# Patient Record
Sex: FEMALE | HISPANIC OR LATINO | Employment: FULL TIME | URBAN - METROPOLITAN AREA
[De-identification: names, ages, dates, MRNs, and addresses within clinical notes are randomized per-mention and may not be internally consistent; named-entity substitution may affect disease eponyms.]

---

## 2017-01-17 ENCOUNTER — OFFICE VISIT (OUTPATIENT)
Dept: URGENT CARE | Facility: CLINIC | Age: 22
End: 2017-01-17
Payer: COMMERCIAL

## 2017-01-17 VITALS
SYSTOLIC BLOOD PRESSURE: 104 MMHG | OXYGEN SATURATION: 98 % | BODY MASS INDEX: 23.96 KG/M2 | HEART RATE: 72 BPM | WEIGHT: 144 LBS | DIASTOLIC BLOOD PRESSURE: 68 MMHG | TEMPERATURE: 97.9 F

## 2017-01-17 DIAGNOSIS — F41.9 ANXIETY: ICD-10-CM

## 2017-01-17 PROCEDURE — 99214 OFFICE O/P EST MOD 30 MIN: CPT | Performed by: NURSE PRACTITIONER

## 2017-01-17 RX ORDER — ZOLPIDEM TARTRATE 5 MG/1
5 TABLET ORAL NIGHTLY PRN
COMMUNITY
End: 2019-03-12

## 2017-01-17 ASSESSMENT — ENCOUNTER SYMPTOMS
NERVOUS/ANXIOUS: 1
VOMITING: 0
MYALGIAS: 0
DECREASED CONCENTRATION: 1
NAUSEA: 0
FEVER: 0
INSOMNIA: 1
DEPRESSED MOOD: 1
STRESS: 1
CONFUSION: 0
HEADACHES: 0
HALLUCINATIONS: 0
CHILLS: 0
SHORTNESS OF BREATH: 0
COUGH: 0
ABDOMINAL PAIN: 0

## 2017-01-17 ASSESSMENT — LIFESTYLE VARIABLES: SUBSTANCE_ABUSE: 0

## 2017-01-17 NOTE — Clinical Note
January 17, 2017         Patient: Hannah Wiggins   YOB: 1995   Date of Visit: 1/17/2017           To Whom it May Concern:    Hannah Wiggins was seen in my clinic on 1/17/2017. She should be off work today due to illness.     If you have any questions or concerns, please don't hesitate to call.        Sincerely,           ROBERT PaceP.PASTOR.  Electronically Signed

## 2017-01-17 NOTE — PATIENT INSTRUCTIONS
Generalized Anxiety Disorder  Generalized anxiety disorder (DREA) is a mental disorder. It interferes with life functions, including relationships, work, and school.  DREA is different from normal anxiety, which everyone experiences at some point in their lives in response to specific life events and activities. Normal anxiety actually helps us prepare for and get through these life events and activities. Normal anxiety goes away after the event or activity is over.   DREA causes anxiety that is not necessarily related to specific events or activities. It also causes excess anxiety in proportion to specific events or activities. The anxiety associated with DREA is also difficult to control. DREA can vary from mild to severe. People with severe DREA can have intense waves of anxiety with physical symptoms (panic attacks).   SYMPTOMS  The anxiety and worry associated with DREA are difficult to control. This anxiety and worry are related to many life events and activities and also occur more days than not for 6 months or longer. People with DREA also have three or more of the following symptoms (one or more in children):  · Restlessness.    · Fatigue.  · Difficulty concentrating.    · Irritability.  · Muscle tension.  · Difficulty sleeping or unsatisfying sleep.  DIAGNOSIS  DREA is diagnosed through an assessment by your health care provider. Your health care provider will ask you questions about your mood, physical symptoms, and events in your life. Your health care provider may ask you about your medical history and use of alcohol or drugs, including prescription medicines. Your health care provider may also do a physical exam and blood tests. Certain medical conditions and the use of certain substances can cause symptoms similar to those associated with DREA. Your health care provider may refer you to a mental health specialist for further evaluation.  TREATMENT  The following therapies are usually used to treat DREA:    · Medication. Antidepressant medication usually is prescribed for long-term daily control. Antianxiety medicines may be added in severe cases, especially when panic attacks occur.    · Talk therapy (psychotherapy). Certain types of talk therapy can be helpful in treating DREA by providing support, education, and guidance. A form of talk therapy called cognitive behavioral therapy can teach you healthy ways to think about and react to daily life events and activities.  · Stress management techniques. These include yoga, meditation, and exercise and can be very helpful when they are practiced regularly.  A mental health specialist can help determine which treatment is best for you. Some people see improvement with one therapy. However, other people require a combination of therapies.     This information is not intended to replace advice given to you by your health care provider. Make sure you discuss any questions you have with your health care provider.     Document Released: 04/14/2014 Document Revised: 01/08/2016 Document Reviewed: 04/14/2014  [employer] Interactive Patient Education ©2016 [employer]

## 2017-01-17 NOTE — MR AVS SNAPSHOT
Hannah Wiggins   2017 10:45 AM   Office Visit   MRN: [id number]    Department:  [facility]   Dept Phone:  [phone]    Description:  Female : 1995   Provider:  ANI Pace           Reason for Visit     Stress Anxiety, Not sleeping quite some time/Had to leave work      Allergies as of 2017     No Known Allergies      You were diagnosed with     Anxiety   [587862]         Vital Signs     Blood Pressure Pulse Temperature Weight Oxygen Saturation Last Menstrual Period    104/68 mmHg 72 36.6 °C (97.9 °F) 65.318 kg (144 lb) 98% 2017    Breastfeeding? Smoking Status                No Never Smoker           Basic Information     Date Of Birth Sex Race Ethnicity Preferred Language    1995 Female  or   Origin (Yakut,Nigerien,Barbadian,Cayman Islander, etc) English      Health Maintenance        Date Due Completion Dates    IMM HEP B VACCINE (1 of 3 - Primary Series) 1995 ---    IMM HEP A VACCINE (1 of 2 - Standard Series) 1996 ---    IMM HPV VACCINE (1 of 3 - Female 3 Dose Series) 2006 ---    IMM VARICELLA (CHICKENPOX) VACCINE (1 of 2 - 2 Dose Adolescent Series) 2008 ---    IMM MENINGOCOCCAL VACCINE (MCV4) (1 of 1) 2011 ---    IMM DTaP/Tdap/Td Vaccine (1 - Tdap) 2014 ---    PAP SMEAR 2016 ---    IMM INFLUENZA (1) 2016 ---            Current Immunizations     No immunizations on file.      Below and/or attached are the medications your provider expects you to take. Review all of your home medications and newly ordered medications with your provider and/or pharmacist. Follow medication instructions as directed by your provider and/or pharmacist. Please keep your medication list with you and share with your provider. Update the information when medications are discontinued, doses are changed, or new medications (including over-the-counter products) are added; and carry medication information at all times in the  event of emergency situations     Allergies:  No Known Allergies          Medications  Valid as of: January 17, 2017 - 11:43 AM    Generic Name Brand Name Tablet Size Instructions for use    Hydrocod Polst-Chlorphen Polst (Suspension Extended Release) TUSSIONEX 10-8 MG/5ML Take 5 mL by mouth every 12 hours as needed for Cough.        Ibuprofen (Tab) MOTRIN 800 MG Take 1 Tab by mouth every 8 hours as needed.        Zolpidem Tartrate (Tab) AMBIEN 5 MG Take 5 mg by mouth at bedtime as needed for Sleep.        .                 Medicines prescribed today were sent to:     [facility]/PHARMACY #[id number] - [place], [place] - [address] AT [address] [place] [address]    Phone: [phone] Fax: [phone]    Open 24 Hours?: No      Medication refill instructions:       If your prescription bottle indicates you have medication refills left, it is not necessary to call your provider’s office. Please contact your pharmacy and they will refill your medication.    If your prescription bottle indicates you do not have any refills left, you may request refills at any time through one of the following ways: The online Ipselex system (except Urgent Care), by calling your provider’s office, or by asking your pharmacy to contact your provider’s office with a refill request. Medication refills are processed only during regular business hours and may not be available until the next business day. Your provider may request additional information or to have a follow-up visit with you prior to refilling your medication.   *Please Note: Medication refills are assigned a new Rx number when refilled electronically. Your pharmacy may indicate that no refills were authorized even though a new prescription for the same medication is available at the pharmacy. Please request the medicine by name with the pharmacy before contacting your provider for a refill.        Referral     A referral request has been sent to our  patient care coordination department. Please allow 3-5 business days for us to process this request and contact you either by phone or mail. If you do not hear from us by the 5th business day, please call us at [phone].        Instructions    Generalized Anxiety Disorder  Generalized anxiety disorder (DREA) is a mental disorder. It interferes with life functions, including relationships, work, and school.  DREA is different from normal anxiety, which everyone experiences at some point in their lives in response to specific life events and activities. Normal anxiety actually helps us prepare for and get through these life events and activities. Normal anxiety goes away after the event or activity is over.   DREA causes anxiety that is not necessarily related to specific events or activities. It also causes excess anxiety in proportion to specific events or activities. The anxiety associated with DREA is also difficult to control. DREA can vary from mild to severe. People with severe DREA can have intense waves of anxiety with physical symptoms (panic attacks).   SYMPTOMS  The anxiety and worry associated with DREA are difficult to control. This anxiety and worry are related to many life events and activities and also occur more days than not for 6 months or longer. People with DREA also have three or more of the following symptoms (one or more in children):  · Restlessness.    · Fatigue.  · Difficulty concentrating.    · Irritability.  · Muscle tension.  · Difficulty sleeping or unsatisfying sleep.  DIAGNOSIS  DREA is diagnosed through an assessment by your health care provider. Your health care provider will ask you questions about your mood, physical symptoms, and events in your life. Your health care provider may ask you about your medical history and use of alcohol or drugs, including prescription medicines. Your health care provider may also do a physical exam and blood tests. Certain medical conditions and the  use of certain substances can cause symptoms similar to those associated with DREA. Your health care provider may refer you to a mental health specialist for further evaluation.  TREATMENT  The following therapies are usually used to treat DREA:   · Medication. Antidepressant medication usually is prescribed for long-term daily control. Antianxiety medicines may be added in severe cases, especially when panic attacks occur.    · Talk therapy (psychotherapy). Certain types of talk therapy can be helpful in treating DREA by providing support, education, and guidance. A form of talk therapy called cognitive behavioral therapy can teach you healthy ways to think about and react to daily life events and activities.  · Stress management techniques. These include yoga, meditation, and exercise and can be very helpful when they are practiced regularly.  A mental health specialist can help determine which treatment is best for you. Some people see improvement with one therapy. However, other people require a combination of therapies.     This information is not intended to replace advice given to you by your health care provider. Make sure you discuss any questions you have with your health care provider.     Document Released: 04/14/2014 Document Revised: 01/08/2016 Document Reviewed: 04/14/2014  [employer] Interactive Patient Education ©2016 Elsevier Inc.            Intraxio Access Code: [email or web address]  Expires: 1/29/2017  5:54 PM    Intraxio  A secure, online tool to manage your health information     [employer] Intraxio® is a secure, online tool that connects you to your personalized health information from the privacy of your home -- day or night - making it very easy for you to manage your healthcare. Once the activation process is completed, you can even access your medical information using the Intraxio asndra, which is available for free in the Apple Sandra store or Google Play store.     Intraxio provides the following  levels of access (as shown below):   My Chart Features   [facility] Primary Care Doctor [facility]  Specialists [facility]  Urgent  Care Non-[facility]  Primary Care  Doctor   Email your healthcare team securely and privately 24/7 X X X    Manage appointments: schedule your next appointment; view details of past/upcoming appointments X      Request prescription refills. X      View recent personal medical records, including lab and immunizations X X X X   View health record, including health history, allergies, medications X X X X   Read reports about your outpatient visits, procedures, consult and ER notes X X X X   See your discharge summary, which is a recap of your hospital and/or ER visit that includes your diagnosis, lab results, and care plan. X X       How to register for [employer]:  1. Go to  [email or web address].  2. Click on the Sign Up Now box, which takes you to the New Member Sign Up page. You will need to provide the following information:  a. Enter your [employer] Access Code exactly as it appears at the top of this page. (You will not need to use this code after you’ve completed the sign-up process. If you do not sign up before the expiration date, you must request a new code.)   b. Enter your date of birth.   c. Enter your home email address.   d. Click Submit, and follow the next screen’s instructions.  3. Create a [employer] ID. This will be your [employer] login ID and cannot be changed, so think of one that is secure and easy to remember.  4. Create a [employer] password. You can change your password at any time.  5. Enter your Password Reset Question and Answer. This can be used at a later time if you forget your password.   6. Enter your e-mail address. This allows you to receive e-mail notifications when new information is available in [employer].  7. Click Sign Up. You can now view your health information.    For assistance activating your [employer] account, call [phone]

## 2017-02-24 ENCOUNTER — OFFICE VISIT (OUTPATIENT)
Dept: URGENT CARE | Facility: CLINIC | Age: 22
End: 2017-02-24
Payer: COMMERCIAL

## 2017-02-24 VITALS
TEMPERATURE: 97.9 F | HEART RATE: 80 BPM | SYSTOLIC BLOOD PRESSURE: 122 MMHG | BODY MASS INDEX: 23.63 KG/M2 | DIASTOLIC BLOOD PRESSURE: 86 MMHG | OXYGEN SATURATION: 98 % | RESPIRATION RATE: 16 BRPM | WEIGHT: 142 LBS

## 2017-02-24 DIAGNOSIS — F41.9 ANXIETY: ICD-10-CM

## 2017-02-24 PROCEDURE — 99214 OFFICE O/P EST MOD 30 MIN: CPT | Performed by: NURSE PRACTITIONER

## 2017-02-24 RX ORDER — ALPRAZOLAM 0.25 MG/1
0.25 TABLET ORAL 2 TIMES DAILY PRN
Qty: 15 TAB | Refills: 0 | Status: SHIPPED | OUTPATIENT
Start: 2017-02-24 | End: 2019-03-12

## 2017-02-24 ASSESSMENT — ENCOUNTER SYMPTOMS
NERVOUS/ANXIOUS: 1
FEVER: 0
DEPRESSION: 1

## 2017-02-24 NOTE — PROGRESS NOTES
Subjective:      Hannah Wiggins is a 22 y.o. female who presents with Anxiety    Past Medical History   Diagnosis Date   • Anxiety      Social History     Social History   • Marital Status: Single     Spouse Name: N/A   • Number of Children: N/A   • Years of Education: N/A     Occupational History   • Not on file.     Social History Main Topics   • Smoking status: Never Smoker    • Smokeless tobacco: Never Used   • Alcohol Use: No   • Drug Use: No   • Sexual Activity: Not on file     Other Topics Concern   • Not on file     Social History Narrative     Family hx: not pertinent to today's visit    This patient presents today for complaint of anxiety. Her last visit in urgent care was on 1/17/17 also for the same complaint. She was referred to behavioral health at that time, upon reviewing the notes, it does appear that her referral was approved. There is a note from the referrals coordinator that she tried to reach the patient and was unable to make contact. She did leave a message however for the patient with the information regarding the referral. Patient does not recall getting the message and has not yet called for the appointment. I reviewed the referral information and gave the patient a written copy of the referral approval and the number to call to schedule her appointmetn. Patient denies suicidal or homicidal ideation    Secondly, patient complains that she has been coughing for the last 2 weeks. She states cough has been dry. She denies any chest congestion or shortness of breath. She denies fever, aches, or chills.          Anxiety  Presents for follow-up visit. Progression since onset: unresolved. Symptoms include nervous/anxious behavior.           Review of Systems   Constitutional: Negative for fever and malaise/fatigue.   Psychiatric/Behavioral: Positive for depression. The patient is nervous/anxious.      All other systems reviewed and are negative      Objective:     Wt 64.411 kg (142 lb)      Physical Exam   Constitutional: She is oriented to person, place, and time. She appears well-developed and well-nourished. No distress.   HENT:   Head: Normocephalic.   Right Ear: External ear normal.   Left Ear: External ear normal.   Nose: Nose normal.   Mouth/Throat: Oropharynx is clear and moist. No oropharyngeal exudate.   Eyes: Conjunctivae and EOM are normal. Pupils are equal, round, and reactive to light. Right eye exhibits no discharge. Left eye exhibits no discharge.   Neck: Normal range of motion. Neck supple.   Cardiovascular: Normal rate and regular rhythm.    Pulmonary/Chest: Effort normal and breath sounds normal.   Musculoskeletal: Normal range of motion.   Neurological: She is alert and oriented to person, place, and time.   Skin: Skin is warm and dry. She is not diaphoretic.   Psychiatric: She has a normal mood and affect. Her speech is normal and behavior is normal. Judgment and thought content normal. Her mood appears not anxious. Her affect is not angry, not blunt, not labile and not inappropriate. She is not agitated, not aggressive and not hyperactive. Thought content is not paranoid and not delusional. Cognition and memory are normal. She does not exhibit a depressed mood. She expresses no homicidal and no suicidal ideation. She expresses no suicidal plans and no homicidal plans.   Vitals reviewed.              Assessment/Plan:   Anxiety  Viral URI vs Allergies  -flonase OTC  -OTC decongestant/antihistamine of choice  -Written information for referral given  -xanax PRN for anxiety; clearly stated no driving or ETOH with this medication   -ER precautions for worsening of anxiety or depression  There are no diagnoses linked to this encounter.

## 2017-02-24 NOTE — MR AVS SNAPSHOT
Hannah Wiggins   2017 1:30 PM   Office Visit   MRN: [id number]    Department:  [facility]   Dept Phone:  [phone]    Description:  Female : 1995   Provider:  Cathey J Hamman, A.P.N.           Reason for Visit     Anxiety           Allergies as of 2017     No Known Allergies      You were diagnosed with     Anxiety   [[id number]]         Vital Signs     Blood Pressure Pulse Temperature Respirations Weight Oxygen Saturation    122/86 mmHg 80 36.6 °C (97.9 °F) 16 64.411 kg (142 lb) 98%    Smoking Status                   Never Smoker            Basic Information     Date Of Birth Sex Race Ethnicity Preferred Language    1995 Female  or   Origin (Korean,Sri Lankan,Cook Islander,Paulino, etc) English      Health Maintenance        Date Due Completion Dates    IMM HEP B VACCINE (1 of 3 - Primary Series) 1995 ---    IMM HEP A VACCINE (1 of 2 - Standard Series) 1996 ---    IMM HPV VACCINE (1 of 3 - Female 3 Dose Series) 2006 ---    IMM VARICELLA (CHICKENPOX) VACCINE (1 of 2 - 2 Dose Adolescent Series) 2008 ---    IMM DTaP/Tdap/Td Vaccine (1 - Tdap) 2014 ---    PAP SMEAR 2016 ---    IMM INFLUENZA (1) 2016 ---            Current Immunizations     No immunizations on file.      Below and/or attached are the medications your provider expects you to take. Review all of your home medications and newly ordered medications with your provider and/or pharmacist. Follow medication instructions as directed by your provider and/or pharmacist. Please keep your medication list with you and share with your provider. Update the information when medications are discontinued, doses are changed, or new medications (including over-the-counter products) are added; and carry medication information at all times in the event of emergency situations     Allergies:  No Known Allergies          Medications  Valid as of: 2017 -  2:06 PM    Generic  Name Brand Name Tablet Size Instructions for use    ALPRAZolam (Tab) XANAX 0.25 MG Take 1 Tab by mouth 2 times a day as needed for Sleep or Anxiety.        Hydrocod Polst-Chlorphen Polst (Suspension Extended Release) TUSSIONEX 10-8 MG/5ML Take 5 mL by mouth every 12 hours as needed for Cough.        Ibuprofen (Tab) MOTRIN 800 MG Take 1 Tab by mouth every 8 hours as needed.        Zolpidem Tartrate (Tab) AMBIEN 5 MG Take 5 mg by mouth at bedtime as needed for Sleep.        .                 Medicines prescribed today were sent to:     [facility]/PHARMACY #[id number] - [place], [place] - [address] AT [address] [place] [address]    Phone: [phone] Fax: [phone]    Open 24 Hours?: No      Medication refill instructions:       If your prescription bottle indicates you have medication refills left, it is not necessary to call your provider’s office. Please contact your pharmacy and they will refill your medication.    If your prescription bottle indicates you do not have any refills left, you may request refills at any time through one of the following ways: The online NanoVasc system (except Urgent Care), by calling your provider’s office, or by asking your pharmacy to contact your provider’s office with a refill request. Medication refills are processed only during regular business hours and may not be available until the next business day. Your provider may request additional information or to have a follow-up visit with you prior to refilling your medication.   *Please Note: Medication refills are assigned a new Rx number when refilled electronically. Your pharmacy may indicate that no refills were authorized even though a new prescription for the same medication is available at the pharmacy. Please request the medicine by name with the pharmacy before contacting your provider for a refill.           NanoVasc Access Code: [email or web address]  Expires: 3/26/2017  2:06 PM    Roselyn FIGUEROA  secure, online tool to manage your health information     [employer]’s [employer]IS [employer]® is a secure, online tool that connects you to your personalized health information from the privacy of your home -- day or night - making it very easy for you to manage your healthcare. Once the activation process is completed, you can even access your medical information using the TeleCIS Wireless sandra, which is available for free in the Apple Sandra store or Google Play store.     [employer]CIS Wireless provides the following levels of access (as shown below):   My Chart Features   [facility] Doctor [facility]  Specialists [facility]  [facility]gent  [facility]  Primary Care  Doctor   Email your healthcare team securely and privately 24/7 X X X    Manage appointments: schedule your next appointment; view details of past/upcoming appointments X      Request prescription refills. X      View recent personal medical records, including lab and immunizations X X X X   View health record, including health history, allergies, medications X X X X   Read reports about your outpatient visits, procedures, consult and ER notes X X X X   See your discharge summary, which is a recap of your hospital and/or ER visit that includes your diagnosis, lab results, and care plan. X X       How to register for [employer]IS [employer]:  1. Go to  [email or web address].  2. Click on the Sign Up Now box, which takes you to the New Member Sign Up page. You will need to provide the following information:  a. Enter your [employer] Access Code exactly as it appears at the top of this page. (You will not need to use this code after you’ve completed the sign-up process. If you do not sign up before the expiration date, you must request a new code.)   b. Enter your date of birth.   c. Enter your home email address.   d. Click Submit, and follow the next screen’s instructions.  3. Create a Tele[employer]IS [employer] ID. This will be your [employer] login ID and cannot be changed, so think of one that is secure and easy to  remember.  4. Create a Terascore password. You can change your password at any time.  5. Enter your Password Reset Question and Answer. This can be used at a later time if you forget your password.   6. Enter your e-mail address. This allows you to receive e-mail notifications when new information is available in Ter[employer]ore.  7. Click Sign Up. You can now view your health information.    For assistance activating your Terascore account, call [phone]

## 2017-05-03 ENCOUNTER — OFFICE VISIT (OUTPATIENT)
Dept: URGENT CARE | Facility: PHYSICIAN GROUP | Age: 22
End: 2017-05-03
Payer: COMMERCIAL

## 2017-05-03 ENCOUNTER — HOSPITAL ENCOUNTER (OUTPATIENT)
Facility: MEDICAL CENTER | Age: 22
End: 2017-05-03
Attending: PHYSICIAN ASSISTANT
Payer: COMMERCIAL

## 2017-05-03 VITALS
SYSTOLIC BLOOD PRESSURE: 122 MMHG | HEIGHT: 65 IN | HEART RATE: 97 BPM | OXYGEN SATURATION: 99 % | BODY MASS INDEX: 23.66 KG/M2 | WEIGHT: 142 LBS | RESPIRATION RATE: 16 BRPM | TEMPERATURE: 98.7 F | DIASTOLIC BLOOD PRESSURE: 72 MMHG

## 2017-05-03 DIAGNOSIS — R09.81 NASAL SINUS CONGESTION: ICD-10-CM

## 2017-05-03 DIAGNOSIS — Z3A.01 LESS THAN 8 WEEKS GESTATION OF PREGNANCY: ICD-10-CM

## 2017-05-03 LAB
APPEARANCE UR: CLEAR
B-HCG SERPL-ACNC: 575.5 MIU/ML (ref 0–5)
BILIRUB UR STRIP-MCNC: NEGATIVE MG/DL
COLOR UR AUTO: YELLOW
GLUCOSE UR STRIP.AUTO-MCNC: NEGATIVE MG/DL
INT CON NEG: NEGATIVE
INT CON POS: POSITIVE
KETONES UR STRIP.AUTO-MCNC: ABNORMAL MG/DL
LEUKOCYTE ESTERASE UR QL STRIP.AUTO: NEGATIVE
NITRITE UR QL STRIP.AUTO: NEGATIVE
PH UR STRIP.AUTO: 6 [PH] (ref 5–8)
POC URINE PREGNANCY TEST: POSITIVE
PROT UR QL STRIP: ABNORMAL MG/DL
RBC UR QL AUTO: NEGATIVE
SP GR UR STRIP.AUTO: 1.02
UROBILINOGEN UR STRIP-MCNC: NEGATIVE MG/DL

## 2017-05-03 PROCEDURE — 81025 URINE PREGNANCY TEST: CPT | Performed by: PHYSICIAN ASSISTANT

## 2017-05-03 PROCEDURE — 99214 OFFICE O/P EST MOD 30 MIN: CPT | Performed by: PHYSICIAN ASSISTANT

## 2017-05-03 PROCEDURE — 81002 URINALYSIS NONAUTO W/O SCOPE: CPT | Performed by: PHYSICIAN ASSISTANT

## 2017-05-03 PROCEDURE — 84702 CHORIONIC GONADOTROPIN TEST: CPT

## 2017-05-03 ASSESSMENT — ENCOUNTER SYMPTOMS
VOMITING: 0
PALPITATIONS: 0
FEVER: 0
ABDOMINAL PAIN: 0
COUGH: 1
HEADACHES: 0
CHILLS: 0
DIZZINESS: 1
MYALGIAS: 0
NAUSEA: 1

## 2017-05-03 NOTE — PATIENT INSTRUCTIONS
Medicines During Pregnancy  During pregnancy, there are medicines that are either safe or unsafe to take. Medicines include prescriptions from your caregiver, over-the-counter medicines, topical creams applied to the skin, and all herbal substances. Medicines are put into either Class A, B, C, or D. Class A and B medicines have been shown to be safe in pregnancy. Class C medicines are also considered to be safe in pregnancy, but these medicines should only be used when necessary. Class D medicines should not be used at all in pregnancy. They can be harmful to a baby.   It is best to take as little medicine as possible while pregnant. However, some medicines are necessary to take for the mother and baby's health. Sometimes, it is more dangerous to stop taking certain medicines than to stay on them. This is often the case for people with long-term (chronic) conditions such as asthma, diabetes, or high blood pressure (hypertension). If you are pregnant and have a chronic illness, call your caregiver right away. Bring a list of your medicines and their doses to your appointments. If you are planning to become pregnant, schedule a doctor's appointment and discuss your medicines with your caregiver.  Lastly, write down the phone number to your pharmacist. They can answer questions regarding a medicine's class and safety. They cannot give advice as to whether you should or should not be on a medicine.   SAFE AND UNSAFE MEDICINES  There is a long list of medicines that are considered safe in pregnancy. Below is a shorter list. For specific medicines, ask your caregiver.   Allergy Medicines  Loratadine, cetirizine, and chlorpheniramine are safe to take. Certain nasal steroid sprays are safe. Talk to your caregiver about specific brands that are safe.  (Claritin or Zytec)  Analgesics  Acetaminophen and acetaminophen with codeine are safe to take. All other nonsteroidal anti-inflammatory drugs (NSAIDS) are not safe. This  includes ibuprofen.   Antacids   Many over-the-counter antacids are safe to take. Talk to your caregiver about specific brands that are safe. Famotidine, ranitidine, and lansoprazole are safe. Omepresole is considered safe to take in the second trimester.  Antibiotic Medicines   There are several antibiotics to avoid. These include, but are not limited to, tetracyline, quinolones, and sulfa medications. Talk to your caregiver before taking any antibiotic.   Antihistamines   Talk to your caregiver about specific brands that are safe.   Asthma Medicines   Most asthma steroid inhalers are safe to take. Talk to your caregiver for specific details.  Calcium   Calcium supplements are safe to take. Do not take oyster shell calcium.   Cough and Cold Medicines   It is safe to take products with guaifenesin or dextromethorphan. Talk to your caregiver about specific brands that are safe. It is not safe to take products that contain aspirin or ibuprofen.  Decongestant Medicines  Pseudoephedrine-containing products are safe to take in the second and third trimester.   Depression Medicines   Talk about these medicines with your caregiver.   Antidiarrheal Medicines   It is safe to take loperamide. Talk to your caregiver about specific brands that are safe. It is not safe to take any antidiarrheal medicine that contains bismuth.  Eyedrops   Allergy eyedrops should be limited.   Iron   It is safe to use certain iron-containing medicines for anemia in pregnancy. They require a prescription.   Antinausea Medicines   It is safe to take doxylamine and vitamin B6 as directed. There are other prescription medicines available, if needed.   Sleep aids   It is safe to take diphenhydramine and acetaminophen with diphenhydramine.   Steroids   Hydrocortisone creams are safe to use as directed. Oral steroids require a prescription. It is not safe to take any hemorrhoid cream with pramoxine or phenylephrine.  Stool softener   It is safe to take  stool softener medicines. Avoid daily or prolonged use of stool softeners.  Thyroid Medicine   It is important to stay on this thyroid medicine. It needs to be followed by your caregiver.   Vaginal Medicines   Your caregiver will prescribe a medicine to you if you have a vaginal infection. Certain antifungal medicines are safe to use if you have a sexually transmitted infection (STI). Talk to your caregiver.   Document Released: 12/18/2006 Document Revised: 03/11/2013 Document Reviewed: 12/18/2012  [employer]® Patient Information ©2014 [employer].

## 2017-05-03 NOTE — MR AVS SNAPSHOT
"        Hannah RuvalcabaKeenan   5/3/2017 3:00 PM   Office Visit   MRN: [id number]    Department:  [facility]   Dept Phone:  [phone]    Description:  Female : 1995   Provider:  Dany Barrios PA-C           Reason for Visit     Cough C/o tight throat (difficulty breathing), productive cough, chest/nasal congestion, fatigue, nausea, dizzyness x1 week.  PT states she believes her allergies may be the root or masking her symptoms.        Allergies as of 5/3/2017     No Known Allergies      You were diagnosed with     Nasal sinus congestion   [475356]       Less than 8 weeks gestation of pregnancy   [[id number]]         Vital Signs     Blood Pressure Pulse Temperature Respirations Height Weight    122/72 mmHg 97 37.1 °C (98.7 °F) 16 1.651 m (5' 5\") 64.411 kg (142 lb)    Body Mass Index Oxygen Saturation Smoking Status             23.63 kg/m2 99% Never Smoker          Basic Information     Date Of Birth Sex Race Ethnicity Preferred Language    1995 Female  or   Origin (Thai,Chadian,Martiniquais,Burundian, etc) English      Health Maintenance        Date Due Completion Dates    IMM HEP B VACCINE (1 of 3 - Primary Series) 1995 ---    IMM HEP A VACCINE (1 of 2 - Standard Series) 1996 ---    IMM HPV VACCINE (1 of 3 - Female 3 Dose Series) 2006 ---    IMM VARICELLA (CHICKENPOX) VACCINE (1 of 2 - 2 Dose Adolescent Series) 2008 ---    IMM DTaP/Tdap/Td Vaccine (1 - Tdap) 2014 ---    PAP SMEAR 2016 ---            Current Immunizations     No immunizations on file.      Below and/or attached are the medications your provider expects you to take. Review all of your home medications and newly ordered medications with your provider and/or pharmacist. Follow medication instructions as directed by your provider and/or pharmacist. Please keep your medication list with you and share with your provider. Update the information when medications are discontinued, doses are " changed, or new medications (including over-the-counter products) are added; and carry medication information at all times in the event of emergency situations     Allergies:  No Known Allergies          Medications  Valid as of: May 03, 2017 -  4:14 PM    Generic Name Brand Name Tablet Size Instructions for use    ALPRAZolam (Tab) XANAX 0.25 MG Take 1 Tab by mouth 2 times a day as needed for Sleep or Anxiety.        DiphenhydrAMINE HCl   Take  by mouth.        Hydrocod Polst-Chlorphen Polst (Suspension Extended Release) TUSSIONEX 10-8 MG/5ML Take 5 mL by mouth every 12 hours as needed for Cough.        Ibuprofen (Tab) MOTRIN 800 MG Take 1 Tab by mouth every 8 hours as needed.        Zolpidem Tartrate (Tab) AMBIEN 5 MG Take 5 mg by mouth at bedtime as needed for Sleep.        .                 Medicines prescribed today were sent to:     [id number] - [place], [place] - [address] AT [address] [address]    Phone: [phone] Fax: [phone]    Open 24 Hours?: No      Medication refill instructions:       If your prescription bottle indicates you have medication refills left, it is not necessary to call your provider’s office. Please contact your pharmacy and they will refill your medication.    If your prescription bottle indicates you do not have any refills left, you may request refills at any time through one of the following ways: The online gantto system (except Urgent Care), by calling your provider’s office, or by asking your pharmacy to contact your provider’s office with a refill request. Medication refills are processed only during regular business hours and may not be available until the next business day. Your provider may request additional information or to have a follow-up visit with you prior to refilling your medication.   *Please Note: Medication refills are assigned a new Rx number when refilled electronically. Your pharmacy may indicate  that no refills were authorized even though a new prescription for the same medication is available at the pharmacy. Please request the medicine by name with the pharmacy before contacting your provider for a refill.        Your To Do List     Future Labs/Procedures Complete By Expires    HCG QUANTITATIVE  As directed 5/4/2018      Instructions    Medicines During Pregnancy  During pregnancy, there are medicines that are either safe or unsafe to take. Medicines include prescriptions from your caregiver, over-the-counter medicines, topical creams applied to the skin, and all herbal substances. Medicines are put into either Class A, B, C, or D. Class A and B medicines have been shown to be safe in pregnancy. Class C medicines are also considered to be safe in pregnancy, but these medicines should only be used when necessary. Class D medicines should not be used at all in pregnancy. They can be harmful to a baby.   It is best to take as little medicine as possible while pregnant. However, some medicines are necessary to take for the mother and baby's health. Sometimes, it is more dangerous to stop taking certain medicines than to stay on them. This is often the case for people with long-term (chronic) conditions such as asthma, diabetes, or high blood pressure (hypertension). If you are pregnant and have a chronic illness, call your caregiver right away. Bring a list of your medicines and their doses to your appointments. If you are planning to become pregnant, schedule a doctor's appointment and discuss your medicines with your caregiver.  Lastly, write down the phone number to your pharmacist. They can answer questions regarding a medicine's class and safety. They cannot give advice as to whether you should or should not be on a medicine.   SAFE AND UNSAFE MEDICINES  There is a long list of medicines that are considered safe in pregnancy. Below is a shorter list. For specific medicines, ask your caregiver.      Allergy Medicines  Loratadine, cetirizine, and chlorpheniramine are safe to take. Certain nasal steroid sprays are safe. Talk to your caregiver about specific brands that are safe.  (Claritin or Zytec)  Analgesics  Acetaminophen and acetaminophen with codeine are safe to take. All other nonsteroidal anti-inflammatory drugs (NSAIDS) are not safe. This includes ibuprofen.   Antacids   Many over-the-counter antacids are safe to take. Talk to your caregiver about specific brands that are safe. Famotidine, ranitidine, and lansoprazole are safe. Omepresole is considered safe to take in the second trimester.  Antibiotic Medicines   There are several antibiotics to avoid. These include, but are not limited to, tetracyline, quinolones, and sulfa medications. Talk to your caregiver before taking any antibiotic.   Antihistamines   Talk to your caregiver about specific brands that are safe.   Asthma Medicines   Most asthma steroid inhalers are safe to take. Talk to your caregiver for specific details.  Calcium   Calcium supplements are safe to take. Do not take oyster shell calcium.   Cough and Cold Medicines   It is safe to take products with guaifenesin or dextromethorphan. Talk to your caregiver about specific brands that are safe. It is not safe to take products that contain aspirin or ibuprofen.  Decongestant Medicines  Pseudoephedrine-containing products are safe to take in the second and third trimester.   Depression Medicines   Talk about these medicines with your caregiver.   Antidiarrheal Medicines   It is safe to take loperamide. Talk to your caregiver about specific brands that are safe. It is not safe to take any antidiarrheal medicine that contains bismuth.  Eyedrops   Allergy eyedrops should be limited.   Iron   It is safe to use certain iron-containing medicines for anemia in pregnancy. They require a prescription.   Antinausea Medicines   It is safe to take doxylamine and vitamin B6 as directed. There are  other prescription medicines available, if needed.   Sleep aids   It is safe to take diphenhydramine and acetaminophen with diphenhydramine.   Steroids   Hydrocortisone creams are safe to use as directed. Oral steroids require a prescription. It is not safe to take any hemorrhoid cream with pramoxine or phenylephrine.  Stool softener   It is safe to take stool softener medicines. Avoid daily or prolonged use of stool softeners.  Thyroid Medicine   It is important to stay on this thyroid medicine. It needs to be followed by your caregiver.   Vaginal Medicines   Your caregiver will prescribe a medicine to you if you have a vaginal infection. Certain antifungal medicines are safe to use if you have a sexually transmitted infection (STI). Talk to your caregiver.   Document Released: 12/18/2006 Document Revised: 03/11/2013 Document Reviewed: 12/18/2012  [employer]® Patient Information ©2014 [employer].            [facility] Access Code: [email or web address]  Expires: 5/8/2017 11:13 AM    [facility]  A secure, online tool to manage your health information     [employer]’s [facility]® is a secure, online tool that connects you to your personalized health information from the privacy of your home -- day or night - making it very easy for you to manage your healthcare. Once the activation process is completed, you can even access your medical information using the [facility] sandra, which is available for free in the [employer] Sandra store or Google Play store.     [facility] provides the following levels of access (as shown below):   My Chart Features   [facility] Doctor [facility]  Specialists [facility]  Urgent  Care Non-[facility]  Primary Care  Doctor   Email your healthcare team securely and privately 24/7 X X X    Manage appointments: schedule your next appointment; view details of past/upcoming appointments X      Request prescription refills. X      View recent personal medical records, including lab and immunizations X X X X   View health  record, including health history, allergies, medications X X X X   Read reports about your outpatient visits, procedures, consult and ER notes X X X X   See your discharge summary, which is a recap of your hospital and/or ER visit that includes your diagnosis, lab results, and care plan. X X       How to register for [employer]:  1. Go to  [email or web address].  2. Click on the Sign Up Now box, which takes you to the New Member Sign Up page. You will need to provide the following information:  a. Enter your [employer] Access Code exactly as it appears at the top of this page. (You will not need to use this code after you’ve completed the sign-up process. If you do not sign up before the expiration date, you must request a new code.)   b. Enter your date of birth.   c. Enter your home email address.   d. Click Submit, and follow the next screen’s instructions.  3. Create a [employer] ID. This will be your [employer] login ID and cannot be changed, so think of one that is secure and easy to remember.  4. Create a [employer] password. You can change your password at any time.  5. Enter your Password Reset Question and Answer. This can be used at a later time if you forget your password.   6. Enter your e-mail address. This allows you to receive e-mail notifications when new information is available in [employer].  7. Click Sign Up. You can now view your health information.    For assistance activating your [employer] account, call [phone]

## 2017-05-03 NOTE — PROGRESS NOTES
"Subjective:      Hannah Wiggins is a 22 y.o. female who presents with Cough    Current medications reviewed.  Past Medical History   Diagnosis Date   • Anxiety      Social History   Substance Use Topics   • Smoking status: Never Smoker    • Smokeless tobacco: Never Used   • Alcohol Use: No     Family History Reviewed: noncontributory      One week with nausea, fatigue and dizziness.  Additionally she has had productive cough, sinus and chest congestion.  She is concerned she may have allergies.      Cough  Pertinent negatives include no chest pain, chills, fever, headaches or myalgias.       Review of Systems   Constitutional: Positive for malaise/fatigue. Negative for fever and chills.   HENT: Positive for congestion.    Respiratory: Positive for cough.    Cardiovascular: Negative for chest pain and palpitations.   Gastrointestinal: Positive for nausea. Negative for vomiting and abdominal pain.   Musculoskeletal: Negative for myalgias and joint pain.   Neurological: Positive for dizziness. Negative for headaches.   All other systems reviewed and are negative.         Objective:     /72 mmHg  Pulse 97  Temp(Src) 37.1 °C (98.7 °F)  Resp 16  Ht 1.651 m (5' 5\")  Wt 64.411 kg (142 lb)  BMI 23.63 kg/m2  SpO2 99%     Physical Exam   Constitutional: She is oriented to person, place, and time. She appears well-developed and well-nourished.   HENT:   Right Ear: Tympanic membrane and ear canal normal.   Left Ear: Tympanic membrane and ear canal normal.   Nose: Nose normal.   Mouth/Throat: Uvula is midline, oropharynx is clear and moist and mucous membranes are normal.   Cardiovascular: Normal rate and regular rhythm.    Pulmonary/Chest: Effort normal and breath sounds normal.   Neurological: She is alert and oriented to person, place, and time.   Skin: Skin is warm and dry.   Nursing note and vitals reviewed.              Assessment/Plan:     1. Nasal sinus congestion  HCG QUANTITATIVE   2. Less than 8 " weeks gestation of pregnancy  HCG QUANTITATIVE    POCT Urinalysis    POCT Pregnancy     UA: small protein, no culture, Serum quantitative HCG  Saira: [phone] VM OK with blood testing  Note given today for work, return tomorrow  AVS with pregnancy medication.  Patient reports understanding and agrees with plan.    5/5/17: Phone call to patient with report of serum quantitative hCG at 575, informed her she is definitely pregnant and she is approximately 2-3 weeks.

## 2017-05-03 NOTE — Clinical Note
May 3, 2017         Patient: Hannah Wiggins   YOB: 1995   Date of Visit: 5/3/2017           To Whom it May Concern:    Hannah Wiggins was seen in my clinic on 5/3/2017. She may return to work tomorrow.    If you have any questions or concerns, please don't hesitate to call.        Sincerely,     Dany Barrios PA-C  Electronically Signed

## 2017-05-11 ENCOUNTER — OFFICE VISIT (OUTPATIENT)
Dept: URGENT CARE | Facility: CLINIC | Age: 22
End: 2017-05-11
Payer: COMMERCIAL

## 2017-05-11 ENCOUNTER — HOSPITAL ENCOUNTER (OUTPATIENT)
Dept: RADIOLOGY | Facility: MEDICAL CENTER | Age: 22
End: 2017-05-11
Attending: PHYSICIAN ASSISTANT
Payer: COMMERCIAL

## 2017-05-11 VITALS
DIASTOLIC BLOOD PRESSURE: 80 MMHG | BODY MASS INDEX: 23.46 KG/M2 | WEIGHT: 141 LBS | SYSTOLIC BLOOD PRESSURE: 110 MMHG | HEART RATE: 86 BPM | TEMPERATURE: 98.2 F | OXYGEN SATURATION: 97 % | RESPIRATION RATE: 16 BRPM

## 2017-05-11 DIAGNOSIS — R10.9 ABDOMINAL CRAMPING: ICD-10-CM

## 2017-05-11 DIAGNOSIS — Z3A.01 LESS THAN 8 WEEKS GESTATION OF PREGNANCY: ICD-10-CM

## 2017-05-11 LAB
APPEARANCE UR: CLEAR
BILIRUB UR STRIP-MCNC: NEGATIVE MG/DL
COLOR UR AUTO: YELLOW
GLUCOSE UR STRIP.AUTO-MCNC: NEGATIVE MG/DL
KETONES UR STRIP.AUTO-MCNC: NEGATIVE MG/DL
LEUKOCYTE ESTERASE UR QL STRIP.AUTO: NEGATIVE
NITRITE UR QL STRIP.AUTO: NEGATIVE
PH UR STRIP.AUTO: 7.5 [PH] (ref 5–8)
PROT UR QL STRIP: NEGATIVE MG/DL
RBC UR QL AUTO: NEGATIVE
SP GR UR STRIP.AUTO: 1
UROBILINOGEN UR STRIP-MCNC: NEGATIVE MG/DL

## 2017-05-11 PROCEDURE — 81002 URINALYSIS NONAUTO W/O SCOPE: CPT | Performed by: PHYSICIAN ASSISTANT

## 2017-05-11 PROCEDURE — 99213 OFFICE O/P EST LOW 20 MIN: CPT | Performed by: PHYSICIAN ASSISTANT

## 2017-05-11 PROCEDURE — 76817 TRANSVAGINAL US OBSTETRIC: CPT

## 2017-05-11 ASSESSMENT — ENCOUNTER SYMPTOMS
VOMITING: 0
NAUSEA: 1
SHORTNESS OF BREATH: 0
CONSTIPATION: 0
DIARRHEA: 0
BLOOD IN STOOL: 0
ABDOMINAL PAIN: 1
CHILLS: 0
FEVER: 0
FLANK PAIN: 0
PALPITATIONS: 0

## 2017-05-11 NOTE — PROGRESS NOTES
Subjective:      Hannah Wiggins is a 22 y.o. female who presents with Abdominal Pain            Abdominal Pain  Associated symptoms include frequency and nausea. Pertinent negatives include no constipation, diarrhea, dysuria, fever, hematuria, melena or vomiting.   notes was seen on the 3rd, dx'ed w/ pregnancy, notes intermittent abd cramping since then, denies fever/chills, denies emesis, c/o mild nausea, denies dyusuria/urg, increased freq mildly, denies vag discharge, LMP was April 2nd was early for her then, normal flow. Denies PMH of preg. Has contacted OB and has appt pending on June 5th. Has started prenatal's. Notes sharp pain and mild cramping to right lower abd suprapubic area. Denies worsening or improving rather staying the same. PMH of ovarian cyst on right side, notes similar suprapubic pain.      Review of Systems   Constitutional: Negative for fever and chills.   Respiratory: Negative for shortness of breath.    Cardiovascular: Negative for chest pain, palpitations and leg swelling.   Gastrointestinal: Positive for nausea and abdominal pain. Negative for vomiting, diarrhea, constipation, blood in stool and melena.   Genitourinary: Positive for frequency. Negative for dysuria, urgency, hematuria and flank pain.   Skin: Negative for rash.     PMH:  has a past medical history of Anxiety.  MEDS:   Current outpatient prescriptions:   •  DiphenhydrAMINE HCl (BENADRYL PO), Take  by mouth., Disp: , Rfl:   •  alprazolam (XANAX) 0.25 MG Tab, Take 1 Tab by mouth 2 times a day as needed for Sleep or Anxiety., Disp: 15 Tab, Rfl: 0  •  zolpidem (AMBIEN) 5 MG Tab, Take 5 mg by mouth at bedtime as needed for Sleep., Disp: , Rfl:   •  ibuprofen (MOTRIN) 800 MG Tab, Take 1 Tab by mouth every 8 hours as needed., Disp: 20 Tab, Rfl: 3  •  Hydrocod Polst-CPM Polst ER (TUSSIONEX) 10-8 MG/5ML Suspension Extended Release, Take 5 mL by mouth every 12 hours as needed for Cough., Disp: 120 mL, Rfl: 0  ALLERGIES: No  Known Allergies  SURGHX: No past surgical history on file.  SOCHX:  reports that she has never smoked. She has never used smokeless tobacco. She reports that she does not drink alcohol or use illicit drugs.  FH: Family history was reviewed, no pertinent findings to report    I have worn a mask for the entire encounter with this patient.        Objective:     /80 mmHg  Pulse 86  Temp(Src) 36.8 °C (98.2 °F)  Resp 16  Wt 63.957 kg (141 lb)  SpO2 97%     Physical Exam   Constitutional: She is oriented to person, place, and time. She appears well-developed and well-nourished. No distress.   HENT:   Head: Normocephalic and atraumatic.   Right Ear: External ear normal.   Left Ear: External ear normal.   Nose: Nose normal.   Eyes: Conjunctivae and lids are normal. Right eye exhibits no discharge. Left eye exhibits no discharge. No scleral icterus.   Neck: Neck supple.   Cardiovascular: Normal rate and regular rhythm.   No extrasystoles are present.   Pulmonary/Chest: Effort normal and breath sounds normal. No respiratory distress.   Abdominal: Soft. Normal appearance and bowel sounds are normal. There is tenderness ( right supraupic) in the suprapubic area. There is no rigidity, no rebound, no guarding, no CVA tenderness, no tenderness at McBurney's point and negative Mendez's sign.   Musculoskeletal: Normal range of motion.   Neurological: She is alert and oriented to person, place, and time. She is not disoriented.   Skin: Skin is warm and dry. She is not diaphoretic. No erythema. No pallor.   Psychiatric: Her speech is normal and behavior is normal.   Nursing note and vitals reviewed.        POCT UA -   POC Color yellow Negative Final      POC Appearance clear Negative Final     POC Leukocyte Esterase negative Negative Final     POC Nitrites negative Negative Final     POC Urobiligen negative Negative (0.2) mg/dL Final     POC Protein negative Negative mg/dL Final     POC Urine PH 7.5 5.0 - 8.0 Final     POC  Blood negative Negative Final     POC Specific Gravity 1.005 <1.005 - >1.030 Final     POC Ketones negative Negative mg/dL Final     POC Biliruben negative Negative mg/dL Final     POC Glucose negative Negative mg/dL Final     HCG leslye - on 5/3    Bhcg 575.5 (H) 0.0 - 5.0 mIU/mL Final     Comment:      Gestational Age  Expected hCG   0.2-1 week       5-50   1-2  weeks          2-3 weeks        100-5,000   3-4 weeks        500-10,000   4-5 weeks        1,000-50,000   5-6 weeks        10,000-100,000   6-8 weeks        15,000-200,000   2-3 months       10,000-100,000   Normal value for Males and non-pregnant Females: <5.0 mIU/mL.   The safety and effectiveness of this assay for quantitative   measurement of Human Chorionic Gonadotropin (HCG) has been   established for assessment of pregnancy status only.                 US pelvis OB today-    5/11/2017 3:52 PM    HISTORY/REASON FOR EXAM:  RLQ pain      TECHNIQUE/EXAM DESCRIPTION: Real-time OB transvaginal pelvis ultrasound was performed with grey-scale, color and duplex Doppler imaging.    COMPARISON:  None.    FINDINGS:  Uterus measures 7.9 cm in length, 3.9 cm AP and 5.6 cm transverse.  Gestational sac is present within the fundal endometrial canal containing a yolk sac and fetal pole.  Fetal cardiac activity is not demonstrated.  Crown-rump length corresponds to 5 weeks 6 days gestational age.  RIGHT ovary measures 2.8 x 1.3 x 2.0 cm and shows adjacent cystic structure measuring 2.2 x 1.6 x 2.2 cm.  LEFT ovary measures 2.8 x 2.3 x 2.5 cm.  Doppler flow present within both ovaries.  Trace free fluid in the cul-de-sac.         Impression        1.  Single intrauterine gestation of approximately 5 weeks 6 days with estimated date of delivery of 1/5/2018.  No fetal cardiac activity demonstrated, likely due to early age.  Clinical follow-up recommended.  2.  Probable RIGHT paraovarian cyst.  3.  No evidence for ovarian torsion.  4.  Trace free fluid, nonspecific.          Reading Provider Reading Date     Pipe Long M.D. May 11, 2017         Signing Provider Signing Date Signing Time     Pipe Long M.D. May 11, 2017  4:31 PM             Assessment/Plan:   1. Less than 8 weeks gestation of pregnancy  Supportive care is reviewed with patient/caregiver - recommend to push PO fluids and electrolytes, recommend continued f/u w/ OB, normal appearance of gestation/preg now, continue w/ prenatal vitamins, f/u w/ OB  Return to clinic with lack of resolution or progression of symptoms.  ER precautions with any worsening symptoms are reviewed with patient/caregiver and they do express understanding      2. Abdominal cramping    - POCT Urinalysis  - US-OB PELVIS TRANSVAGINAL; Future

## 2017-05-11 NOTE — MR AVS SNAPSHOT
Hannah Wiggins   2017 1:15 PM   Office Visit   MRN: [id number]    Department:  [facility]   Dept Phone:  [phone]    Description:  Female : 1995   Provider:  Kd Benton PA-C           Reason for Visit     Abdominal Pain x 3-4 days off / on, sharp Rt. lower abdominal pain and cramping.  Also lower back pain and urinary frequency.       Allergies as of 2017     No Known Allergies      You were diagnosed with     Abdominal cramping   [[id number]]         Vital Signs     Blood Pressure Pulse Temperature Respirations Weight Oxygen Saturation    110/80 mmHg 86 36.8 °C (98.2 °F) 16 63.957 kg (141 lb) 97%    Smoking Status                   Never Smoker            Basic Information     Date Of Birth Sex Race Ethnicity Preferred Language    1995 Female  or   Origin (Korean,Uzbek,New Zealander,Polish, etc) English      Your appointments     May 11, 2017  4:00 PM   US PREGNANCY (30) with [facility] US 1   [facility]    [address]  [facility] [address]   [phone]           Your ultrasound exam requires you to make special preparations:  1) Please arrive 30 minutes early. 2) If you are late, you will be rescheduled for another day. 3) Check in with the  in your physician's office. 4) If you need to reschedule your appointment, please call at least 48 hours prior to your appointment.  Remember, if you reschedule or you are late, the next available appointment will be in approximately two weeks. 5) Make arrangements for babysitting.  Absolutely, do not bring unattended children. 6) Only one family member may be present for viewing. 7) The appointment will take approximately 30 minutes. 8) Diagnostic Ultrasounds are not to determine the sex of the baby.              Health Maintenance        Date Due Completion Dates    IMM HEP B VACCINE (1 of 3 - Primary Series) 1995 ---    IMM HEP A VACCINE (1 of 2 -  Standard Series) 2/9/1996 ---    IMM HPV VACCINE (1 of 3 - Female 3 Dose Series) 2/9/2006 ---    IMM VARICELLA (CHICKENPOX) VACCINE (1 of 2 - 2 Dose Adolescent Series) 2/9/2008 ---    IMM DTaP/Tdap/Td Vaccine (1 - Tdap) 2/9/2014 ---    PAP SMEAR 2/9/2016 ---            Results     POCT Urinalysis      Component Value Standard Range & Units    POC Color yellow Negative    POC Appearance clear Negative    POC Leukocyte Esterase negative Negative    POC Nitrites negative Negative    POC Urobiligen negative Negative (0.2) mg/dL    POC Protein negative Negative mg/dL    POC Urine PH 7.5 5.0 - 8.0    POC Blood negative Negative    POC Specific Gravity 1.005 <1.005 - >1.030    POC Ketones negative Negative mg/dL    POC Biliruben negative Negative mg/dL    POC Glucose negative Negative mg/dL                        Current Immunizations     No immunizations on file.      Below and/or attached are the medications your provider expects you to take. Review all of your home medications and newly ordered medications with your provider and/or pharmacist. Follow medication instructions as directed by your provider and/or pharmacist. Please keep your medication list with you and share with your provider. Update the information when medications are discontinued, doses are changed, or new medications (including over-the-counter products) are added; and carry medication information at all times in the event of emergency situations     Allergies:  No Known Allergies          Medications  Valid as of: May 11, 2017 -  2:22 PM    Generic Name Brand Name Tablet Size Instructions for use    ALPRAZolam (Tab) XANAX 0.25 MG Take 1 Tab by mouth 2 times a day as needed for Sleep or Anxiety.        DiphenhydrAMINE HCl   Take  by mouth.        Hydrocod Polst-Chlorphen Polst (Suspension Extended Release) TUSSIONEX 10-8 MG/5ML Take 5 mL by mouth every 12 hours as needed for Cough.        Ibuprofen (Tab) MOTRIN 800 MG Take 1 Tab by mouth every 8 hours  as needed.        Zolpidem Tartrate (Tab) AMBIEN 5 MG Take 5 mg by mouth at bedtime as needed for Sleep.        .                 Medicines prescribed today were sent to:     [facility] #[id number] - [place], [place] - [address] [place] [address] [place] [address]    Phone: [phone] Fax: [phone]    Open 24 Hours?: No      Medication refill instructions:       If your prescription bottle indicates you have medication refills left, it is not necessary to call your provider’s office. Please contact your pharmacy and they will refill your medication.    If your prescription bottle indicates you do not have any refills left, you may request refills at any time through one of the following ways: The online Gecko TV system (except Urgent Care), by calling your provider’s office, or by asking your pharmacy to contact your provider’s office with a refill request. Medication refills are processed only during regular business hours and may not be available until the next business day. Your provider may request additional information or to have a follow-up visit with you prior to refilling your medication.   *Please Note: Medication refills are assigned a new Rx number when refilled electronically. Your pharmacy may indicate that no refills were authorized even though a new prescription for the same medication is available at the pharmacy. Please request the medicine by name with the pharmacy before contacting your provider for a refill.        Your To Do List     Future Labs/Procedures Complete By Expires    US-OB PELVIS TRANSVAGINAL  As directed 5/11/2018         Gecko TV Access Code: [email or web address]  Expires: 6/6/2017 11:47 AM    Gecko TV  A secure, online tool to manage your health information     [employer]’s Gecko TV® is a secure, online tool that connects you to your personalized health information from the privacy of your home -- day or night - making it very easy for you to  manage your healthcare. Once the activation process is completed, you can even access your medical information using the HitFix sandra, which is available for free in the Apple Sandra store or Google Play store.     HitFix provides the following levels of access (as shown below):   My Chart Features   [facility] Doctor [facility]  Specialists [facility]  Urgent  Care Non-[facility]  Primary Care  Doctor   Email your healthcare team securely and privately 24/7 X X X    Manage appointments: schedule your next appointment; view details of past/upcoming appointments X      Request prescription refills. X      View recent personal medical records, including lab and immunizations X X X X   View health record, including health history, allergies, medications X X X X   Read reports about your outpatient visits, procedures, consult and ER notes X X X X   See your discharge summary, which is a recap of your hospital and/or ER visit that includes your diagnosis, lab results, and care plan. X X       How to register for HitFix:  1. Go to  [email or web address].  2. Click on the Sign Up Now box, which takes you to the New Member Sign Up page. You will need to provide the following information:  a. Enter your HitFix Access Code exactly as it appears at the top of this page. (You will not need to use this code after you’ve completed the sign-up process. If you do not sign up before the expiration date, you must request a new code.)   b. Enter your date of birth.   c. Enter your home email address.   d. Click Submit, and follow the next screen’s instructions.  3. Create a HitFix ID. This will be your HitFix login ID and cannot be changed, so think of one that is secure and easy to remember.  4. Create a HitFix password. You can change your password at any time.  5. Enter your Password Reset Question and Answer. This can be used at a later time if you forget your password.   6. Enter your e-mail address. This allows you to  receive e-mail notifications when new information is available in Button.  7. Click Sign Up. You can now view your health information.    For assistance activating your Button account, call [phone]

## 2017-05-11 NOTE — Clinical Note
May 11, 2017       Patient: Hannah Wiggins   YOB: 1995   Date of Visit: 5/11/2017         To Whom It May Concern:    It is my medical opinion that Hannah Wiggins should be excused from work for today due to illness.      If you have any questions or concerns, please don't hesitate to call [phone]          Sincerely,          Kd Benton PA-C  Electronically Signed

## 2017-05-16 ENCOUNTER — TELEPHONE (OUTPATIENT)
Dept: URGENT CARE | Facility: CLINIC | Age: 22
End: 2017-05-16

## 2017-11-06 ENCOUNTER — OFFICE VISIT (OUTPATIENT)
Dept: URGENT CARE | Facility: CLINIC | Age: 22
End: 2017-11-06
Payer: COMMERCIAL

## 2017-11-06 VITALS
HEART RATE: 102 BPM | SYSTOLIC BLOOD PRESSURE: 110 MMHG | WEIGHT: 145 LBS | BODY MASS INDEX: 23.3 KG/M2 | TEMPERATURE: 98 F | HEIGHT: 66 IN | OXYGEN SATURATION: 99 % | DIASTOLIC BLOOD PRESSURE: 64 MMHG

## 2017-11-06 DIAGNOSIS — J06.9 UPPER RESPIRATORY TRACT INFECTION, UNSPECIFIED TYPE: ICD-10-CM

## 2017-11-06 DIAGNOSIS — R05.9 COUGH: ICD-10-CM

## 2017-11-06 LAB
FLUAV+FLUBV AG SPEC QL IA: NEGATIVE
INT CON NEG: NEGATIVE
INT CON NEG: NEGATIVE
INT CON POS: POSITIVE
INT CON POS: POSITIVE
S PYO AG THROAT QL: NEGATIVE

## 2017-11-06 PROCEDURE — 87804 INFLUENZA ASSAY W/OPTIC: CPT | Performed by: PHYSICIAN ASSISTANT

## 2017-11-06 PROCEDURE — 87880 STREP A ASSAY W/OPTIC: CPT | Performed by: PHYSICIAN ASSISTANT

## 2017-11-06 PROCEDURE — 99214 OFFICE O/P EST MOD 30 MIN: CPT | Performed by: PHYSICIAN ASSISTANT

## 2017-11-06 ASSESSMENT — ENCOUNTER SYMPTOMS
FOCAL WEAKNESS: 0
NECK PAIN: 0
SENSORY CHANGE: 0
EYE REDNESS: 0
ABDOMINAL PAIN: 0
DIARRHEA: 0
FEVER: 1
SHORTNESS OF BREATH: 0
VOMITING: 0
WHEEZING: 0
BACK PAIN: 0
CHILLS: 0
SPUTUM PRODUCTION: 0
COUGH: 1
HEADACHES: 0
SORE THROAT: 1
EYE DISCHARGE: 0
NAUSEA: 0

## 2017-11-06 NOTE — PROGRESS NOTES
"Subjective:      Hannah Wiggins is a 22 y.o. female who presents with Cough (\"sinus pressure,phlem,poss bronchitis,pt is 31 weeks pregnant,ear pressure \"xthis morning )            Cough   Associated symptoms include a fever ( subj) and a sore throat. Pertinent negatives include no chills, ear pain ( pressure), eye redness, headaches, rash, shortness of breath or wheezing. There is no history of environmental allergies ( maybe).   today w/ bad sorethroat, sinus press, mild cough, ear pressure, burning throat, denies fever, c/o chills, c/o ear pressures, denies ear pain, denies nausea/vomiting/abdpain/diarrhea/rash, pt is now 31wks along in pregnancy, has appt at 3pm today w/ OB, denies PMH of asthma, PMH of bronchitis - last was w/in last year, denies PMH of pneumonia, denies seasonal allerg s/sx. Tried no meds today thus far. Sees OB today.     Review of Systems   Constitutional: Positive for fever ( subj). Negative for chills.   HENT: Positive for congestion and sore throat. Negative for ear pain ( pressure).    Eyes: Negative for discharge and redness.   Respiratory: Positive for cough. Negative for sputum production, shortness of breath and wheezing.    Gastrointestinal: Negative for abdominal pain, diarrhea, nausea and vomiting.   Musculoskeletal: Negative for back pain and neck pain.   Skin: Negative for rash.   Neurological: Negative for sensory change, focal weakness and headaches.   Endo/Heme/Allergies: Negative for environmental allergies ( maybe).     PMH:  has a past medical history of Anxiety.  MEDS:   Current Outpatient Prescriptions:   •  DiphenhydrAMINE HCl (BENADRYL PO), Take  by mouth., Disp: , Rfl:   •  alprazolam (XANAX) 0.25 MG Tab, Take 1 Tab by mouth 2 times a day as needed for Sleep or Anxiety., Disp: 15 Tab, Rfl: 0  •  zolpidem (AMBIEN) 5 MG Tab, Take 5 mg by mouth at bedtime as needed for Sleep., Disp: , Rfl:   •  ibuprofen (MOTRIN) 800 MG Tab, Take 1 Tab by mouth every 8 hours as " "needed., Disp: 20 Tab, Rfl: 3  •  Hydrocod Polst-CPM Polst ER (TUSSIONEX) 10-8 MG/5ML Suspension Extended Release, Take 5 mL by mouth every 12 hours as needed for Cough., Disp: 120 mL, Rfl: 0  ALLERGIES: No Known Allergies  SURGHX: History reviewed. No pertinent surgical history.  SOCHX:  reports that she has never smoked. She has never used smokeless tobacco. She reports that she does not drink alcohol or use drugs.  FH: Family history was reviewed, no pertinent findings to report    I have worn a mask for the entire encounter with this patient.        Objective:     /64   Pulse (!) 102   Temp 36.7 °C (98 °F)   Ht 1.676 m (5' 6\")   Wt 65.8 kg (145 lb)   SpO2 99%   BMI 23.40 kg/m²     Physical Exam   Constitutional: She is oriented to person, place, and time. She appears well-developed and well-nourished. No distress.   HENT:   Head: Normocephalic and atraumatic.   Right Ear: Tympanic membrane, external ear and ear canal normal.   Left Ear: Tympanic membrane, external ear and ear canal normal.   Nose: Nose normal. Right sinus exhibits no maxillary sinus tenderness and no frontal sinus tenderness. Left sinus exhibits no maxillary sinus tenderness and no frontal sinus tenderness.   Mouth/Throat: Uvula is midline and mucous membranes are normal. Posterior oropharyngeal erythema ( mild PND) present. No oropharyngeal exudate, posterior oropharyngeal edema or tonsillar abscesses.   Eyes: Conjunctivae and lids are normal. Right eye exhibits no discharge. Left eye exhibits no discharge. No scleral icterus.   Neck: Neck supple.   Pulmonary/Chest: Effort normal and breath sounds normal. No respiratory distress. She has no decreased breath sounds. She has no wheezes. She has no rhonchi. She has no rales.   Musculoskeletal: Normal range of motion.   Lymphadenopathy:     She has cervical adenopathy ( trace bilat ).   Neurological: She is alert and oriented to person, place, and time. She is not disoriented.   Skin: " Skin is warm and dry. She is not diaphoretic. No erythema. No pallor.   Psychiatric: Her speech is normal and behavior is normal.   Nursing note and vitals reviewed.    POCT flu - NEG  POCT strep - NEG     Assessment/Plan:     1. Cough  Supportive care is reviewed with patient/caregiver - recommend to push PO fluids and electrolytes, netti pot/saline irrig, humidifier in home, , ponaris, discuss other tx for URI / sinus congestion w/ OB today, sent w/ work note today  Return to clinic with lack of resolution or progression of symptoms.  ER precautions with any worsening symptoms are reviewed with patient/caregiver and they do express understanding  Recommend sinus rinse and netti pot for congestion  ,  - POCT Influenza A/B  - POCT Rapid Strep A    2. Upper respiratory tract infection, unspecified type      - POCT Influenza A/B  - POCT Rapid Strep A

## 2017-11-06 NOTE — LETTER
November 6, 2017       Patient: Hannah Wiggins   YOB: 1995   Date of Visit: 11/6/2017         To Whom It May Concern:    It is my medical opinion that Hannah Wiggins should be excused from work for today and tomorrow due to illness.        If you have any questions or concerns, please don't hesitate to call [phone]          Sincerely,          Kd Benton P.A.-C.  Electronically Signed

## 2017-12-10 ENCOUNTER — HOSPITAL ENCOUNTER (INPATIENT)
Facility: MEDICAL CENTER | Age: 22
LOS: 2 days | End: 2017-12-12
Attending: SPECIALIST | Admitting: SPECIALIST
Payer: COMMERCIAL

## 2017-12-10 LAB
A1 MICROGLOB PLACENTAL VAG QL: POSITIVE
BASOPHILS # BLD AUTO: 0.5 % (ref 0–1.8)
BASOPHILS # BLD: 0.05 K/UL (ref 0–0.12)
EOSINOPHIL # BLD AUTO: 0.11 K/UL (ref 0–0.51)
EOSINOPHIL NFR BLD: 1 % (ref 0–6.9)
ERYTHROCYTE [DISTWIDTH] IN BLOOD BY AUTOMATED COUNT: 40.2 FL (ref 35.9–50)
HCT VFR BLD AUTO: 32.4 % (ref 37–47)
HGB BLD-MCNC: 10.8 G/DL (ref 12–16)
HOLDING TUBE BB 8507: NORMAL
IMM GRANULOCYTES # BLD AUTO: 0.14 K/UL (ref 0–0.11)
IMM GRANULOCYTES NFR BLD AUTO: 1.3 % (ref 0–0.9)
LYMPHOCYTES # BLD AUTO: 2.48 K/UL (ref 1–4.8)
LYMPHOCYTES NFR BLD: 22.4 % (ref 22–41)
MCH RBC QN AUTO: 29.1 PG (ref 27–33)
MCHC RBC AUTO-ENTMCNC: 33.3 G/DL (ref 33.6–35)
MCV RBC AUTO: 87.3 FL (ref 81.4–97.8)
MONOCYTES # BLD AUTO: 0.89 K/UL (ref 0–0.85)
MONOCYTES NFR BLD AUTO: 8 % (ref 0–13.4)
NEUTROPHILS # BLD AUTO: 7.4 K/UL (ref 2–7.15)
NEUTROPHILS NFR BLD: 66.8 % (ref 44–72)
NRBC # BLD AUTO: 0 K/UL
NRBC BLD AUTO-RTO: 0 /100 WBC
PLATELET # BLD AUTO: 270 K/UL (ref 164–446)
PMV BLD AUTO: 11.1 FL (ref 9–12.9)
RBC # BLD AUTO: 3.71 M/UL (ref 4.2–5.4)
WBC # BLD AUTO: 11.1 K/UL (ref 4.8–10.8)

## 2017-12-10 PROCEDURE — 304965 HCHG RECOVERY SERVICES

## 2017-12-10 PROCEDURE — 700105 HCHG RX REV CODE 258: Performed by: SPECIALIST

## 2017-12-10 PROCEDURE — 84112 EVAL AMNIOTIC FLUID PROTEIN: CPT

## 2017-12-10 PROCEDURE — 36415 COLL VENOUS BLD VENIPUNCTURE: CPT

## 2017-12-10 PROCEDURE — 700102 HCHG RX REV CODE 250 W/ 637 OVERRIDE(OP): Performed by: SPECIALIST

## 2017-12-10 PROCEDURE — 0HQ9XZZ REPAIR PERINEUM SKIN, EXTERNAL APPROACH: ICD-10-PCS | Performed by: SPECIALIST

## 2017-12-10 PROCEDURE — 303615 HCHG EPIDURAL/SPINAL ANESTHESIA FOR LABOR

## 2017-12-10 PROCEDURE — 85025 COMPLETE CBC W/AUTO DIFF WBC: CPT

## 2017-12-10 PROCEDURE — A9270 NON-COVERED ITEM OR SERVICE: HCPCS | Performed by: SPECIALIST

## 2017-12-10 PROCEDURE — 700111 HCHG RX REV CODE 636 W/ 250 OVERRIDE (IP)

## 2017-12-10 PROCEDURE — 700105 HCHG RX REV CODE 258

## 2017-12-10 PROCEDURE — 4A1HX4Z MONITORING OF PRODUCTS OF CONCEPTION, CARDIAC ELECTRICAL ACTIVITY, EXTERNAL APPROACH: ICD-10-PCS | Performed by: SPECIALIST

## 2017-12-10 PROCEDURE — 700101 HCHG RX REV CODE 250

## 2017-12-10 PROCEDURE — 59409 OBSTETRICAL CARE: CPT

## 2017-12-10 PROCEDURE — 770002 HCHG ROOM/CARE - OB PRIVATE (112)

## 2017-12-10 PROCEDURE — 700111 HCHG RX REV CODE 636 W/ 250 OVERRIDE (IP): Performed by: SPECIALIST

## 2017-12-10 PROCEDURE — 3E033VJ INTRODUCTION OF OTHER HORMONE INTO PERIPHERAL VEIN, PERCUTANEOUS APPROACH: ICD-10-PCS | Performed by: SPECIALIST

## 2017-12-10 RX ORDER — OXYCODONE HYDROCHLORIDE AND ACETAMINOPHEN 5; 325 MG/1; MG/1
1 TABLET ORAL EVERY 4 HOURS PRN
Status: DISCONTINUED | OUTPATIENT
Start: 2017-12-10 | End: 2017-12-12 | Stop reason: HOSPADM

## 2017-12-10 RX ORDER — CARBOPROST TROMETHAMINE 250 UG/ML
250 INJECTION, SOLUTION INTRAMUSCULAR
Status: DISCONTINUED | OUTPATIENT
Start: 2017-12-10 | End: 2017-12-10 | Stop reason: HOSPADM

## 2017-12-10 RX ORDER — SODIUM CHLORIDE, SODIUM LACTATE, POTASSIUM CHLORIDE, CALCIUM CHLORIDE 600; 310; 30; 20 MG/100ML; MG/100ML; MG/100ML; MG/100ML
INJECTION, SOLUTION INTRAVENOUS
Status: COMPLETED
Start: 2017-12-10 | End: 2017-12-10

## 2017-12-10 RX ORDER — OXYTOCIN 10 [USP'U]/ML
10 INJECTION, SOLUTION INTRAMUSCULAR; INTRAVENOUS
Status: DISCONTINUED | OUTPATIENT
Start: 2017-12-10 | End: 2017-12-10 | Stop reason: HOSPADM

## 2017-12-10 RX ORDER — MISOPROSTOL 200 UG/1
600 TABLET ORAL
Status: DISCONTINUED | OUTPATIENT
Start: 2017-12-10 | End: 2017-12-12 | Stop reason: HOSPADM

## 2017-12-10 RX ORDER — ROPIVACAINE HYDROCHLORIDE 2 MG/ML
INJECTION, SOLUTION EPIDURAL; INFILTRATION; PERINEURAL
Status: COMPLETED
Start: 2017-12-10 | End: 2017-12-10

## 2017-12-10 RX ORDER — ONDANSETRON 2 MG/ML
INJECTION INTRAMUSCULAR; INTRAVENOUS
Status: ACTIVE
Start: 2017-12-10 | End: 2017-12-11

## 2017-12-10 RX ORDER — SODIUM CHLORIDE, SODIUM LACTATE, POTASSIUM CHLORIDE, CALCIUM CHLORIDE 600; 310; 30; 20 MG/100ML; MG/100ML; MG/100ML; MG/100ML
INJECTION, SOLUTION INTRAVENOUS CONTINUOUS
Status: DISPENSED | OUTPATIENT
Start: 2017-12-10 | End: 2017-12-10

## 2017-12-10 RX ORDER — METHYLERGONOVINE MALEATE 0.2 MG/ML
0.2 INJECTION INTRAVENOUS
Status: DISCONTINUED | OUTPATIENT
Start: 2017-12-10 | End: 2017-12-10 | Stop reason: HOSPADM

## 2017-12-10 RX ORDER — BISACODYL 10 MG
10 SUPPOSITORY, RECTAL RECTAL PRN
Status: DISCONTINUED | OUTPATIENT
Start: 2017-12-10 | End: 2017-12-12 | Stop reason: HOSPADM

## 2017-12-10 RX ORDER — IBUPROFEN 600 MG/1
600 TABLET ORAL EVERY 6 HOURS PRN
Status: DISCONTINUED | OUTPATIENT
Start: 2017-12-10 | End: 2017-12-12 | Stop reason: HOSPADM

## 2017-12-10 RX ORDER — MISOPROSTOL 200 UG/1
800 TABLET ORAL
Status: DISCONTINUED | OUTPATIENT
Start: 2017-12-10 | End: 2017-12-10 | Stop reason: HOSPADM

## 2017-12-10 RX ORDER — VITAMIN A ACETATE, BETA CAROTENE, ASCORBIC ACID, CHOLECALCIFEROL, .ALPHA.-TOCOPHEROL ACETATE, DL-, THIAMINE MONONITRATE, RIBOFLAVIN, NIACINAMIDE, PYRIDOXINE HYDROCHLORIDE, FOLIC ACID, CYANOCOBALAMIN, CALCIUM CARBONATE, FERROUS FUMARATE, ZINC OXIDE, CUPRIC OXIDE 3080; 12; 120; 400; 1; 1.84; 3; 20; 22; 920; 25; 200; 27; 10; 2 [IU]/1; UG/1; MG/1; [IU]/1; MG/1; MG/1; MG/1; MG/1; MG/1; [IU]/1; MG/1; MG/1; MG/1; MG/1; MG/1
1 TABLET, FILM COATED ORAL EVERY MORNING
Status: DISCONTINUED | OUTPATIENT
Start: 2017-12-11 | End: 2017-12-12 | Stop reason: HOSPADM

## 2017-12-10 RX ORDER — DOCUSATE SODIUM 100 MG/1
100 CAPSULE, LIQUID FILLED ORAL 2 TIMES DAILY PRN
Status: DISCONTINUED | OUTPATIENT
Start: 2017-12-10 | End: 2017-12-12 | Stop reason: HOSPADM

## 2017-12-10 RX ORDER — ONDANSETRON 2 MG/ML
4 INJECTION INTRAMUSCULAR; INTRAVENOUS EVERY 6 HOURS PRN
Status: DISCONTINUED | OUTPATIENT
Start: 2017-12-10 | End: 2017-12-12 | Stop reason: HOSPADM

## 2017-12-10 RX ORDER — ACETAMINOPHEN 325 MG/1
325 TABLET ORAL EVERY 4 HOURS PRN
Status: DISCONTINUED | OUTPATIENT
Start: 2017-12-10 | End: 2017-12-12 | Stop reason: HOSPADM

## 2017-12-10 RX ORDER — ONDANSETRON 4 MG/1
4 TABLET, ORALLY DISINTEGRATING ORAL EVERY 6 HOURS PRN
Status: DISCONTINUED | OUTPATIENT
Start: 2017-12-10 | End: 2017-12-12 | Stop reason: HOSPADM

## 2017-12-10 RX ORDER — SODIUM CHLORIDE, SODIUM LACTATE, POTASSIUM CHLORIDE, CALCIUM CHLORIDE 600; 310; 30; 20 MG/100ML; MG/100ML; MG/100ML; MG/100ML
INJECTION, SOLUTION INTRAVENOUS PRN
Status: DISCONTINUED | OUTPATIENT
Start: 2017-12-10 | End: 2017-12-12 | Stop reason: HOSPADM

## 2017-12-10 RX ORDER — OXYCODONE AND ACETAMINOPHEN 10; 325 MG/1; MG/1
1 TABLET ORAL EVERY 4 HOURS PRN
Status: DISCONTINUED | OUTPATIENT
Start: 2017-12-10 | End: 2017-12-12 | Stop reason: HOSPADM

## 2017-12-10 RX ADMIN — Medication 125 ML/HR: at 18:51

## 2017-12-10 RX ADMIN — Medication 1 MILLI-UNITS/MIN: at 01:48

## 2017-12-10 RX ADMIN — ROPIVACAINE HYDROCHLORIDE 100 ML: 2 INJECTION, SOLUTION EPIDURAL; INFILTRATION at 12:01

## 2017-12-10 RX ADMIN — SODIUM CHLORIDE, POTASSIUM CHLORIDE, SODIUM LACTATE AND CALCIUM CHLORIDE 1000 ML: 600; 310; 30; 20 INJECTION, SOLUTION INTRAVENOUS at 01:45

## 2017-12-10 RX ADMIN — ONDANSETRON 4 MG: 2 INJECTION INTRAMUSCULAR; INTRAVENOUS at 16:18

## 2017-12-10 RX ADMIN — IBUPROFEN 600 MG: 600 TABLET, FILM COATED ORAL at 22:57

## 2017-12-10 RX ADMIN — SODIUM CHLORIDE, POTASSIUM CHLORIDE, SODIUM LACTATE AND CALCIUM CHLORIDE 1000 ML: 600; 310; 30; 20 INJECTION, SOLUTION INTRAVENOUS at 11:37

## 2017-12-10 RX ADMIN — SODIUM CHLORIDE, POTASSIUM CHLORIDE, SODIUM LACTATE AND CALCIUM CHLORIDE: 600; 310; 30; 20 INJECTION, SOLUTION INTRAVENOUS at 09:16

## 2017-12-10 ASSESSMENT — PAIN SCALES - GENERAL
PAINLEVEL_OUTOF10: 0
PAINLEVEL_OUTOF10: 0
PAINLEVEL_OUTOF10: 5

## 2017-12-10 ASSESSMENT — LIFESTYLE VARIABLES
DO YOU DRINK ALCOHOL: NO
EVER_SMOKED: NEVER
ALCOHOL_USE: NO

## 2017-12-10 NOTE — PROGRESS NOTES
0200 Report received from FELIPA Ventura RN and [facility] care. POC discussed with pt and s/o and encouraged to state needs and questions at any time.    0710 Report given to JENNY Haile RN

## 2017-12-10 NOTE — PROGRESS NOTES
0700- Bedside report received from Sherri RN- poc discussed  0800- pt resting feeling uc's states they are getting stronger, but she is still comfortable  1150- pt sitting for epidural  1155- epidural placed  1230- montalvo placed  1400- sve 3/90/-2  1700- pt feeling pressure sve 7-8/90/-1, baby having earlies and occasional late, Dr. Madison notified  1735- pt feels like pushing sve complete/+3, Dr. chacon called for delivery  1756-  viable male infant apgars 8/9  1900- Bedside report given to Radha BIRD- poc discussed

## 2017-12-10 NOTE — CARE PLAN
Problem: Infection  Goal: Will remain free from infection    Intervention: Assess signs and symptoms of infection  Watching for s/s of infection since pt has been SROM since 12/9 @6457

## 2017-12-10 NOTE — PROGRESS NOTES
0028- EDC 2018 36.0 weeks presents to L&D with c/o SROM at 2345. Amnisure collected and sent to lab. SVE=fingertip/thick. Pt adis every 2-5 minutes but not feeling contractions.  0111-amnisure positive. Dr. Chacon called and orders rcvd to admit pt for labor and start pitocin for augmentation.   0130-pt transferred to [facility] via wheelchair. IV started, labs drawn and admit profile completed.  0200-report given to Sherri BIRD.

## 2017-12-10 NOTE — H&P
DATE OF ADMISSION:  12/10/2017    REASON FOR ADMISSION:  Spontaneous rupture of membranes.    HISTORY OF PRESENT ILLNESS:  This is a 22-year-old  1, para 0 at 36   weeks' gestation, who presents to labor and delivery with complaints of gush   of clear fluid at 2345.  Once spontaneous rupture of membranes was confirmed,   the patient was subsequently admitted.  She is not in labor.  Overall   reassuring fetal status is appreciated.  She is group B strep negative.    PAST MEDICAL HISTORY:  Migraine headaches.    PAST SURGICAL HISTORY:  None.    OBSTETRICAL HISTORY:  The patient is primigravida.    SOCIAL HISTORY:  She denies use of any alcohol, tobacco, or recreational drug   use.    MEDICATIONS:  Prenatal vitamins.    ALLERGIES:  No known drug allergies.    PHYSICAL EXAMINATION:  VITAL SIGNS:  She is afebrile, hemodynamically stable.  HEART:  Regular rate and rhythm.  CHEST:  Clear to auscultation bilaterally.  ABDOMEN:  Soft, gravid, nontender.  PELVIC:  Sterile vaginal exam:  Fingertip, 50% and -3 station.  EXTREMITIES:  Nontender.    LABORATORY DATA:  Prenatal care labs are all in order.    ASSESSMENT AND PLAN:  A 22-year-old  1, para 0, at 36 weeks gestation   with overall reassuring fetal status, now elects to proceed forward with   admission.  Given she is not in labor, we will plan on proceeding forward with   Pitocin augmentation, which may be increased per protocol and continue some   epidural to optimize pain management.       ____________________________________     MD S[facility]ADRIGAL / LING    DD:  12/10/2017 10:35:58  DT:  12/10/2017 10:50:33    D#:  [id number]  Job#:  [id number]

## 2017-12-10 NOTE — CARE PLAN
Problem: Pain  Goal: Patient will have relaxed facial expressions and be able to rest between uterine contractions    Intervention: Assess for nonverbal signs of ineffective coping with pain and offer pain medications and/or epidural anesthesia  Pt will let me know if she needs any pain medication or epidural

## 2017-12-11 LAB
ERYTHROCYTE [DISTWIDTH] IN BLOOD BY AUTOMATED COUNT: 40.7 FL (ref 35.9–50)
HCT VFR BLD AUTO: 31 % (ref 37–47)
HGB BLD-MCNC: 10.4 G/DL (ref 12–16)
MCH RBC QN AUTO: 29.5 PG (ref 27–33)
MCHC RBC AUTO-ENTMCNC: 33.5 G/DL (ref 33.6–35)
MCV RBC AUTO: 88.1 FL (ref 81.4–97.8)
PLATELET # BLD AUTO: 237 K/UL (ref 164–446)
PMV BLD AUTO: 11.1 FL (ref 9–12.9)
RBC # BLD AUTO: 3.52 M/UL (ref 4.2–5.4)
WBC # BLD AUTO: 16.3 K/UL (ref 4.8–10.8)

## 2017-12-11 PROCEDURE — 700112 HCHG RX REV CODE 229

## 2017-12-11 PROCEDURE — 770002 HCHG ROOM/CARE - OB PRIVATE (112)

## 2017-12-11 PROCEDURE — 700111 HCHG RX REV CODE 636 W/ 250 OVERRIDE (IP)

## 2017-12-11 PROCEDURE — 90715 TDAP VACCINE 7 YRS/> IM: CPT

## 2017-12-11 PROCEDURE — 3E0134Z INTRODUCTION OF SERUM, TOXOID AND VACCINE INTO SUBCUTANEOUS TISSUE, PERCUTANEOUS APPROACH: ICD-10-PCS | Performed by: SPECIALIST

## 2017-12-11 PROCEDURE — 36415 COLL VENOUS BLD VENIPUNCTURE: CPT

## 2017-12-11 PROCEDURE — 700102 HCHG RX REV CODE 250 W/ 637 OVERRIDE(OP): Performed by: SPECIALIST

## 2017-12-11 PROCEDURE — 90471 IMMUNIZATION ADMIN: CPT

## 2017-12-11 PROCEDURE — 90707 MMR VACCINE SC: CPT

## 2017-12-11 PROCEDURE — 3E0234Z INTRODUCTION OF SERUM, TOXOID AND VACCINE INTO MUSCLE, PERCUTANEOUS APPROACH: ICD-10-PCS | Performed by: SPECIALIST

## 2017-12-11 PROCEDURE — 85027 COMPLETE CBC AUTOMATED: CPT

## 2017-12-11 PROCEDURE — A9270 NON-COVERED ITEM OR SERVICE: HCPCS | Performed by: SPECIALIST

## 2017-12-11 RX ADMIN — MEASLES, MUMPS, AND RUBELLA VIRUS VACCINE LIVE 0.5 ML: 1000; 12500; 1000 INJECTION, POWDER, LYOPHILIZED, FOR SUSPENSION SUBCUTANEOUS at 10:10

## 2017-12-11 RX ADMIN — IBUPROFEN 600 MG: 600 TABLET, FILM COATED ORAL at 21:31

## 2017-12-11 RX ADMIN — IBUPROFEN 600 MG: 600 TABLET, FILM COATED ORAL at 07:46

## 2017-12-11 RX ADMIN — Medication 1 TABLET: at 07:46

## 2017-12-11 RX ADMIN — TETANUS TOXOID, REDUCED DIPHTHERIA TOXOID AND ACELLULAR PERTUSSIS VACCINE, ADSORBED 0.5 ML: 5; 2.5; 8; 8; 2.5 SUSPENSION INTRAMUSCULAR at 10:12

## 2017-12-11 ASSESSMENT — COPD QUESTIONNAIRES
DURING THE PAST 4 WEEKS HOW MUCH DID YOU FEEL SHORT OF BREATH: NONE/LITTLE OF THE TIME
DO YOU EVER COUGH UP ANY MUCUS OR PHLEGM?: NO/ONLY WITH OCCASIONAL COLDS OR INFECTIONS
HAVE YOU SMOKED AT LEAST 100 CIGARETTES IN YOUR ENTIRE LIFE: NO/DON'T KNOW
COPD SCREENING SCORE: 0

## 2017-12-11 ASSESSMENT — PATIENT HEALTH QUESTIONNAIRE - PHQ9
1. LITTLE INTEREST OR PLEASURE IN DOING THINGS: NOT AT ALL
2. FEELING DOWN, DEPRESSED, IRRITABLE, OR HOPELESS: NOT AT ALL
SUM OF ALL RESPONSES TO PHQ9 QUESTIONS 1 AND 2: 0
SUM OF ALL RESPONSES TO PHQ QUESTIONS 1-9: 0

## 2017-12-11 ASSESSMENT — PAIN SCALES - GENERAL
PAINLEVEL_OUTOF10: 0
PAINLEVEL_OUTOF10: 2
PAINLEVEL_OUTOF10: 6
PAINLEVEL_OUTOF10: 2
PAINLEVEL_OUTOF10: 0
PAINLEVEL_OUTOF10: 2

## 2017-12-11 ASSESSMENT — LIFESTYLE VARIABLES: DO YOU DRINK ALCOHOL: NO

## 2017-12-11 NOTE — PROGRESS NOTES
Reviewed at risk LPI feeding plan with mother. RN initiated pumping and supplementation overnight as infant had sub optimal latch scores, also with cold temp x1. Provided written educational information to mother. Plan to observe pumping and flange fit after next breast attempt.

## 2017-12-11 NOTE — CARE PLAN
Problem: Altered physiologic condition related to immediate post-delivery state and potential for bleeding/hemorrhage  Goal: Patient physiologically stable as evidenced by normal lochia, palpable uterine involution and vital signs within normal limits  Outcome: PROGRESSING AS EXPECTED  Fundus firm. Lochia light. Vital signs WDL.     Problem: Potential for postpartum infection related to presence of episiotomy/vaginal tear and/or uterine contamination  Goal: Patient will be absent from signs and symptoms of infection  Outcome: PROGRESSING AS EXPECTED  Patient is afebrile.  No signs and symptoms of infection.

## 2017-12-11 NOTE — PROGRESS NOTES
Radha RN brought pt from labor and delivery. ID bands and cuddles checked and verified with labor and delivery nurseRadha. Patient oriented to room and surroundings. Skylight discussed. Bulb syring demonstration. Educated on emergency call light. ID bands and security issues discussed. Educated about the pink photo ID name badges. Educated about not sleeping with infant, no carrying infant in halls, and no leaving infant unattended. Assessment completed on patient. WDL. Discussed pain management. IV without redness and swelling. FOB at bedside. Patient was assessed to restroom, pt able to ambulate safely. Pt able to void. Educated pt about feeding infant every 2-3 hours or when infant is showing cues. Pt encouraged to call for next feed to assess latch. Encourage pt to call for any needs.

## 2017-12-11 NOTE — CARE PLAN
Problem: Alteration in comfort related to episiotomy, vaginal repair and/or after birth pains  Goal: Patient verbalizes acceptable pain level  Patient verbalizes acceptable pain level    Problem: Potential knowledge deficit related to lack of understanding of self and  care  Goal: Patient will verbalize understanding of self and infant care  Patient verbalizes understanding care of infant and self.

## 2017-12-11 NOTE — PROGRESS NOTES
Received report and assumed care of patient from JENNY Sahni. Patient is resting comfortably at this time. POC reviewed, questions answered, needs met at this time.  2015 Patient transferred to PPU in stable condition, report given to JILLIAN

## 2017-12-11 NOTE — DELIVERY NOTE
DATE OF SERVICE:  12/10/2017    Briefly, this is a 22-year-old  1, para 0 at 36 weeks' gestation,  who   presented with spontaneous rupture of membranes, not in labor, received   Pitocin, increased per protocol, had a continuous lumbar epidural to optimize   pain management, progressed well during the course of the day, arrived at   complete/complete and +3, pushed well and over a few pushes subsequently   underwent a spontaneous vaginal delivery over first-degree midline laceration   without any nuchal cord with easy delivery of the shoulders and body.  Cord   was clamped and cut.  Infant was handed to waiting nursing staff.  Apgars were   8 and 9 at 1 and 5 minutes respectively.  Placenta was spontaneous and intact   with 3-vessel cord.  Two single interrupted sutures using 2-0 Vicryl were   used to reapproximate the laceration.  Patient tolerated labor and delivery   and repair well.    ESTIMATED BLOOD LOSS:  For the delivery, 250 mL.    COMPLICATIONS:  There were no complications.       ____________________________________     MD SOUTH MADRIGAL / LING    DD:  12/10/2017 18:08:26  DT:  12/10/2017 18:43:29    D#:  [id number]  Job#:  [id number]

## 2017-12-11 NOTE — PROGRESS NOTES
Progress Note    Subjective:   Doing well. No issues or concerns. Pain well controlled. No sig bleeding    Objective Data:  Recent Labs      12/10/17   0140  12/11/17   0337   WBC  11.1*  16.3*   RBC  3.71*  3.52*   HEMOGLOBIN  10.8*  10.4*   HEMATOCRIT  32.4*  31.0*   MCV  87.3  88.1   MCH  29.1  29.5   MCHC  33.3*  33.5*   RDW  40.2  40.7   PLATELETCT  270  237   MPV  11.1  11.1           Vitals:    12/10/17 2000 12/10/17 2020 12/11/17 0000 12/11/17 0400   BP: 114/74 112/72 (!) 91/61 (!) 98/68   Pulse: 88 96 85 83   Resp:  18 18 18   Temp:  36.7 °C (98.1 °F) 37.3 °C (99.1 °F) 36.1 °C (97 °F)   SpO2:  96% 95% 95%   Weight:       Height:         ABdomen: soft non tender fundus at umbilicus  Perineum: no sig bleeding  Ext:n on tender calves    No intake or output data in the 24 hours ending 12/11/17 0652    Current Facility-Administered Medications   Medication Dose Route Frequency Provider Last Rate Last Dose   • ondansetron (ZOFRAN ODT) dispertab 4 mg  4 mg Oral Q6HRS PRN Lj Madison M.D.        Or   • ondansetron (ZOFRAN) syringe/vial injection 4 mg  4 mg Intravenous Q6HRS PRN Lj Madison M.D.   4 mg at 12/10/17 1618   • oxytocin (PITOCIN) infusion (for postpartum)  2,000 mL/hr Intravenous Once Lj Madison M.D.        Followed by   • oxytocin (PITOCIN) infusion (for postpartum)   mL/hr Intravenous Continuous Lj Madison M.D. 125 mL/hr at 12/10/17 1851 125 mL/hr at 12/10/17 1851   • ibuprofen (MOTRIN) tablet 600 mg  600 mg Oral Q6HRS PRN Lj Madison M.D.   600 mg at 12/10/17 2257   • acetaminophen (TYLENOL) tablet 325 mg  325 mg Oral Q4HRS PRN Lj Madison M.D.       • oxycodone-acetaminophen (PERCOCET) 5-325 MG per tablet 1 Tab  1 Tab Oral Q4HRS PRN Lj Madison M.D.       • oxycodone-acetaminophen (PERCOCET-10)  MG per tablet 1 Tab  1 Tab Oral Q4HRS PRN Lj Madison M.D.       • LR infusion   Intravenous PRN Lj Madison M.D.       • misoprostol (CYTOTEC)  tablet 600 mcg  600 mcg Rectal Once PRN Lj Madison M.D.       • docusate sodium (COLACE) capsule 100 mg  100 mg Oral BID PRN Lj Madison M.D.       • bisacodyl (DULCOLAX) suppository 10 mg  10 mg Rectal PRN Lj Madison M.D.       • prenatal plus vitamin (STUARTNATAL 1+1) 27-1 MG tablet 1 Tab  1 Tab Oral QAM Lj Madison M.D.           A/P S/P . Doing well. No issues or concerns. Plan to proceed with the usual pp management and probable discharge home tomorrow.

## 2017-12-11 NOTE — OR SURGEON
Immediate Post OP Note        Estimated Blood Loss: 250ccs    Findings:  over 1rst degree mll without any nuchal cord with easy delivery of the shoulders and body with cord clamped and cut and handed to awaiting nursing staff. Apgars were 8/9 at one and five min and placenta spontaneous and intact with 3vc with two single interrupted 3.0 Vicryl sutures placed.    Complications: None        12/10/2017 6:03 PM Lj Madison

## 2017-12-12 VITALS
HEIGHT: 66 IN | TEMPERATURE: 98.1 F | BODY MASS INDEX: 24.27 KG/M2 | RESPIRATION RATE: 18 BRPM | OXYGEN SATURATION: 97 % | WEIGHT: 151 LBS | SYSTOLIC BLOOD PRESSURE: 107 MMHG | DIASTOLIC BLOOD PRESSURE: 73 MMHG | HEART RATE: 73 BPM

## 2017-12-12 PROCEDURE — A9270 NON-COVERED ITEM OR SERVICE: HCPCS | Performed by: SPECIALIST

## 2017-12-12 PROCEDURE — 700102 HCHG RX REV CODE 250 W/ 637 OVERRIDE(OP): Performed by: SPECIALIST

## 2017-12-12 RX ORDER — IBUPROFEN 600 MG/1
600 TABLET ORAL EVERY 6 HOURS PRN
Qty: 30 TAB | Refills: 0 | Status: SHIPPED | OUTPATIENT
Start: 2017-12-12 | End: 2019-03-12

## 2017-12-12 RX ADMIN — IBUPROFEN 600 MG: 600 TABLET, FILM COATED ORAL at 16:59

## 2017-12-12 RX ADMIN — Medication 1 TABLET: at 07:56

## 2017-12-12 RX ADMIN — IBUPROFEN 600 MG: 600 TABLET, FILM COATED ORAL at 07:56

## 2017-12-12 ASSESSMENT — PAIN SCALES - GENERAL
PAINLEVEL_OUTOF10: 2
PAINLEVEL_OUTOF10: 5

## 2017-12-12 NOTE — CARE PLAN
Problem: Altered physiologic condition related to immediate post-delivery state and potential for bleeding/hemorrhage  Goal: Patient physiologically stable as evidenced by normal lochia, palpable uterine involution and vital signs within normal limits  Outcome: PROGRESSING AS EXPECTED  Vital signs stable. Fundus firm, lochia light    Problem: Alteration in comfort related to episiotomy, vaginal repair and/or after birth pains  Goal: Patient verbalizes acceptable pain level  Outcome: PROGRESSING AS EXPECTED  Discussed 0-10 pain scale and available prn pain medications with pt. Pt states pain at acceptable level at this time

## 2017-12-12 NOTE — DISCHARGE SUMMARY
DATE OF ADMISSION:  12/10/2017.    DATE OF DISCHARGE:  2017.    DISCHARGE DIAGNOSES:  1.  Status post spontaneous rupture of membranes with a  delivery at 36   weeks' gestation.  2.  Uncomplicated postpartum course.    HISTORY OF PRESENT ILLNESS:  This is a 22-year-old  1, para 0 at 36   weeks' gestation who presented to labor and delivery with complaints of gush   of clear fluid on the evening just prior to admission.  The patient did have   confirmed spontaneous rupture of membranes, was not in labor, was group B   strep negative.  Overall, reassuring fetal status was appreciated.  The   patient was admitted.    PAST MEDICAL HISTORY AND PHYSICAL EXAMINATION:  Can be found in dictated   history and physical.    ASSESSMENT AND PLAN:  A 22-year-old  1, para 0 at 36 weeks' gestation   with overall reassuring fetal status, now elects to proceed forward with   admission and Pitocin augmentation.    HOSPITAL COURSE:  As stated above, the patient did have Pitocin, which was   increased per protocol, had an artificial rupture of membranes, had continuous   epidural, progressed to complete-complete and +3, subsequently underwent a   spontaneous vaginal delivery.  Apgars were 8 and 9 at 1 and 5 minutes   respectively.  Her postpartum course was unremarkable and on postpartum day   #2, she had met all discharge criteria.  She was felt to be appropriate for   discharge.    Discharge plan to follow up in 6 weeks.    DISCHARGE INSTRUCTIONS:  She was to call with any increased temperature   greater than 100.4, increasing vaginal bleeding, abdominal pain unrelieved   with any p.o. pain medication or call with any other questions or concerns.    DISCHARGE MEDICATION:  Ibuprofen.       ____________________________________     MD SOUTH MADRIGAL / LING    DD:  2017 06:49:12  DT:  2017 07:13:13    D#:  [id number]  Job#:  [id number]

## 2017-12-12 NOTE — PROGRESS NOTES
Received report from ROBERT Mcknight RN. Assessment complete. Pt states pain at acceptable level. Discussed pain management plan with pt. Pt will call for PRN pain meds. Pt educated on the dangers of sleeping with infant. Call light within reach. Pt encouraged to call with needs or concerns.

## 2017-12-12 NOTE — PROGRESS NOTES
Progress Note    Subjective:   Doing well. No issues or concerns. Patients pain well controlled with Ibuprofen. No sig bleeding    Objective Data:  Recent Labs      12/10/17   0140  12/11/17   0337   WBC  11.1*  16.3*   RBC  3.71*  3.52*   HEMOGLOBIN  10.8*  10.4*   HEMATOCRIT  32.4*  31.0*   MCV  87.3  88.1   MCH  29.1  29.5   MCHC  33.3*  33.5*   RDW  40.2  40.7   PLATELETCT  270  237   MPV  11.1  11.1           Vitals:    12/11/17 0000 12/11/17 0400 12/11/17 0800 12/11/17 2000   BP: (!) 91/61 (!) 98/68 104/56 (!) 99/68   Pulse: 85 83 83 79   Resp: 18 18 20 16   Temp: 37.3 °C (99.1 °F) 36.1 °C (97 °F) 36.7 °C (98 °F) 36.9 °C (98.4 °F)   SpO2: 95% 95% 98% 97%   Weight:       Height:         Abdomen: soft non tender fundus at umbilicus  Perineum: no sig lochia rubra  Ext:non tender calves    No intake or output data in the 24 hours ending 12/12/17 0649    Current Facility-Administered Medications   Medication Dose Route Frequency Provider Last Rate Last Dose   • ondansetron (ZOFRAN ODT) dispertab 4 mg  4 mg Oral Q6HRS PRN Lj Madison M.D.        Or   • ondansetron (ZOFRAN) syringe/vial injection 4 mg  4 mg Intravenous Q6HRS PRN Lj Madison M.D.   4 mg at 12/10/17 1618   • oxytocin (PITOCIN) infusion (for postpartum)   mL/hr Intravenous Continuous Lj Madison M.D. 125 mL/hr at 12/10/17 1851 125 mL/hr at 12/10/17 1851   • ibuprofen (MOTRIN) tablet 600 mg  600 mg Oral Q6HRS PRN Lj Madison M.D.   600 mg at 12/11/17 2131   • acetaminophen (TYLENOL) tablet 325 mg  325 mg Oral Q4HRS PRN Lj Madison M.D.       • oxycodone-acetaminophen (PERCOCET) 5-325 MG per tablet 1 Tab  1 Tab Oral Q4HRS PRN Lj Madison M.D.       • oxycodone-acetaminophen (PERCOCET-10)  MG per tablet 1 Tab  1 Tab Oral Q4HRS PRN Lj Madison M.D.       • LR infusion   Intravenous PRN Lj Madison M.D.       • misoprostol (CYTOTEC) tablet 600 mcg  600 mcg Rectal Once PRN Lj Madison M.D.       •  docusate sodium (COLACE) capsule 100 mg  100 mg Oral BID PRN Lj Madison M.D.       • bisacodyl (DULCOLAX) suppository 10 mg  10 mg Rectal PRN Lj Madison M.D.       • prenatal plus vitamin (STUARTNATAL 1+1) 27-1 MG tablet 1 Tab  1 Tab Oral QAM Lj Madison M.D.   1 Tab at 17 0746       A/P S/P . Doing well. No issues or concerns. Plan to discharge home with f/u in 6 weeks. Discharge instructions given. All questions were answered and precuations were given.

## 2017-12-12 NOTE — PROGRESS NOTES
Pt in stable condition - VSS. Assessment done, bleeding wnl, fundus firm.  Plan of care discussed. Call light placed within reach. Pt educated regarding safe sleeping for infant and the importance of putting baby in the crib, on back, when mother would like to nap. All questions answered. Pain management plan discussed, pt would like her pain meds PRN. Pt pumping and has a breast feeding plan to attempt to feed, pump, and supplement with donor milk per [facility]PI supplemental guidelines.

## 2017-12-12 NOTE — DISCHARGE INSTRUCTIONS
POSTPARTUM DISCHARGE INSTRUCTIONS FOR MOM    YOB: 1995   Age: 22 y.o.               Admit Date: 12/10/2017     Discharge Date: 2017  Attending Doctor:  Lj Madison M.D.                  Allergies:  Patient has no known allergies.    Discharged to home by car. Discharged via wheelchair, hospital escort: Yes.  Special equipment needed: Not Applicable  Belongings with: Personal  Be sure to schedule a follow-up appointment with your primary care doctor or any specialists as instructed.     Discharge Plan:   Diet Plan: Discussed  Activity Level: Discussed  Confirmed Follow up Appointment: Patient to Call and Schedule Appointment  Confirmed Symptoms Management: Discussed  Medication Reconciliation Updated: No (Comments)  Influenza Vaccine Indication: Patient Refuses    REASONS TO CALL YOUR OBSTETRICIAN:  1.   Persistent fever or shaking chills (Temperature higher than 100.4)  2.   Heavy bleeding (soaking more than 1 pad per hour); Passing clots  3.   Foul odor from vagina  4.   Mastitis (Breast infection; breast pain, chills, fever, redness)  5.   Urinary pain, burning or frequency  6.   Episiotomy infection  7.   Abdominal incision infection  8.   Severe depression longer than 24 hours    HAND WASHING  · Prior to handling the baby.  · Before breastfeeding or bottle feeding baby.  · After using the bathroom or changing the baby's diaper.    WOUND CARE  Ask your physician for additional care instructions.  In general:    ·  Incision:      · Keep clean and dry.    · Do NOT lift anything heavier than your baby for up to 6 weeks.    · There should not be any opening or pus.      VAGINAL CARE  · Nothing inside vagina for 6 weeks: no sexual intercourse, tampons or douching.  · Bleeding may continue for 2-4 weeks.  Amount may vary.    · Call your physician for heavy bleeding which means soaking more than 1 pad per hour    BIRTH CONTROL  · It is possible to become pregnant at any time after delivery  "and while breastfeeding.  · Plan to discuss a method of birth control with your physician at your follow up visit. visit.    DIET AND ELIMINATION  · Eating more fiber (bran cereal, fruits, and vegetables) and drinking plenty of fluids will help to avoid constipation.  · Urinary frequency after childbirth is normal.    POSTPARTUM BLUES  During the first few days after birth, you may experience a sense of the \"blues\" which may include impatience, irritability or even crying.  These feeling come and go quickly.  However, as many as 1 in 10 women experience emotional symptoms known as postpartum depression.    Postpartum depression:  May start as early as the second or third day after delivery or take several weeks or months to develop.  Symptoms of \"blues\" are present, but are more intense:  Crying spells; loss of appetite; feelings of hopelessness or loss of control; fear of touching the baby; over concern or no concern at all about the baby; little or no concern about your own appearance/caring for yourself; and/or inability to sleep or excessive sleeping.  Contact your physician if you are experiencing any of these symptoms.    Crisis Hotline:  · [place] Crisis Hotline:  3-657-NYGFPRT  Or 1-746.707.8560  · [place] Crisis Hotline:  1-741.730.5824  Or 693-474-2935    PREVENTING SHAKEN BABY:  If you are angry or stressed, PUT THE BABY IN THE CRIB, step away, take some deep breaths, and wait until you are calm to care for the baby.  DO NOT SHAKE THE BABY.  You are not alone, call a supporter for help.    · [facility] Center 24/7 crisis line 334-772-2232 or 1-606.197.1604  · You can also text them, text \"ANSWER\" to 095399    QUIT SMOKING/TOBACCO USE:  I understand the use of any tobacco products increases my chance of suffering from future heart disease and could cause other illnesses which may shorten my life. Quitting the use of tobacco products is the single most important thing I can do to improve my health. For " further information on smoking / tobacco cessation call a Toll Free Quit Line at 1-700.525.9631 (*National [employer] [place]) or 1-590.641.1535 (American Lung Association) or you can access the web based program at www.lungusa.org.    · [place] Tobacco Users Help Line:  (656) 338-1569       Toll Free: 1-444.350.8459  · Quit Tobacco Program [facility] (651)632-9970    DEPRESSION / SUICIDE RISK:  As you are discharged from this [facility] [place], it is important to learn how to keep safe from harming yourself.    Recognize the warning signs:  · Abrupt changes in personality, positive or negative- including increase in energy   · Giving away possessions  · Change in eating patterns- significant weight changes-  positive or negative  · Change in sleeping patterns- unable to sleep or sleeping all the time   · Unwillingness or inability to communicate  · Depression  · Unusual sadness, discouragement and loneliness  · Talk of wanting to die  · Neglect of personal appearance   · Rebelliousness- reckless behavior  · Withdrawal from people/activities they love  · Confusion- inability to concentrate     If you or a loved one observes any of these behaviors or has concerns about self-harm, here's what you can do:  · Talk about it- your feelings and reasons for harming yourself  · Remove any means that you might use to hurt yourself (examples: pills, rope, extension cords, firearm)  · Get professional help from the community (Mental Health, Substance Abuse, psychological counseling)  · Do not be alone:Call your Safe Contact- someone whom you trust who will be there for you.  · Call your local CRISIS HOTLINE 923-6798 or 927-163-7146  · Call your local Children's Mobile Crisis Response Team [place] (110) 804-9928 or www.[place]  · Call the toll free National Suicide Prevention Hotlines   · National Suicide Prevention Lifeline 718-867-SZQT (6194)  · National Hope Line Network 800-SUICIDE  (068-5978)    DISCHARGE SURVEY:  Thank you for choosing [facility].  We hope we provided you with very good care.  You may be receiving a survey in the mail.  Please fill it out.  Your opinion is valuable to us.    ADDITIONAL EDUCATIONAL MATERIALS GIVEN TO PATIENT:        My signature on this form indicates that:  1.  I have reviewed and understand the above information  2.  My questions regarding this information have been answered to my satisfaction.  3.  I have formulated a plan with my discharge nurse to obtain my prescribed medication for home.

## 2017-12-13 NOTE — PROGRESS NOTES
Pt education and discharge instructions reviewed with pt and pt states understanding.  Pt states that all questions have been answered and denies any problems. Pt discharged home in stable condition with all belongings.

## 2017-12-20 ENCOUNTER — HOSPITAL ENCOUNTER (OUTPATIENT)
Facility: MEDICAL CENTER | Age: 22
End: 2017-12-20
Attending: SPECIALIST | Admitting: SPECIALIST
Payer: COMMERCIAL

## 2018-01-13 ENCOUNTER — OFFICE VISIT (OUTPATIENT)
Dept: URGENT CARE | Facility: PHYSICIAN GROUP | Age: 23
End: 2018-01-13
Payer: COMMERCIAL

## 2018-01-13 VITALS
OXYGEN SATURATION: 96 % | SYSTOLIC BLOOD PRESSURE: 118 MMHG | HEART RATE: 89 BPM | RESPIRATION RATE: 14 BRPM | BODY MASS INDEX: 21.24 KG/M2 | DIASTOLIC BLOOD PRESSURE: 70 MMHG | HEIGHT: 66 IN | TEMPERATURE: 98.2 F | WEIGHT: 132.2 LBS

## 2018-01-13 DIAGNOSIS — N64.4 BREAST TENDERNESS: ICD-10-CM

## 2018-01-13 DIAGNOSIS — R50.9 FEVER, UNSPECIFIED FEVER CAUSE: ICD-10-CM

## 2018-01-13 LAB
FLUAV+FLUBV AG SPEC QL IA: NEGATIVE
INT CON NEG: NEGATIVE
INT CON POS: POSITIVE

## 2018-01-13 PROCEDURE — 99214 OFFICE O/P EST MOD 30 MIN: CPT | Performed by: PHYSICIAN ASSISTANT

## 2018-01-13 PROCEDURE — 87804 INFLUENZA ASSAY W/OPTIC: CPT | Performed by: PHYSICIAN ASSISTANT

## 2018-01-13 RX ORDER — CEPHALEXIN 500 MG/1
500 CAPSULE ORAL 4 TIMES DAILY
Qty: 28 CAP | Refills: 0 | Status: SHIPPED | OUTPATIENT
Start: 2018-01-13 | End: 2018-01-20

## 2018-01-13 ASSESSMENT — ENCOUNTER SYMPTOMS
NAUSEA: 0
VOMITING: 0
FEVER: 1
DIARRHEA: 0
CHILLS: 0
SHORTNESS OF BREATH: 0
ABDOMINAL PAIN: 0
SORE THROAT: 0

## 2018-01-13 NOTE — PROGRESS NOTES
"Subjective:   Hannah Wiggins is a 22 y.o. female who presents for Fever (poss mastitis painfull on r breast )        Fever    This is a new problem. Pertinent negatives include no abdominal pain, congestion, diarrhea, ear pain, nausea, rash, sore throat or vomiting.   notes last 2d of fever/chills, mild ST, denies much cough, denies nausea/vomiting, notes has been bottle fed w/ pumping, poor latching on, notes redness/fullness to right breast, fever / chills, denies meds today, denies nausea/voimtint/abdpain/diarrhea /rash. Minimal redness and change in appearance of breast. NOted very mild ST onset just now... Denies earlier today or yesterday. Notes tenderness and fullness to right breast, denies change in mild production.     Review of Systems   Constitutional: Positive for fever. Negative for chills and malaise/fatigue.   HENT: Negative for congestion, ear pain and sore throat.    Respiratory: Negative for shortness of breath.    Gastrointestinal: Negative for abdominal pain, diarrhea, nausea and vomiting.   Skin: Negative for rash.        POS for increased right breast tenderness     No Known Allergies     I have worn a mask for the entire encounter with this patient.      Objective:   /70   Pulse 89   Temp 36.8 °C (98.2 °F)   Resp 14   Ht 1.676 m (5' 6\")   Wt 60 kg (132 lb 3.2 oz)   SpO2 96%   BMI 21.34 kg/m²   Physical Exam   Constitutional: She is oriented to person, place, and time. She appears well-developed and well-nourished. No distress.   HENT:   Head: Normocephalic and atraumatic.   Right Ear: External ear normal.   Left Ear: External ear normal.   Nose: Nose normal.   Eyes: Conjunctivae and lids are normal. Right eye exhibits no discharge. Left eye exhibits no discharge. No scleral icterus.   Neck: Neck supple.   Pulmonary/Chest: Effort normal. No respiratory distress. Right breast exhibits skin change and tenderness. Right breast exhibits no inverted nipple, no mass and no " nipple discharge. Left breast exhibits no inverted nipple, no mass, no nipple discharge, no skin change and no tenderness. Breasts are symmetrical. There is no breast swelling.       Genitourinary: No breast discharge or bleeding.   Musculoskeletal: Normal range of motion.   Neurological: She is alert and oriented to person, place, and time. She is not disoriented.   Skin: Skin is warm and dry. She is not diaphoretic. No erythema. No pallor.   Psychiatric: Her speech is normal and behavior is normal.   Nursing note and vitals reviewed.        Assessment/Plan:   Assessment    1. Breast tenderness  Supportive care is reviewed with patient/caregiver - recommend to push PO fluids and electrolytes, continue w/ warm compresses, pumping and feeding, trend increased warmth/ redness, Contingent antibiotic prescription given to patient to fill upon meeting criteria of guidelines discussed.   If filling,  take full course of Rx, take with probiotics, observe for resolution  Return to clinic with lack of resolution or progression of symptoms.    F/u w/ OB    - cephALEXin (KEFLEX) 500 MG Cap; Take 1 Cap by mouth 4 times a day for 7 days.  Dispense: 28 Cap; Refill: 0    2. Fever, unspecified fever cause    - POCT Influenza A/B

## 2018-02-05 ENCOUNTER — HOSPITAL ENCOUNTER (OUTPATIENT)
Facility: MEDICAL CENTER | Age: 23
End: 2018-02-05
Attending: SPECIALIST
Payer: COMMERCIAL

## 2018-02-05 PROCEDURE — 87491 CHLMYD TRACH DNA AMP PROBE: CPT

## 2018-02-05 PROCEDURE — 87624 HPV HI-RISK TYP POOLED RSLT: CPT

## 2018-02-05 PROCEDURE — 87591 N.GONORRHOEAE DNA AMP PROB: CPT

## 2018-02-05 PROCEDURE — 88175 CYTOPATH C/V AUTO FLUID REDO: CPT

## 2018-02-06 LAB
C TRACH DNA GENITAL QL NAA+PROBE: NEGATIVE
CYTOLOGY REG CYTOL: ABNORMAL
HPV HR 12 DNA CVX QL NAA+PROBE: POSITIVE
HPV16 DNA SPEC QL NAA+PROBE: NEGATIVE
HPV18 DNA SPEC QL NAA+PROBE: NEGATIVE
N GONORRHOEA DNA GENITAL QL NAA+PROBE: NEGATIVE
SPECIMEN SOURCE: ABNORMAL
SPECIMEN SOURCE: ABNORMAL

## 2018-05-03 ENCOUNTER — OFFICE VISIT (OUTPATIENT)
Dept: URGENT CARE | Facility: PHYSICIAN GROUP | Age: 23
End: 2018-05-03
Payer: COMMERCIAL

## 2018-05-03 VITALS
RESPIRATION RATE: 14 BRPM | WEIGHT: 132 LBS | OXYGEN SATURATION: 96 % | DIASTOLIC BLOOD PRESSURE: 56 MMHG | HEIGHT: 66 IN | BODY MASS INDEX: 21.21 KG/M2 | TEMPERATURE: 98.1 F | HEART RATE: 92 BPM | SYSTOLIC BLOOD PRESSURE: 120 MMHG

## 2018-05-03 DIAGNOSIS — L03.032 PARONYCHIA OF TOE OF LEFT FOOT: ICD-10-CM

## 2018-05-03 PROCEDURE — 99214 OFFICE O/P EST MOD 30 MIN: CPT | Performed by: PHYSICIAN ASSISTANT

## 2018-05-03 RX ORDER — AMOXICILLIN 875 MG/1
875 TABLET, COATED ORAL 2 TIMES DAILY
Qty: 14 TAB | Refills: 0 | Status: SHIPPED | OUTPATIENT
Start: 2018-05-03 | End: 2018-05-10

## 2018-05-03 ASSESSMENT — ENCOUNTER SYMPTOMS
SENSORY CHANGE: 0
EYE REDNESS: 0
TINGLING: 0
COUGH: 0
ABDOMINAL PAIN: 0
CHILLS: 0
SORE THROAT: 0
JOINT SWELLING: 0
FALLS: 0
DIARRHEA: 0
FATIGUE: 0
FEVER: 0
VISUAL CHANGE: 0

## 2018-05-03 NOTE — LETTER
May 3, 2018         Patient: Hannah Wiggins   YOB: 1995   Date of Visit: 5/3/2018           To Whom it May Concern:    Hannah Wiggins was seen in my clinic on 5/3/2018. Please excuse this patient from work due to recent ailment- she may return tomorrow only if symptoms have improved.     If you have any questions or concerns, please don't hesitate to call.        Sincerely,           Hector Farrar P.A.-C.  Electronically Signed

## 2018-05-03 NOTE — PROGRESS NOTES
"Subjective:      Hannah Wiggins is a 23 y.o. female who presents with toe pain since last night.         Patient is a 23-year-old female who presents today with left great toe pain since this morning. She admits that she bumped her toe on Sunday morning when she admitted that it was more painful than it should've been and she looked down and noted underneath the side of the nail was some discharge. She does report a history of similar  Of same in the past and needed this drained. She denies any significant redness or swelling. She reports that she placed some pressure over the nail and more pus was drained prior to arrival. Of note patient reports that she is currently breast feeding.       Nail Problem   This is a new problem. The current episode started yesterday. The problem occurs constantly. The problem has been gradually worsening. Pertinent negatives include no abdominal pain, chills, congestion, coughing, fatigue, fever, joint swelling, rash, sore throat or visual change. Exacerbated by: Walking or pressure over her nail.  Treatments tried: As above.        Review of Systems   Constitutional: Negative for chills, fatigue, fever and malaise/fatigue.   HENT: Negative for congestion and sore throat.    Eyes: Negative for redness.   Respiratory: Negative for cough.    Cardiovascular: Negative for leg swelling.   Gastrointestinal: Negative for abdominal pain and diarrhea.   Musculoskeletal: Negative for falls, joint pain and joint swelling.   Skin: Negative for itching and rash.   Neurological: Negative for tingling and sensory change.   All other systems reviewed and are negative.         Objective:     /56   Pulse 92   Temp 36.7 °C (98.1 °F)   Resp 14   Ht 1.676 m (5' 6\")   Wt 59.9 kg (132 lb)   SpO2 96%   BMI 21.31 kg/m²    PMH:  has a past medical history of Anxiety.  MEDS:   Current Outpatient Prescriptions:   •  amoxicillin (AMOXIL) 875 MG tablet, Take 1 Tab by mouth 2 times a day " for 7 days., Disp: 14 Tab, Rfl: 0  •  ibuprofen (MOTRIN) 600 MG Tab, Take 1 Tab by mouth every 6 hours as needed (For cramping after delivery; do not give if patient is receiving ketorolac (Toradol))., Disp: 30 Tab, Rfl: 0  •  DiphenhydrAMINE HCl (BENADRYL PO), Take  by mouth., Disp: , Rfl:   •  alprazolam (XANAX) 0.25 MG Tab, Take 1 Tab by mouth 2 times a day as needed for Sleep or Anxiety., Disp: 15 Tab, Rfl: 0  •  zolpidem (AMBIEN) 5 MG Tab, Take 5 mg by mouth at bedtime as needed for Sleep., Disp: , Rfl:   •  ibuprofen (MOTRIN) 800 MG Tab, Take 1 Tab by mouth every 8 hours as needed., Disp: 20 Tab, Rfl: 3  •  Hydrocod Polst-CPM Polst ER (TUSSIONEX) 10-8 MG/5ML Suspension Extended Release, Take 5 mL by mouth every 12 hours as needed for Cough., Disp: 120 mL, Rfl: 0  ALLERGIES: No Known Allergies  SURGHX: No past surgical history on file.  SOCHX:  reports that she has never smoked. She has never used smokeless tobacco. She reports that she does not drink alcohol or use drugs.  FH: Family history was reviewed, no pertinent findings to report    Physical Exam   Constitutional: She is oriented to person, place, and time. She appears well-developed and well-nourished. No distress.   HENT:   Head: Normocephalic and atraumatic.   Eyes: Conjunctivae and EOM are normal. Pupils are equal, round, and reactive to light.   Neck: Normal range of motion. Neck supple. No tracheal deviation present.   Cardiovascular: Normal rate.    Pulmonary/Chest: Effort normal. No respiratory distress.   Musculoskeletal: Normal range of motion. She exhibits no edema.        Feet:    Lateral aspect of the left great toe- area of purulent discharge along the nail- minimal surrounding erythema, without increased warmth or significant tenderness. Area was cleansed with Betadine ×3. Was punctuate area was punctured with 18 Kolby needle- pressure applied- small amount of purulent discharge was expelled. Patient tolerated well. Wound dressing was  applied.   Neurological: She is alert and oriented to person, place, and time. Coordination normal.   Skin: Skin is warm. No rash noted.   Psychiatric: She has a normal mood and affect. Her behavior is normal. Judgment and thought content normal.   Vitals reviewed.              Assessment/Plan:     1. Paronychia of toe of left foot  - amoxicillin (AMOXIL) 875 MG tablet; Take 1 Tab by mouth 2 times a day for 7 days.  Dispense: 14 Tab; Refill: 0    2. Patient is a currently breast-feeding mother    Patient is to start one hot soaks. Discussed that indeed amoxicillin is excreted into the breast milk and patient is to monitor signs and symptoms of infant. She is also to let her pediatrician aware she is currently on amoxicillin for such. His antibiotic was chosen over others due to breast-feeding status of mother. Work note written.  Patient given precautionary s/sx that mandate immediate follow up and evaluation in the ED. Advised of risks of not doing so.    DDX, Supportive care, and indications for immediate follow-up discussed with patient.    Instructed to return to clinic or nearest emergency department if we are not available for any change in condition, further concerns, or worsening of symptoms.    The patient demonstrated a good understanding and agreed with the treatment plan.

## 2018-05-30 ENCOUNTER — OFFICE VISIT (OUTPATIENT)
Dept: BEHAVIORAL HEALTH | Facility: PHYSICIAN GROUP | Age: 23
End: 2018-05-30
Payer: COMMERCIAL

## 2018-05-30 DIAGNOSIS — F41.9 ANXIETY DISORDER, UNSPECIFIED TYPE: ICD-10-CM

## 2018-05-30 PROCEDURE — 90791 PSYCH DIAGNOSTIC EVALUATION: CPT | Performed by: SOCIAL WORKER

## 2018-05-31 PROBLEM — F41.9 ANXIETY DISORDER, UNSPECIFIED: Status: ACTIVE | Noted: 2018-05-31

## 2018-08-27 ENCOUNTER — OFFICE VISIT (OUTPATIENT)
Dept: BEHAVIORAL HEALTH | Facility: PHYSICIAN GROUP | Age: 23
End: 2018-08-27
Payer: COMMERCIAL

## 2018-08-27 DIAGNOSIS — F41.9 ANXIETY DISORDER, UNSPECIFIED TYPE: ICD-10-CM

## 2018-08-27 PROCEDURE — 90834 PSYTX W PT 45 MINUTES: CPT | Performed by: SOCIAL WORKER
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
## 2018-09-10 ENCOUNTER — OFFICE VISIT (OUTPATIENT)
Dept: BEHAVIORAL HEALTH | Facility: PHYSICIAN GROUP | Age: 23
End: 2018-09-10
Payer: COMMERCIAL

## 2018-09-10 DIAGNOSIS — F41.9 ANXIETY DISORDER, UNSPECIFIED TYPE: ICD-10-CM

## 2018-09-10 PROCEDURE — 90834 PSYTX W PT 45 MINUTES: CPT | Performed by: SOCIAL WORKER
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
## 2018-10-08 ENCOUNTER — OFFICE VISIT (OUTPATIENT)
Dept: BEHAVIORAL HEALTH | Facility: CLINIC | Age: 23
End: 2018-10-08
Payer: COMMERCIAL

## 2018-10-08 DIAGNOSIS — F41.9 ANXIETY DISORDER, UNSPECIFIED TYPE: ICD-10-CM

## 2018-10-08 PROCEDURE — 90834 PSYTX W PT 45 MINUTES: CPT | Performed by: SOCIAL WORKER
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
## 2018-10-24 ENCOUNTER — APPOINTMENT (OUTPATIENT)
Dept: BEHAVIORAL HEALTH | Facility: CLINIC | Age: 23
End: 2018-10-24
Payer: COMMERCIAL

## 2018-12-19 ENCOUNTER — OFFICE VISIT (OUTPATIENT)
Dept: BEHAVIORAL HEALTH | Facility: CLINIC | Age: 23
End: 2018-12-19
Payer: COMMERCIAL

## 2018-12-19 DIAGNOSIS — F41.9 ANXIETY DISORDER, UNSPECIFIED TYPE: ICD-10-CM

## 2018-12-19 PROCEDURE — 90834 PSYTX W PT 45 MINUTES: CPT | Performed by: SOCIAL WORKER
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
## 2019-01-09 ENCOUNTER — OFFICE VISIT (OUTPATIENT)
Dept: BEHAVIORAL HEALTH | Facility: CLINIC | Age: 24
End: 2019-01-09
Payer: COMMERCIAL

## 2019-01-09 DIAGNOSIS — F41.9 ANXIETY DISORDER, UNSPECIFIED TYPE: ICD-10-CM

## 2019-01-09 PROCEDURE — 90834 PSYTX W PT 45 MINUTES: CPT | Performed by: SOCIAL WORKER

## 2019-01-09 NOTE — BH THERAPY
[facility]  Therapy Progress Note    Patient Name: Hannah Wiggins  Patient MRN: [id number]  Today's Date: 1/9/2019     Type of session:Individual psychotherapy  Length of session: 45 minutes  Persons in attendance:Patient    Subjective/New Info: Pt states her 1 y o son needs open heart surgery to repair a torn valve.  Family will need to travel to  for procedure.  Pt states relationship w/ is more calm, but she does not fully trust him, feels trapped.  Pt starting new job at [employer].      Objective/Observations:   Participation: Active verbal participation   Grooming: Casual and Neat   Cognition: Fully Oriented   Eye contact: Good   Mood: Mildly dysphoric   Affect: Flexible and Congruent with content   Thought process: Goal-directed   Speech: Rate within normal limits and Volume within normal limits   Other:     Diagnoses:   1. Anxiety disorder, unspecified type         Current risk:   SUICIDE: Low   Homicide: Low   Self-harm: Low   Relapse: Not applicable   Other:    Safety Plan reviewed? Not Indicated   If evidence of imminent risk is present, intervention/plan:     Therapeutic Intervention(s): Cognitive modification, Positive behavior reinforced, Stressors assessed and Supportive psychotherapy    Treatment Goal(s)/Objective(s) addressed: Set healthy boundaries in intimate relationship     Progress toward Treatment Goals: Mild improvement    Plan:  - Continue Individual therapy    Nolvia Gonzalez L.C.S.W.  1/9/2019

## 2019-03-12 ENCOUNTER — OFFICE VISIT (OUTPATIENT)
Dept: URGENT CARE | Facility: CLINIC | Age: 24
End: 2019-03-12
Payer: COMMERCIAL

## 2019-03-12 VITALS
OXYGEN SATURATION: 96 % | DIASTOLIC BLOOD PRESSURE: 64 MMHG | HEART RATE: 94 BPM | WEIGHT: 132 LBS | SYSTOLIC BLOOD PRESSURE: 110 MMHG | RESPIRATION RATE: 16 BRPM | TEMPERATURE: 98.6 F | HEIGHT: 66 IN | BODY MASS INDEX: 21.21 KG/M2

## 2019-03-12 DIAGNOSIS — B37.9 ANTIBIOTIC-INDUCED YEAST INFECTION: ICD-10-CM

## 2019-03-12 DIAGNOSIS — J06.9 URI WITH COUGH AND CONGESTION: ICD-10-CM

## 2019-03-12 DIAGNOSIS — J40 BRONCHITIS: ICD-10-CM

## 2019-03-12 DIAGNOSIS — T36.95XA ANTIBIOTIC-INDUCED YEAST INFECTION: ICD-10-CM

## 2019-03-12 DIAGNOSIS — J01.40 ACUTE NON-RECURRENT PANSINUSITIS: Primary | ICD-10-CM

## 2019-03-12 PROCEDURE — 99214 OFFICE O/P EST MOD 30 MIN: CPT | Performed by: PHYSICIAN ASSISTANT

## 2019-03-12 RX ORDER — FLUCONAZOLE 150 MG/1
TABLET ORAL
Qty: 3 TAB | Refills: 0 | Status: SHIPPED | OUTPATIENT
Start: 2019-03-12 | End: 2021-12-22

## 2019-03-12 RX ORDER — DOXYCYCLINE HYCLATE 100 MG
100 TABLET ORAL 2 TIMES DAILY
Qty: 14 TAB | Refills: 0 | Status: SHIPPED | OUTPATIENT
Start: 2019-03-12 | End: 2019-03-19

## 2019-03-12 RX ORDER — PROMETHAZINE HYDROCHLORIDE AND CODEINE PHOSPHATE 6.25; 1 MG/5ML; MG/5ML
5 SYRUP ORAL 4 TIMES DAILY PRN
Qty: 120 ML | Refills: 0 | Status: SHIPPED | OUTPATIENT
Start: 2019-03-12 | End: 2019-03-19

## 2019-03-12 RX ORDER — METHYLPREDNISOLONE 4 MG/1
TABLET ORAL
Qty: 21 TAB | Refills: 0 | Status: SHIPPED | OUTPATIENT
Start: 2019-03-12 | End: 2021-12-07

## 2019-03-12 NOTE — PROGRESS NOTES
Subjective:      Pt is a 24 y.o. female who presents with Cough (started 5 days ago, sinus pain, dry cough, ear pressure, stomach upset 2 days ago.)            HPI  This is a new problem. Pt states just back to the area from [place] where her son had open heart surgery and notes likely caught UR from hospital in [facility]. PT presents to  clinic today complaining of sore throat, fevers, pressure in ears, cough, fatigue, runny nose, wheezing and SOB. PT denies CP, NVD, abdominal pain, joint pain. PT states these symptoms began around 2 days ago. PT states the pain is a 7/10 with coughing fits, aching in nature and worse at night. Pt has not taken any RX medications for this condition. The pt's medication list, problem list, and allergies have been evaluated and reviewed during today's visit.    PMH:  Past Medical History:   Diagnosis Date   • Anxiety        PSH:  Negative per pt.      Fam Hx:    family history includes Depression in her maternal aunt.  Family Status   Relation Status   • MAunt Alive       Soc HX:  Social History     Social History   • Marital status:      Spouse name: N/A   • Number of children: N/A   • Years of education: N/A     Occupational History   • Not on file.     Social History Main Topics   • Smoking status: Never Smoker   • Smokeless tobacco: Never Used   • Alcohol use No   • Drug use: No   • Sexual activity: Not on file     Other Topics Concern   • Not on file     Social History Narrative   • No narrative on file         Medications:    Current Outpatient Prescriptions:   •  doxycycline (VIBRAMYCIN) 100 MG Tab, Take 1 Tab by mouth 2 times a day for 7 days., Disp: 14 Tab, Rfl: 0  •  MethylPREDNISolone (MEDROL DOSEPAK) 4 MG Tablet Therapy Pack, Use as directed, Disp: 21 Tab, Rfl: 0  •  promethazine-codeine (PHENERGAN-CODEINE) 6.25-10 MG/5ML Syrup, Take 5 mL by mouth 4 times a day as needed for up to 7 days., Disp: 120 mL, Rfl: 0      Allergies:  Patient has no known  "allergies.    ROS  Review of Systems   Constitutional: Positive for malaise/fatigue. Negative for fever and diaphoresis.   HENT: Positive for congestion and sore throat. Negative for ear discharge, hearing loss, nosebleeds and tinnitus.    Eyes: Negative for blurred vision, double vision and photophobia.   Respiratory: Positive for cough, sputum production, shortness of breath and wheezing. Negative for hemoptysis.    Cardiovascular: Negative for chest pain and palpitations.   Gastrointestinal: Negative for nausea, vomiting, abdominal pain, diarrhea and constipation.   Genitourinary: Negative for dysuria and flank pain.   Musculoskeletal: Negative for joint pain and myalgias.   Skin: Negative for itching and rash.   Neurological:  Negative for dizziness, tingling and weakness.   Endo/Heme/Allergies: Does not bruise/bleed easily.   Psychiatric/Behavioral: Negative for depression. The patient is not nervous/anxious.             Objective:     /64 (BP Location: Left arm, Patient Position: Sitting, BP Cuff Size: Adult)   Pulse 94   Temp 37 °C (98.6 °F) (Temporal)   Resp 16   Ht 1.676 m (5' 6\")   Wt 59.9 kg (132 lb)   SpO2 96%   BMI 21.31 kg/m²      Physical Exam       Physical Exam   Constitutional: PT is oriented to person, place, and time. PT appears well-developed and well-nourished. No distress.   HENT:   Head: Normocephalic and atraumatic.   Right Ear: Hearing, tympanic membrane, external ear and ear canal normal.   Left Ear: Hearing, tympanic membrane, external ear and ear canal normal.   Nose: Mucosal edema, rhinorrhea and sinus tenderness present. Right sinus exhibits frontal sinus tenderness. Left sinus exhibits frontal sinus tenderness.   Mouth/Throat: Uvula is midline. Mucous membranes are pale. Posterior oropharyngeal edema and posterior oropharyngeal erythema present. No oropharyngeal exudate.   Eyes: Conjunctivae normal and EOM are normal. Pupils are equal, round, and reactive to light. Right " eye exhibits no discharge. Left eye exhibits no discharge.   Neck: Normal range of motion. Neck supple. No thyromegaly present.   Cardiovascular: Normal rate, regular rhythm, normal heart sounds and intact distal pulses.  Exam reveals no gallop and no friction rub.    No murmur heard.  Pulmonary/Chest: Effort normal. No respiratory distress. PT has wheezes. PT has no rales. PT exhibits tenderness.   Abdominal: Soft. Bowel sounds are normal. PT exhibits no distension and no mass. There is no tenderness. There is no rebound and no guarding.   Musculoskeletal: Normal range of motion. PT exhibits no edema and no tenderness.   Lymphadenopathy:     PT has no cervical adenopathy.   Neurological: Pt is alert and oriented to person, place, and time. Pt has normal reflexes. No cranial nerve deficit.   Skin: Skin is warm and dry. No rash noted. No erythema.   Psychiatric: PT has a normal mood and affect. Pt behavior is normal. Judgment and thought content normal.        Assessment/Plan:     1. Acute non-recurrent pansinusitis    - doxycycline (VIBRAMYCIN) 100 MG Tab; Take 1 Tab by mouth 2 times a day for 7 days.  Dispense: 14 Tab; Refill: 0  - MethylPREDNISolone (MEDROL DOSEPAK) 4 MG Tablet Therapy Pack; Use as directed  Dispense: 21 Tab; Refill: 0    2. Bronchitis    - doxycycline (VIBRAMYCIN) 100 MG Tab; Take 1 Tab by mouth 2 times a day for 7 days.  Dispense: 14 Tab; Refill: 0  - MethylPREDNISolone (MEDROL DOSEPAK) 4 MG Tablet Therapy Pack; Use as directed  Dispense: 21 Tab; Refill: 0    3. URI with cough and congestion    - MethylPREDNISolone (MEDROL DOSEPAK) 4 MG Tablet Therapy Pack; Use as directed  Dispense: 21 Tab; Refill: 0  - promethazine-codeine (PHENERGAN-CODEINE) 6.25-10 MG/5ML Syrup; Take 5 mL by mouth 4 times a day as needed for up to 7 days.  Dispense: 120 mL; Refill: 0    Concern for worsening symptoms of URI and worsening Bronchitis which shortly could transition to pneumonia and worsening sinus congestion  and infection with powerful cough keeping pt up at night as they must sleep upright to avoid coughing fits.  Diff DX: Bronchitis, Sinusitis, Pneumonia, Influenza, Viral URI, Allergies  Rest, fluids encouraged.  OTC decongestant for congestion/cough  AVS with medical info given.  Pt was in full understanding and agreement with the plan.  Differential diagnosis, natural history, supportive care, and indications for immediate follow-up discussed. All questions answered. Patient agrees with the plan of care.  Follow-up as needed if symptoms worsen or fail to improve.

## 2019-03-25 ENCOUNTER — OFFICE VISIT (OUTPATIENT)
Dept: BEHAVIORAL HEALTH | Facility: CLINIC | Age: 24
End: 2019-03-25
Payer: COMMERCIAL

## 2019-03-25 DIAGNOSIS — F41.9 ANXIETY DISORDER, UNSPECIFIED TYPE: ICD-10-CM

## 2019-03-25 PROCEDURE — 90834 PSYTX W PT 45 MINUTES: CPT | Performed by: SOCIAL WORKER
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
## 2019-04-24 ENCOUNTER — APPOINTMENT (OUTPATIENT)
Dept: BEHAVIORAL HEALTH | Facility: CLINIC | Age: 24
End: 2019-04-24
Payer: COMMERCIAL

## 2019-05-22 ENCOUNTER — APPOINTMENT (OUTPATIENT)
Dept: BEHAVIORAL HEALTH | Facility: CLINIC | Age: 24
End: 2019-05-22
Payer: COMMERCIAL

## 2019-07-01 ENCOUNTER — OFFICE VISIT (OUTPATIENT)
Dept: BEHAVIORAL HEALTH | Facility: CLINIC | Age: 24
End: 2019-07-01
Payer: COMMERCIAL

## 2019-07-01 DIAGNOSIS — F41.9 ANXIETY DISORDER, UNSPECIFIED TYPE: ICD-10-CM

## 2019-07-01 PROCEDURE — 90834 PSYTX W PT 45 MINUTES: CPT | Performed by: SOCIAL WORKER
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
## 2019-07-15 ENCOUNTER — APPOINTMENT (OUTPATIENT)
Dept: BEHAVIORAL HEALTH | Facility: CLINIC | Age: 24
End: 2019-07-15
Payer: COMMERCIAL

## 2019-10-08 ENCOUNTER — OFFICE VISIT (OUTPATIENT)
Dept: URGENT CARE | Facility: MEDICAL CENTER | Age: 24
End: 2019-10-08
Payer: COMMERCIAL

## 2019-10-08 VITALS
WEIGHT: 127 LBS | BODY MASS INDEX: 20.41 KG/M2 | DIASTOLIC BLOOD PRESSURE: 76 MMHG | TEMPERATURE: 98.4 F | HEART RATE: 93 BPM | OXYGEN SATURATION: 97 % | HEIGHT: 66 IN | SYSTOLIC BLOOD PRESSURE: 110 MMHG

## 2019-10-08 DIAGNOSIS — N93.9 VAGINAL BLEEDING: ICD-10-CM

## 2019-10-08 DIAGNOSIS — R10.2 PELVIC PAIN: ICD-10-CM

## 2019-10-08 DIAGNOSIS — J02.9 SORE THROAT: ICD-10-CM

## 2019-10-08 LAB
INT CON NEG: NORMAL
INT CON NEG: NORMAL
INT CON POS: NORMAL
INT CON POS: NORMAL
POC URINE PREGNANCY TEST: NORMAL
S PYO AG THROAT QL: NORMAL

## 2019-10-08 PROCEDURE — 99214 OFFICE O/P EST MOD 30 MIN: CPT | Performed by: NURSE PRACTITIONER

## 2019-10-08 PROCEDURE — 81025 URINE PREGNANCY TEST: CPT | Performed by: NURSE PRACTITIONER

## 2019-10-08 PROCEDURE — 87880 STREP A ASSAY W/OPTIC: CPT | Performed by: NURSE PRACTITIONER

## 2019-10-08 ASSESSMENT — ENCOUNTER SYMPTOMS
SORE THROAT: 1
COUGH: 1
FEVER: 0
CHILLS: 0
RHINORRHEA: 1
SHORTNESS OF BREATH: 1
WHEEZING: 1

## 2019-10-08 NOTE — PROGRESS NOTES
Subjective:      Hannah Wiggins is a 24 y.o. female who presents with Menstrual Problem (concerned shes getting bronchitis, sore throat x2days // went 2 months without a period, sunday had a lot of cramping, monday had a lot of blood clots)    Past Medical History:   Diagnosis Date   • Anxiety      Social History     Socioeconomic History   • Marital status:      Spouse name: Not on file   • Number of children: Not on file   • Years of education: Not on file   • Highest education level: Not on file   Occupational History   • Not on file   Social Needs   • Financial resource strain: Not on file   • Food insecurity:     Worry: Not on file     Inability: Not on file   • Transportation needs:     Medical: Not on file     Non-medical: Not on file   Tobacco Use   • Smoking status: Never Smoker   • Smokeless tobacco: Never Used   Substance and Sexual Activity   • Alcohol use: No   • Drug use: No   • Sexual activity: Not on file   Lifestyle   • Physical activity:     Days per week: Not on file     Minutes per session: Not on file   • Stress: Not on file   Relationships   • Social connections:     Talks on phone: Not on file     Gets together: Not on file     Attends Spiritism service: Not on file     Active member of club or organization: Not on file     Attends meetings of clubs or organizations: Not on file     Relationship status: Not on file   • Intimate partner violence:     Fear of current or ex partner: Not on file     Emotionally abused: Not on file     Physically abused: Not on file     Forced sexual activity: Not on file   Other Topics Concern   • Not on file   Social History Narrative   • Not on file     Family History   Problem Relation Age of Onset   • Depression Maternal Aunt        Allergies: Patient has no known allergies.    Patient is a 24-year-old female who presents today with 2 complaints.  First, patient complains of sore throat and dry cough.  Symptoms started over the last 3 to 4  "days.  Denies any tightness in her chest or shortness of breath.  No fever, aches, or chills.    Secondly, patient states that a little less than 48 hours ago she began to have heavy bleeding and lower abdominal cramping.  States that she does have an IUD, is sexually active.  Patient states initially that her bleeding was heavy but has lightened since.  States she is periodically passing small clots at this point, although she did pass some larger clots initially.          Cough   This is a new problem. The current episode started in the past 7 days. The problem has been unchanged. The problem occurs every few minutes. The cough is non-productive. Associated symptoms include rhinorrhea, a sore throat, shortness of breath and wheezing. Pertinent negatives include no chills or fever. Nothing aggravates the symptoms. She has tried nothing for the symptoms. The treatment provided no relief. Her past medical history is significant for bronchitis.       Review of Systems   Constitutional: Positive for malaise/fatigue. Negative for chills and fever.   HENT: Positive for congestion, rhinorrhea and sore throat.    Respiratory: Positive for cough, shortness of breath and wheezing.    Genitourinary:        Heavy menstrual bleeding   Skin: Negative.    All other systems reviewed and are negative.         Objective:     /76   Pulse 93   Temp 36.9 °C (98.4 °F) (Temporal)   Ht 1.664 m (5' 5.5\")   Wt 57.6 kg (127 lb)   SpO2 97%   BMI 20.81 kg/m²      Physical Exam   Constitutional: She is oriented to person, place, and time. She appears well-developed and well-nourished. No distress.   HENT:   Head: Normocephalic.   Right Ear: External ear normal.   Left Ear: External ear normal.   Nose: Nose normal.   Mouth/Throat: Oropharynx is clear and moist.   Eyes: Pupils are equal, round, and reactive to light. Conjunctivae and EOM are normal.   Neck: Normal range of motion. Neck supple.   Cardiovascular: Normal rate, regular " rhythm and normal heart sounds.   Pulmonary/Chest: Effort normal and breath sounds normal.   Abdominal: Soft. There is tenderness. There is no rebound and no guarding.   Mild tenderness over the lower abdomen   Musculoskeletal: Normal range of motion.   Neurological: She is alert and oriented to person, place, and time.   Skin: Skin is warm and dry. She is not diaphoretic.   Psychiatric: She has a normal mood and affect. Her behavior is normal. Judgment and thought content normal.   Vitals reviewed.    Urine hCG: Negative  UA:    Point-of-care strep: Negative          Assessment/Plan:   Viral upper respiratory infection  Dysmenorrhea    Warm salt water gargles  Tylenol/Motrin as needed  Push fluids  Humidifier  Pelvic ultrasound ordered; will call results  Follow-up also with primary care physician  There are no diagnoses linked to this encounter.
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
## 2021-12-07 ENCOUNTER — OFFICE VISIT (OUTPATIENT)
Dept: URGENT CARE | Facility: CLINIC | Age: 26
End: 2021-12-07
Payer: COMMERCIAL

## 2021-12-07 VITALS
SYSTOLIC BLOOD PRESSURE: 122 MMHG | HEIGHT: 65 IN | HEART RATE: 86 BPM | TEMPERATURE: 99.3 F | WEIGHT: 128 LBS | BODY MASS INDEX: 21.33 KG/M2 | OXYGEN SATURATION: 95 % | DIASTOLIC BLOOD PRESSURE: 80 MMHG | RESPIRATION RATE: 20 BRPM

## 2021-12-07 DIAGNOSIS — H65.191 ACUTE EFFUSION OF RIGHT EAR: ICD-10-CM

## 2021-12-07 PROCEDURE — 99213 OFFICE O/P EST LOW 20 MIN: CPT | Performed by: NURSE PRACTITIONER

## 2021-12-07 RX ORDER — METHYLPREDNISOLONE 4 MG/1
TABLET ORAL
Qty: 21 TABLET | Refills: 0 | Status: SHIPPED | OUTPATIENT
Start: 2021-12-07 | End: 2021-12-22

## 2021-12-07 ASSESSMENT — ENCOUNTER SYMPTOMS
NAUSEA: 1
FEVER: 0
VOMITING: 0
HEADACHES: 1
DIZZINESS: 1
CHILLS: 0

## 2021-12-07 NOTE — LETTER
December 7, 2021    To Whom It May Concern:         This is confirmation that Hannah Perea attended her scheduled appointment with AMY Beard on 12/07/21.  It is suggested that she refrain from wearing headsets for the next 7 days while she heal form an acute ear effusion.        If you have any questions please do not hesitate to call me at the phone number listed below.    Sincerely,          RAY Beard.  [phone]

## 2021-12-08 NOTE — PROGRESS NOTES
Subjective:     Hannah Perea is a 26 y.o. female who presents for Ear Fullness (x 6 months, feels fluid mainly in Rt ear, pressure, headaches and discomfort, had nausea, more frequent x 2 months)      Otalgia   There is pain in both (Hannah is a pleasant 26 year old female who presents to  today for complaints of intermittent otalgia and ear fullness that presented 6 month ago) ears. The current episode started more than 1 month ago. The problem has been waxing and waning. There has been no fever. The pain is mild. Associated symptoms include headaches. Pertinent negatives include no ear discharge, hearing loss or vomiting. Associated symptoms comments: nausea. She has tried nothing for the symptoms.   She states that her job does require her to wear headsets 9 hours a day.  She denies any recent swimming in pools or lakes.       Review of Systems   Constitutional: Negative for chills and fever.   HENT: Positive for ear pain. Negative for ear discharge, hearing loss and tinnitus.    Gastrointestinal: Positive for nausea. Negative for vomiting.   Neurological: Positive for dizziness and headaches.       PMH:   Past Medical History:   Diagnosis Date   • Anxiety      ALLERGIES: No Known Allergies  SURGHX: History reviewed. No pertinent surgical history.  SOCHX:   Social History     Socioeconomic History   • Marital status:      Spouse name: Not on file   • Number of children: Not on file   • Years of education: Not on file   • Highest education level: Not on file   Occupational History   • Not on file   Tobacco Use   • Smoking status: Never Smoker   • Smokeless tobacco: Never Used   Vaping Use   • Vaping Use: Never used   Substance and Sexual Activity   • Alcohol use: No   • Drug use: No   • Sexual activity: Not on file   Other Topics Concern   • Not on file   Social History Narrative   • Not on file     Social Determinants of Health     Financial Resource Strain:    • Difficulty of Paying  "Living Expenses: Not on file   Food Insecurity:    • Worried About Running Out of Food in the Last Year: Not on file   • Ran Out of Food in the Last Year: Not on file   Transportation Needs:    • Lack of Transportation (Medical): Not on file   • Lack of Transportation (Non-Medical): Not on file   Physical Activity:    • Days of Exercise per Week: Not on file   • Minutes of Exercise per Session: Not on file   Stress:    • Feeling of Stress : Not on file   Social Connections:    • Frequency of Communication with Friends and Family: Not on file   • Frequency of Social Gatherings with Friends and Family: Not on file   • Attends Rastafarian Services: Not on file   • Active Member of Clubs or Organizations: Not on file   • Attends Club or Organization Meetings: Not on file   • Marital Status: Not on file   Intimate Partner Violence:    • Fear of Current or Ex-Partner: Not on file   • Emotionally Abused: Not on file   • Physically Abused: Not on file   • Sexually Abused: Not on file   Housing Stability:    • Unable to Pay for Housing in the Last Year: Not on file   • Number of Places Lived in the Last Year: Not on file   • Unstable Housing in the Last Year: Not on file     FH:   Family History   Problem Relation Age of Onset   • Depression Maternal Aunt          Objective:   /80 (BP Location: Left arm, Patient Position: Sitting, BP Cuff Size: Adult)   Pulse 86   Temp 37.4 °C (99.3 °F) (Temporal)   Resp 20   Ht 1.651 m (5' 5\")   Wt 58.1 kg (128 lb)   SpO2 95%   Breastfeeding No   BMI 21.30 kg/m²     Physical Exam  Vitals and nursing note reviewed.   Constitutional:       General: She is not in acute distress.     Appearance: Normal appearance. She is not ill-appearing.   HENT:      Head: Normocephalic and atraumatic.      Right Ear: External ear normal. No drainage, swelling or tenderness. A middle ear effusion is present. There is no impacted cerumen. No mastoid tenderness. Tympanic membrane is not injected, " retracted or bulging.      Left Ear: External ear normal.  No middle ear effusion. There is no impacted cerumen. No mastoid tenderness. Tympanic membrane is not injected, retracted or bulging.      Nose: No congestion or rhinorrhea.      Mouth/Throat:      Mouth: Mucous membranes are moist.   Eyes:      Extraocular Movements: Extraocular movements intact.      Pupils: Pupils are equal, round, and reactive to light.   Cardiovascular:      Rate and Rhythm: Normal rate and regular rhythm.      Pulses: Normal pulses.      Heart sounds: Normal heart sounds.   Pulmonary:      Effort: Pulmonary effort is normal.      Breath sounds: Normal breath sounds.   Abdominal:      General: Abdomen is flat. Bowel sounds are normal.      Palpations: Abdomen is soft.      Tenderness: There is no abdominal tenderness. There is no right CVA tenderness or left CVA tenderness.   Musculoskeletal:         General: Normal range of motion.      Cervical back: Normal range of motion and neck supple.   Skin:     General: Skin is warm and dry.      Capillary Refill: Capillary refill takes less than 2 seconds.   Neurological:      General: No focal deficit present.      Mental Status: She is alert and oriented to person, place, and time. Mental status is at baseline.   Psychiatric:         Mood and Affect: Mood normal.         Behavior: Behavior normal.         Thought Content: Thought content normal.         Judgment: Judgment normal.         Assessment/Plan:   Assessment    1. Acute effusion of right ear  methylPREDNISolone (MEDROL DOSEPAK) 4 MG Tablet Therapy Pack     As her symptoms have been ongoing intermittently for the past 6 months she was prescribed Medrol Dosepak for relief of inflammation.  She was also encouraged to use over-the-counter ear drying drops, Flonase and Zyrtec.  If symptoms remain persistent following treatment, she will be referred to follow-up with ENT.  She verbalizes agreement and understanding to plan of care.  SHANE  handout given and reviewed with patient. Pt educated on red flags and when to seek treatment back in ER or UC.

## 2021-12-22 ENCOUNTER — OFFICE VISIT (OUTPATIENT)
Dept: URGENT CARE | Facility: CLINIC | Age: 26
End: 2021-12-22
Payer: COMMERCIAL

## 2021-12-22 VITALS
WEIGHT: 128 LBS | RESPIRATION RATE: 12 BRPM | DIASTOLIC BLOOD PRESSURE: 70 MMHG | OXYGEN SATURATION: 98 % | SYSTOLIC BLOOD PRESSURE: 106 MMHG | TEMPERATURE: 99.5 F | HEIGHT: 65 IN | HEART RATE: 101 BPM | BODY MASS INDEX: 21.33 KG/M2

## 2021-12-22 DIAGNOSIS — R59.9 ADENOPATHY: ICD-10-CM

## 2021-12-22 DIAGNOSIS — H93.8X3 SENSATION OF FULLNESS IN BOTH EARS: ICD-10-CM

## 2021-12-22 DIAGNOSIS — J02.9 SORE THROAT: ICD-10-CM

## 2021-12-22 LAB
INT CON NEG: NEGATIVE
INT CON POS: POSITIVE
S PYO AG THROAT QL: NEGATIVE

## 2021-12-22 PROCEDURE — 87880 STREP A ASSAY W/OPTIC: CPT | Performed by: PHYSICIAN ASSISTANT

## 2021-12-22 PROCEDURE — 99214 OFFICE O/P EST MOD 30 MIN: CPT | Performed by: PHYSICIAN ASSISTANT

## 2021-12-22 ASSESSMENT — ENCOUNTER SYMPTOMS
NAUSEA: 1
CHANGE IN BOWEL HABIT: 0
SORE THROAT: 1
HEADACHES: 1
COUGH: 0
SWOLLEN GLANDS: 1

## 2021-12-22 NOTE — LETTER
December 22, 2021         Patient: Hannah Perea   YOB: 1995   Date of Visit: 12/22/2021           To Whom it May Concern:    Hannah Perea was seen in my clinic on 12/22/2021.  Please accommodate this patient for the next 2 weeks for work duties not requiring utilization of headset.    If you have any questions or concerns, please don't hesitate to call.        Sincerely,           Hector Farrar P.A.-C.  Electronically Signed

## 2021-12-23 NOTE — PROGRESS NOTES
"Subjective     Hannah Perea is a 26 y.o. female who presents with Ear Fullness (Pressure in both ears. Right ear is worse. Started back in june but was recently seen for the same issue. )            Patient is a 26-year-old female who presents to urgent care with continued bilateral ear fullness, intermittent sensation of fluid in her ears that began on in June this year.  Patient does report that she was previously evaluated for similar symptoms of which at that time she did have work note to include avoidance of headset usage for a week.  She does report that this significantly helped with symptoms.  She however reports that when she utilized her headset again her nausea  returned she will also have associated nausea with the ear fullness.  She does report an intermittent headache however is uncertain if this is related.  Of note she denies noted congestion but does report sore throat that began this morning with associated neck swelling to her lymph nodes.  She denies any fevers shortness of breath or difficulty breathing.  Previously she was prescribed Flonase along with \"a Z-Madhu \"of which this did not make a difference to her ear fullness.  She denies previous sinus issues in the past.    Ear Fullness  This is a new problem. The current episode started more than 1 month ago. The problem occurs intermittently. The problem has been waxing and waning. Associated symptoms include headaches, nausea, a sore throat and swollen glands. Pertinent negatives include no change in bowel habit, congestion, coughing or rash. Nothing aggravates the symptoms. Treatments tried: As above.       Review of Systems   Constitutional: Positive for malaise/fatigue.   HENT: Positive for hearing loss and sore throat. Negative for congestion, ear discharge and ear pain.         Pos. Ear fullness.      Respiratory: Negative for cough.    Gastrointestinal: Positive for nausea. Negative for change in bowel habit.   Skin: " "Negative for rash.   Neurological: Positive for headaches.   All other systems reviewed and are negative.             Objective     /70 (BP Location: Left arm, Patient Position: Sitting, BP Cuff Size: Adult)   Pulse (!) 101   Temp 37.5 °C (99.5 °F) (Temporal)   Resp 12   Ht 1.651 m (5' 5\")   Wt 58.1 kg (128 lb)   SpO2 98%   BMI 21.30 kg/m²    PMH:  has a past medical history of Anxiety.  MEDS: Reviewed .   ALLERGIES: No Known Allergies  SURGHX: No past surgical history on file.  SOCHX:  reports that she has never smoked. She has never used smokeless tobacco. She reports that she does not drink alcohol and does not use drugs.  FH: Family history was reviewed, no pertinent findings to report    Physical Exam  Vitals reviewed.   Constitutional:       General: She is not in acute distress.     Appearance: She is well-developed.   HENT:      Head: Normocephalic and atraumatic.      Right Ear: External ear normal.      Left Ear: External ear normal.      Ears:      Comments: Bilateral clear middle ear effusions     Mouth/Throat:      Pharynx: Posterior oropharyngeal erythema present.   Eyes:      Conjunctiva/sclera: Conjunctivae normal.      Pupils: Pupils are equal, round, and reactive to light.   Neck:      Trachea: No tracheal deviation.   Cardiovascular:      Rate and Rhythm: Normal rate.   Pulmonary:      Effort: Pulmonary effort is normal.   Musculoskeletal:         General: Normal range of motion.      Cervical back: Normal range of motion and neck supple.   Lymphadenopathy:      Cervical: Cervical adenopathy present.   Skin:     General: Skin is warm.      Findings: No rash.      Comments: No rash to area exposed during the visit today.    Neurological:      Mental Status: She is alert and oriented to person, place, and time.      Coordination: Coordination normal.   Psychiatric:         Behavior: Behavior normal.         Thought Content: Thought content normal.         Judgment: Judgment normal. "                             Assessment & Plan        1. Sensation of fullness in both ears  - POCT Rapid Strep A  - Referral to ENT    2. Sore throat  - POCT Rapid Strep A  - Referral to ENT    3. Adenopathy  - POCT Rapid Strep A  - Referral to ENT              Strep in clinic-negative.  Patient continues with eustachian tube dysfunction along with clear middle ear effusions-she has failed over-the-counter nasal steroid-it does not appear that patient is with secondary bacterial infection no indication for antibiotics at this time.  Will make referral at this time.   Work note provided.  Appropriate PPE worn at all times by provider.   Pt. Had face mask on throughout entirety of the visit other than oropharyngeal examination today.     Side effects of OTC or prescribed medications discussed.     DDX, Supportive care, and indications for immediate follow-up discussed with patient.    Instructed to return to clinic or nearest emergency department if we are not available for any change in condition, further concerns, or worsening of symptoms.    The patient and/or guardian demonstrated a good understanding and agreed with the treatment plan.    Please note that this dictation was created using voice recognition software. I have made every reasonable attempt to correct obvious errors, but I expect that there are errors of grammar and possibly content that I did not discover before finalizing the note.

## 2022-07-21 ENCOUNTER — PRE-ADMISSION TESTING (OUTPATIENT)
Dept: ADMISSIONS | Facility: MEDICAL CENTER | Age: 27
End: 2022-07-21
Attending: SURGERY
Payer: COMMERCIAL

## 2022-07-25 ENCOUNTER — PRE-ADMISSION TESTING (OUTPATIENT)
Dept: ADMISSIONS | Facility: MEDICAL CENTER | Age: 27
End: 2022-07-25
Attending: SURGERY
Payer: COMMERCIAL

## 2022-07-25 DIAGNOSIS — Z01.812 PRE-OPERATIVE LABORATORY EXAMINATION: ICD-10-CM

## 2022-07-25 LAB
ALBUMIN SERPL BCP-MCNC: 4.3 G/DL (ref 3.2–4.9)
ALBUMIN/GLOB SERPL: 1.4 G/DL
ALP SERPL-CCNC: 59 U/L (ref 30–99)
ALT SERPL-CCNC: 13 U/L (ref 2–50)
ANION GAP SERPL CALC-SCNC: 11 MMOL/L (ref 7–16)
AST SERPL-CCNC: 13 U/L (ref 12–45)
BASOPHILS # BLD AUTO: 1.1 % (ref 0–1.8)
BASOPHILS # BLD: 0.06 K/UL (ref 0–0.12)
BILIRUB SERPL-MCNC: 0.5 MG/DL (ref 0.1–1.5)
BUN SERPL-MCNC: 16 MG/DL (ref 8–22)
CALCIUM SERPL-MCNC: 8.7 MG/DL (ref 8.5–10.5)
CHLORIDE SERPL-SCNC: 105 MMOL/L (ref 96–112)
CO2 SERPL-SCNC: 23 MMOL/L (ref 20–33)
CREAT SERPL-MCNC: 0.67 MG/DL (ref 0.5–1.4)
EOSINOPHIL # BLD AUTO: 0.17 K/UL (ref 0–0.51)
EOSINOPHIL NFR BLD: 3.1 % (ref 0–6.9)
ERYTHROCYTE [DISTWIDTH] IN BLOOD BY AUTOMATED COUNT: 42.4 FL (ref 35.9–50)
GFR SERPLBLD CREATININE-BSD FMLA CKD-EPI: 122 ML/MIN/1.73 M 2
GLOBULIN SER CALC-MCNC: 3 G/DL (ref 1.9–3.5)
GLUCOSE SERPL-MCNC: 92 MG/DL (ref 65–99)
HCG SERPL QL: NEGATIVE
HCT VFR BLD AUTO: 40.2 % (ref 37–47)
HGB BLD-MCNC: 13.3 G/DL (ref 12–16)
IMM GRANULOCYTES # BLD AUTO: 0.02 K/UL (ref 0–0.11)
IMM GRANULOCYTES NFR BLD AUTO: 0.4 % (ref 0–0.9)
LYMPHOCYTES # BLD AUTO: 1.38 K/UL (ref 1–4.8)
LYMPHOCYTES NFR BLD: 25.1 % (ref 22–41)
MCH RBC QN AUTO: 30.2 PG (ref 27–33)
MCHC RBC AUTO-ENTMCNC: 33.1 G/DL (ref 33.6–35)
MCV RBC AUTO: 91.2 FL (ref 81.4–97.8)
MONOCYTES # BLD AUTO: 0.4 K/UL (ref 0–0.85)
MONOCYTES NFR BLD AUTO: 7.3 % (ref 0–13.4)
NEUTROPHILS # BLD AUTO: 3.47 K/UL (ref 2–7.15)
NEUTROPHILS NFR BLD: 63 % (ref 44–72)
NRBC # BLD AUTO: 0 K/UL
NRBC BLD-RTO: 0 /100 WBC
PLATELET # BLD AUTO: 288 K/UL (ref 164–446)
PMV BLD AUTO: 10.4 FL (ref 9–12.9)
POTASSIUM SERPL-SCNC: 4 MMOL/L (ref 3.6–5.5)
PROT SERPL-MCNC: 7.3 G/DL (ref 6–8.2)
RBC # BLD AUTO: 4.41 M/UL (ref 4.2–5.4)
SODIUM SERPL-SCNC: 139 MMOL/L (ref 135–145)
WBC # BLD AUTO: 5.5 K/UL (ref 4.8–10.8)

## 2022-07-25 PROCEDURE — 85025 COMPLETE CBC W/AUTO DIFF WBC: CPT

## 2022-07-25 PROCEDURE — 84703 CHORIONIC GONADOTROPIN ASSAY: CPT

## 2022-07-25 PROCEDURE — 36415 COLL VENOUS BLD VENIPUNCTURE: CPT

## 2022-07-25 PROCEDURE — 80053 COMPREHEN METABOLIC PANEL: CPT

## 2022-07-28 ENCOUNTER — HOSPITAL ENCOUNTER (OUTPATIENT)
Facility: MEDICAL CENTER | Age: 27
End: 2022-07-28
Attending: SURGERY | Admitting: SURGERY
Payer: COMMERCIAL

## 2022-07-28 ENCOUNTER — APPOINTMENT (OUTPATIENT)
Dept: RADIOLOGY | Facility: MEDICAL CENTER | Age: 27
End: 2022-07-28
Attending: SURGERY
Payer: COMMERCIAL

## 2022-07-28 ENCOUNTER — ANESTHESIA (OUTPATIENT)
Dept: SURGERY | Facility: MEDICAL CENTER | Age: 27
End: 2022-07-28
Payer: COMMERCIAL

## 2022-07-28 ENCOUNTER — ANESTHESIA EVENT (OUTPATIENT)
Dept: SURGERY | Facility: MEDICAL CENTER | Age: 27
End: 2022-07-28
Payer: COMMERCIAL

## 2022-07-28 VITALS
DIASTOLIC BLOOD PRESSURE: 66 MMHG | OXYGEN SATURATION: 97 % | RESPIRATION RATE: 18 BRPM | BODY MASS INDEX: 20.76 KG/M2 | WEIGHT: 129.19 LBS | HEIGHT: 66 IN | SYSTOLIC BLOOD PRESSURE: 102 MMHG | TEMPERATURE: 97.4 F | HEART RATE: 74 BPM

## 2022-07-28 DIAGNOSIS — C43.61 MALIGNANT MELANOMA OF RIGHT UPPER EXTREMITY INCLUDING SHOULDER (HCC): ICD-10-CM

## 2022-07-28 LAB
HCG UR QL: NEGATIVE
PATHOLOGY CONSULT NOTE: NORMAL

## 2022-07-28 PROCEDURE — 700105 HCHG RX REV CODE 258: Performed by: SURGERY

## 2022-07-28 PROCEDURE — 700111 HCHG RX REV CODE 636 W/ 250 OVERRIDE (IP): Performed by: ANESTHESIOLOGY

## 2022-07-28 PROCEDURE — 160046 HCHG PACU - 1ST 60 MINS PHASE II: Performed by: SURGERY

## 2022-07-28 PROCEDURE — 88305 TISSUE EXAM BY PATHOLOGIST: CPT

## 2022-07-28 PROCEDURE — 160002 HCHG RECOVERY MINUTES (STAT): Performed by: SURGERY

## 2022-07-28 PROCEDURE — 160029 HCHG SURGERY MINUTES - 1ST 30 MINS LEVEL 4: Performed by: SURGERY

## 2022-07-28 PROCEDURE — 81025 URINE PREGNANCY TEST: CPT

## 2022-07-28 PROCEDURE — 700101 HCHG RX REV CODE 250: Performed by: SURGERY

## 2022-07-28 PROCEDURE — 160035 HCHG PACU - 1ST 60 MINS PHASE I: Performed by: SURGERY

## 2022-07-28 PROCEDURE — 88307 TISSUE EXAM BY PATHOLOGIST: CPT

## 2022-07-28 PROCEDURE — 160009 HCHG ANES TIME/MIN: Performed by: SURGERY

## 2022-07-28 PROCEDURE — 160048 HCHG OR STATISTICAL LEVEL 1-5: Performed by: SURGERY

## 2022-07-28 PROCEDURE — 160041 HCHG SURGERY MINUTES - EA ADDL 1 MIN LEVEL 4: Performed by: SURGERY

## 2022-07-28 PROCEDURE — 160025 RECOVERY II MINUTES (STATS): Performed by: SURGERY

## 2022-07-28 PROCEDURE — 38792 RA TRACER ID OF SENTINL NODE: CPT | Mod: RT

## 2022-07-28 PROCEDURE — 00400 ANES INTEGUMENTARY SYS NOS: CPT | Performed by: ANESTHESIOLOGY

## 2022-07-28 PROCEDURE — 160047 HCHG PACU  - EA ADDL 30 MINS PHASE II: Performed by: SURGERY

## 2022-07-28 PROCEDURE — 88342 IMHCHEM/IMCYTCHM 1ST ANTB: CPT

## 2022-07-28 PROCEDURE — 88341 IMHCHEM/IMCYTCHM EA ADD ANTB: CPT

## 2022-07-28 RX ORDER — HALOPERIDOL 5 MG/ML
1 INJECTION INTRAMUSCULAR
Status: DISCONTINUED | OUTPATIENT
Start: 2022-07-28 | End: 2022-07-28 | Stop reason: HOSPADM

## 2022-07-28 RX ORDER — ALBUTEROL SULFATE 2.5 MG/3ML
2.5 SOLUTION RESPIRATORY (INHALATION)
Status: DISCONTINUED | OUTPATIENT
Start: 2022-07-28 | End: 2022-07-28 | Stop reason: HOSPADM

## 2022-07-28 RX ORDER — HYDROMORPHONE HYDROCHLORIDE 1 MG/ML
0.1 INJECTION, SOLUTION INTRAMUSCULAR; INTRAVENOUS; SUBCUTANEOUS
Status: DISCONTINUED | OUTPATIENT
Start: 2022-07-28 | End: 2022-07-28 | Stop reason: HOSPADM

## 2022-07-28 RX ORDER — SODIUM CHLORIDE, SODIUM LACTATE, POTASSIUM CHLORIDE, CALCIUM CHLORIDE 600; 310; 30; 20 MG/100ML; MG/100ML; MG/100ML; MG/100ML
INJECTION, SOLUTION INTRAVENOUS CONTINUOUS
Status: ACTIVE | OUTPATIENT
Start: 2022-07-28 | End: 2022-07-28

## 2022-07-28 RX ORDER — SODIUM CHLORIDE, SODIUM LACTATE, POTASSIUM CHLORIDE, CALCIUM CHLORIDE 600; 310; 30; 20 MG/100ML; MG/100ML; MG/100ML; MG/100ML
INJECTION, SOLUTION INTRAVENOUS CONTINUOUS
Status: CANCELLED | OUTPATIENT
Start: 2022-07-28 | End: 2022-07-28

## 2022-07-28 RX ORDER — MIDAZOLAM HYDROCHLORIDE 1 MG/ML
INJECTION INTRAMUSCULAR; INTRAVENOUS PRN
Status: DISCONTINUED | OUTPATIENT
Start: 2022-07-28 | End: 2022-07-28 | Stop reason: SURG

## 2022-07-28 RX ORDER — MEPERIDINE HYDROCHLORIDE 25 MG/ML
12.5 INJECTION INTRAMUSCULAR; INTRAVENOUS; SUBCUTANEOUS
Status: DISCONTINUED | OUTPATIENT
Start: 2022-07-28 | End: 2022-07-28 | Stop reason: HOSPADM

## 2022-07-28 RX ORDER — DIPHENHYDRAMINE HYDROCHLORIDE 50 MG/ML
12.5 INJECTION INTRAMUSCULAR; INTRAVENOUS
Status: DISCONTINUED | OUTPATIENT
Start: 2022-07-28 | End: 2022-07-28 | Stop reason: HOSPADM

## 2022-07-28 RX ORDER — DEXAMETHASONE SODIUM PHOSPHATE 4 MG/ML
INJECTION, SOLUTION INTRA-ARTICULAR; INTRALESIONAL; INTRAMUSCULAR; INTRAVENOUS; SOFT TISSUE PRN
Status: DISCONTINUED | OUTPATIENT
Start: 2022-07-28 | End: 2022-07-28 | Stop reason: SURG

## 2022-07-28 RX ORDER — LABETALOL HYDROCHLORIDE 5 MG/ML
5 INJECTION, SOLUTION INTRAVENOUS
Status: DISCONTINUED | OUTPATIENT
Start: 2022-07-28 | End: 2022-07-28 | Stop reason: HOSPADM

## 2022-07-28 RX ORDER — CEFAZOLIN SODIUM 1 G/3ML
INJECTION, POWDER, FOR SOLUTION INTRAMUSCULAR; INTRAVENOUS PRN
Status: DISCONTINUED | OUTPATIENT
Start: 2022-07-28 | End: 2022-07-28 | Stop reason: SURG

## 2022-07-28 RX ORDER — HYDROCODONE BITARTRATE AND ACETAMINOPHEN 5; 325 MG/1; MG/1
1 TABLET ORAL EVERY 4 HOURS PRN
Qty: 15 TABLET | Refills: 0 | Status: SHIPPED | OUTPATIENT
Start: 2022-07-28 | End: 2022-07-31

## 2022-07-28 RX ORDER — HYDROMORPHONE HYDROCHLORIDE 1 MG/ML
0.4 INJECTION, SOLUTION INTRAMUSCULAR; INTRAVENOUS; SUBCUTANEOUS
Status: DISCONTINUED | OUTPATIENT
Start: 2022-07-28 | End: 2022-07-28 | Stop reason: HOSPADM

## 2022-07-28 RX ORDER — OXYCODONE HYDROCHLORIDE AND ACETAMINOPHEN 5; 325 MG/1; MG/1
1 TABLET ORAL
Status: DISCONTINUED | OUTPATIENT
Start: 2022-07-28 | End: 2022-07-28 | Stop reason: HOSPADM

## 2022-07-28 RX ORDER — BUPIVACAINE HYDROCHLORIDE AND EPINEPHRINE 5; 5 MG/ML; UG/ML
INJECTION, SOLUTION EPIDURAL; INTRACAUDAL; PERINEURAL
Status: DISCONTINUED | OUTPATIENT
Start: 2022-07-28 | End: 2022-07-28 | Stop reason: HOSPADM

## 2022-07-28 RX ORDER — SODIUM CHLORIDE, SODIUM LACTATE, POTASSIUM CHLORIDE, CALCIUM CHLORIDE 600; 310; 30; 20 MG/100ML; MG/100ML; MG/100ML; MG/100ML
INJECTION, SOLUTION INTRAVENOUS CONTINUOUS
Status: DISCONTINUED | OUTPATIENT
Start: 2022-07-28 | End: 2022-07-28 | Stop reason: HOSPADM

## 2022-07-28 RX ORDER — ONDANSETRON 2 MG/ML
INJECTION INTRAMUSCULAR; INTRAVENOUS PRN
Status: DISCONTINUED | OUTPATIENT
Start: 2022-07-28 | End: 2022-07-28 | Stop reason: SURG

## 2022-07-28 RX ORDER — BUPIVACAINE HYDROCHLORIDE AND EPINEPHRINE 5; 5 MG/ML; UG/ML
INJECTION, SOLUTION EPIDURAL; INTRACAUDAL; PERINEURAL
Status: DISCONTINUED
Start: 2022-07-28 | End: 2022-07-28 | Stop reason: HOSPADM

## 2022-07-28 RX ORDER — HYDROMORPHONE HYDROCHLORIDE 1 MG/ML
0.2 INJECTION, SOLUTION INTRAMUSCULAR; INTRAVENOUS; SUBCUTANEOUS
Status: DISCONTINUED | OUTPATIENT
Start: 2022-07-28 | End: 2022-07-28 | Stop reason: HOSPADM

## 2022-07-28 RX ORDER — METOPROLOL TARTRATE 1 MG/ML
1 INJECTION, SOLUTION INTRAVENOUS
Status: DISCONTINUED | OUTPATIENT
Start: 2022-07-28 | End: 2022-07-28 | Stop reason: HOSPADM

## 2022-07-28 RX ORDER — OXYCODONE HYDROCHLORIDE AND ACETAMINOPHEN 5; 325 MG/1; MG/1
2 TABLET ORAL
Status: DISCONTINUED | OUTPATIENT
Start: 2022-07-28 | End: 2022-07-28 | Stop reason: HOSPADM

## 2022-07-28 RX ADMIN — FENTANYL CITRATE 50 MCG: 50 INJECTION, SOLUTION INTRAMUSCULAR; INTRAVENOUS at 13:07

## 2022-07-28 RX ADMIN — HALOPERIDOL LACTATE 1 MG: 5 INJECTION, SOLUTION INTRAMUSCULAR at 14:43

## 2022-07-28 RX ADMIN — SODIUM CHLORIDE, POTASSIUM CHLORIDE, SODIUM LACTATE AND CALCIUM CHLORIDE: 600; 310; 30; 20 INJECTION, SOLUTION INTRAVENOUS at 10:55

## 2022-07-28 RX ADMIN — SODIUM CHLORIDE, POTASSIUM CHLORIDE, SODIUM LACTATE AND CALCIUM CHLORIDE: 600; 310; 30; 20 INJECTION, SOLUTION INTRAVENOUS at 14:00

## 2022-07-28 RX ADMIN — CEFAZOLIN 2 G: 330 INJECTION, POWDER, FOR SOLUTION INTRAMUSCULAR; INTRAVENOUS at 12:52

## 2022-07-28 RX ADMIN — PROPOFOL 150 MG: 10 INJECTION, EMULSION INTRAVENOUS at 12:59

## 2022-07-28 RX ADMIN — FENTANYL CITRATE 25 MCG: 50 INJECTION, SOLUTION INTRAMUSCULAR; INTRAVENOUS at 13:54

## 2022-07-28 RX ADMIN — ONDANSETRON 4 MG: 2 INJECTION INTRAMUSCULAR; INTRAVENOUS at 14:06

## 2022-07-28 RX ADMIN — DEXAMETHASONE SODIUM PHOSPHATE 8 MG: 4 INJECTION, SOLUTION INTRA-ARTICULAR; INTRALESIONAL; INTRAMUSCULAR; INTRAVENOUS; SOFT TISSUE at 12:52

## 2022-07-28 RX ADMIN — FENTANYL CITRATE 100 MCG: 50 INJECTION, SOLUTION INTRAMUSCULAR; INTRAVENOUS at 12:59

## 2022-07-28 RX ADMIN — MIDAZOLAM HYDROCHLORIDE 2 MG: 1 INJECTION, SOLUTION INTRAMUSCULAR; INTRAVENOUS at 12:48

## 2022-07-28 RX ADMIN — FENTANYL CITRATE 25 MCG: 50 INJECTION, SOLUTION INTRAMUSCULAR; INTRAVENOUS at 14:44

## 2022-07-28 ASSESSMENT — PAIN DESCRIPTION - PAIN TYPE
TYPE: SURGICAL PAIN
TYPE: SURGICAL PAIN

## 2022-07-28 ASSESSMENT — FIBROSIS 4 INDEX: FIB4 SCORE: 0.34

## 2022-07-28 ASSESSMENT — PAIN SCALES - GENERAL: PAIN_LEVEL: 0

## 2022-07-28 NOTE — ANESTHESIA POSTPROCEDURE EVALUATION
Patient: Hannah Perea    Procedure Summary     Date: 07/28/22 Room / Location: [facility] ROOM 27 / SURGERY SAME DAY [facility]    Anesthesia Start: 1248 Anesthesia Stop: 1418    Procedures:       RADICAL WIDE EXCISION OF MALIGNANT MELANOMA OF RIGHT UPPER ARM; RIGHT AXILLARY SENTINEL NODE BIOPSY (Right Arm Upper)      BIOPSY, LYMPH NODE, SENTINEL (Right Axilla) Diagnosis: (MALIGNANT MELANOMA OF SKIN OF UPPER LIMB)    Surgeons: George Bess M.D. Responsible Provider: Brant Campbell D.O.    Anesthesia Type: general ASA Status: 1          Final Anesthesia Type: general  Last vitals  BP   Blood Pressure: 110/66    Temp   36.1 °C (96.9 °F)    Pulse   75   Resp   18    SpO2   98 %      Anesthesia Post Evaluation    Patient location during evaluation: PACU  Patient participation: complete - patient participated  Level of consciousness: awake and alert  Pain score: 0    Airway patency: patent  Anesthetic complications: no  Cardiovascular status: hemodynamically stable  Respiratory status: acceptable  Hydration status: euvolemic    PONV: none          No complications documented.

## 2022-07-28 NOTE — ANESTHESIA PREPROCEDURE EVALUATION
Case: [id number] Date/Time: 07/28/22 1245    Procedures:       RADICAL WIDE EXCISION OF MALIGNANT MELANOMA OF RIGHT UPPER ARM; RIGHT AXILLARY SENTINEL NODE BIOPSY      BIOPSY, LYMPH NODE, SENTINEL    Pre-op diagnosis: MALIGNANT MELANOMA OF SKIN OF UPPER LIMB    Location: [facility] ROOM 27 / SURGERY SAME DAY [facility]    Surgeons: George Bess M.D.          Relevant Problems   No relevant active problems       Physical Exam    Airway   Mallampati: II  TM distance: >3 FB  Neck ROM: full       Cardiovascular - normal exam  Rhythm: regular  Rate: normal  (-) murmur     Dental - normal exam           Pulmonary - normal exam  Breath sounds clear to auscultation     Abdominal    Neurological - normal exam                 Anesthesia Plan    ASA 1       Plan - general       Airway plan will be LMA          Induction: intravenous    Postoperative Plan: Postoperative administration of opioids is intended.    Pertinent diagnostic labs and testing reviewed    Informed Consent:    Anesthetic plan and risks discussed with patient.    Use of blood products discussed with: patient whom consented to blood products.

## 2022-07-28 NOTE — OR SURGEON
Immediate Post OP Note    PreOp Diagnosis: Malignant Melanoma of Right Upper Lateral Arm over Deltoid      PostOp Diagnosis: same      Procedure(s):  RADICAL WIDE EXCISION OF MALIGNANT MELANOMA OF RIGHT UPPER ARM; RIGHT AXILLARY SENTINEL NODE BIOPSY - Wound Class: Clean  BIOPSY, LYMPH NODE, SENTINEL - Wound Class: Clean    Surgeon(s):  George Bess M.D.    Anesthesiologist/Type of Anesthesia:  Anesthesiologist: Brant Campbell D.O./General    Surgical Staff:  Circulator: Susana Corona R.N.; Franci Ewing R.N.  Scrub Person: Stephanie Card    Specimens removed if any:  ID Type Source Tests Collected by Time Destination   A : right axillary sentinel  lymph node #1 Other Other PATHOLOGY SPECIMEN George Bess M.D. 7/28/2022 1321    B : right axillary sentinel lymph node #2 Other Other PATHOLOGY SPECIMEN George Bess M.D. 7/28/2022 1322    C : MALIGNANT MELANOMA OF THE RIGHT DELTOID SHORT SUPERIOR LONG LATERAL  Other Other PATHOLOGY SPECIMEN George Bess M.D. 7/28/2022 1343        Estimated Blood Loss: minimal    Findings: 1x1cm defect from previous biopsy, 1cm minimal margin resection, 2 sentinel nodes found high in axilla, accessory sweat/breast tissue in axilla noted    Complications: no apparent complications        7/28/2022 2:18 PM George Bess M.D.

## 2022-07-28 NOTE — OR NURSING
1458 Report received from PELON Goldberg.    1501 Patient meets phase 2 criteria.    1503 Patient tolerating oral intake of fluids.    1515 Patient's friend, Roselia borgested via phone call.    1541 Report given to PELON Goldberg.

## 2022-07-28 NOTE — ANESTHESIA TIME REPORT
Anesthesia Start and Stop Event Times     Date Time Event    7/28/2022 1154 Ready for Procedure     1248 Anesthesia Start     1418 Anesthesia Stop        Responsible Staff  07/28/22    Name Role Begin End    Brant Campbell D.O. Anesth 1248 1418        Overtime Reason:  no overtime (within assigned shift)    Comments:

## 2022-07-28 NOTE — ANESTHESIA PROCEDURE NOTES
Airway    Date/Time: 7/28/2022 12:58 PM  Performed by: Brant Campbell D.O.  Authorized by: Brant Campbell D.O.     Location:  OR  Urgency:  Elective  Indications for Airway Management:  Anesthesia      Spontaneous Ventilation: absent    Sedation Level:  Deep  Preoxygenated: Yes    Mask Difficulty Assessment:  1 - vent by mask  Final Airway Type:  Supraglottic airway  Final Supraglottic Airway:  Standard LMA    SGA Size:  4  Number of Attempts at Approach:  1

## 2022-07-28 NOTE — OR NURSING
1413: Patient arrived to [facility] with OR RN and anesthesia. ID confirmed, dressing assessed, VSS, oral airway in place on 6L via mask.   1420: Patient remains asleep, airway in place, VSS.  1428: Patient arousing, oral airway removed, denies pain and nausea. Mask removed; on RA    1445: Patient reporting 4/10 pain and nausea. Medicated per eMAR.  1455: Patient reports improved symptoms.   1458: Report given to PELON Castro.

## 2022-07-28 NOTE — DISCHARGE INSTRUCTIONS
HOME CARE INSTRUCTIONS    ACTIVITY: Rest and take it easy for the first 24 hours.  A responsible adult is recommended to remain with you during that time.  It is normal to feel sleepy.  We encourage you to not do anything that requires balance, judgment or coordination.    FOR 24 HOURS DO NOT:  Drive, operate machinery or run household appliances.  Drink beer or alcoholic beverages.  Make important decisions or sign legal documents.    SPECIAL INSTRUCTIONS:   Remove Coban  wrap on Saturday.   Remove plastic and gauze on Tuesday   Leave underlying steristips on until they fall off   Patient may shower on Saturday   Try not to use right arm over your head. Keep at side for 2 weeks    DIET: To avoid nausea, slowly advance diet as tolerated, avoiding spicy or greasy foods for the first day.  Add more substantial food to your diet according to your physician's instructions.  Babies can be fed formula or breast milk as soon as they are hungry.  INCREASE FLUIDS AND FIBER TO AVOID CONSTIPATION.    SURGICAL DRESSING/BATHING: see above instructions    MEDICATIONS: Resume taking daily medication.  Take prescribed pain medication with food.  If no medication is prescribed, you may take non-aspirin pain medication if needed.  PAIN MEDICATION CAN BE VERY CONSTIPATING.  Take a stool softener or laxative such as senokot, pericolace, or milk of magnesia if needed.    Prescription given for Hydrocodone-Acetaminophen (Norco).  Last pain medication given at ____________. Next dose available at _____________.    A follow-up appointment should be arranged with your doctor; call to schedule.    You should CALL YOUR PHYSICIAN if you develop:  Fever greater than 101 degrees F.  Pain not relieved by medication, or persistent nausea or vomiting.  Excessive bleeding (blood soaking through dressing) or unexpected drainage from the wound.  Extreme redness or swelling around the incision site, drainage of pus or foul smelling drainage.  Inability  to urinate or empty your bladder within 8 hours.  Problems with breathing or chest pain.    You should call 911 if you develop problems with breathing or chest pain.  If you are unable to contact your doctor or surgical center, you should go to the nearest emergency room or urgent care center.  Physician's telephone #: Dr. Bess [phone]    MILD FLU-LIKE SYMPTOMS ARE NORMAL.  YOU MAY EXPERIENCE GENERALIZED MUSCLE ACHES, THROAT IRRITATION, HEADACHE AND/OR SOME NAUSEA.    If any questions arise, call your doctor.  If your doctor is not available, please feel free to call the Surgical Center at [phone].  The Center is open Monday through Friday from 7AM to 7PM.      A registered nurse may call you a few days after your surgery to see how you are doing after your procedure.    You may also receive a survey in the mail within the next two weeks and we ask that you take a few moments to complete the survey and return it to us.  Our goal is to provide you with very good care and we value your comments.     Depression / Suicide Risk    As you are discharged from this [facility] facility, it is important to learn how to keep safe from harming yourself.    Recognize the warning signs:  Abrupt changes in personality, positive or negative- including increase in energy   Giving away possessions  Change in eating patterns- significant weight changes-  positive or negative  Change in sleeping patterns- unable to sleep or sleeping all the time   Unwillingness or inability to communicate  Depression  Unusual sadness, discouragement and loneliness  Talk of wanting to die  Neglect of personal appearance   Rebelliousness- reckless behavior  Withdrawal from people/activities they love  Confusion- inability to concentrate     If you or a loved one observes any of these behaviors or has concerns about self-harm, here's what you can do:  Talk about it- your feelings and reasons for harming yourself  Remove any means that you  might use to hurt yourself (examples: pills, rope, extension cords, firearm)  Get professional help from the community (Mental Health, Substance Abuse, psychological counseling)  Do not be alone:Call your Safe Contact- someone whom you trust who will be there for you.  Call your local CRISIS HOTLINE 666-7777 or 877-830-5800  Call your local Children's Mobile Crisis Response Team [place] (680) 753-3681 or www.iPointer  Call the toll free National Suicide Prevention Hotlines   National Suicide Prevention Lifeline 426-918-PZLZ (8398)  [facility] Line Network 800-SUICIDE (010-9594)    I acknowledge receipt and understanding of these Home Care instructions.

## 2022-07-28 NOTE — OR NURSING
1540: Assumed care of patient; received updates from PELON Castro.   1545: Patient resting comfortably; denies pain or nausea.  1555: Discharge instructions reviewed with patient's friend and patient at bedside. Understanding confirmed, questions answered. Copy of instructions given to patient's friend.  1605: Patient ready to go home.   1615: Patient dressed and IV discontinued.  1617: Patient discharged from PACU.

## 2022-07-29 NOTE — OP REPORT
DATE OF SERVICE:  07/28/2022     SURGEON:  George Bess MD     ANESTHESIOLOGIST:  Brant Campbell DO     TYPE OF ANESTHETIC:  General anesthetic using laryngeal mask airway plus local   0.5% Marcaine with epinephrine.     PREOPERATIVE DIAGNOSIS:  Malignant melanoma of the right upper arm in the   deltoid area.     POSTOPERATIVE DIAGNOSIS:  Malignant melanoma of the right upper arm in the   deltoid area.     PROCEDURES:  1.  Radical wide excision of malignant melanoma in the right lateral upper arm   with layered closure.  2.  Right axillary sentinel node biopsy and mapping.     FINDINGS:  The patient is a 27-year-old female who noticed a black skin lesion   on the lateral part of her upper arm near the deltoid muscle about 3 months   ago.  This began to change and enlarged and raised up.  She denies any   ulceration or bleeding.  She went to a dermatologist and a biopsy was   performed that revealed a superficial spreading malignant melanoma.  The   Breslow depth was 1 mm and there is no ulceration.  Mitotic rate was 4 per   square mm.  NCCN guidelines were reviewed and options were discussed and she   elected to proceed with a radical wide excision of the melanoma with a minimum   of 1 cm margin around the primary melanoma site along with sentinel node   biopsy.  This was performed today with a radical wide excision around the   melanoma.  This melanoma had a 1 cm margin on the superior and inferior aspect   and a 3-4 cm margin on the medial and lateral aspect.  Two sentinel lymph   nodes were identified relatively high in the axilla.  She did have some   abnormal accessory breast tissue or  tissue in the axilla that was   quite prominent and these lymph nodes were quite high in the axilla medial to   this tissue.  Both the nodes were removed and sent to pathology.  There were   no apparent complications with the surgery.     FINDING AND PROCEDURE:  The patient was brought to the operating room and    placed on the table in supine position.  General anesthetic was then provided   by Dr. Campbell, the anesthesiologist.  The entire right upper extremity and   axillary area were prepped and draped in sterile fashion.  A Neoprobe was used   to identify a radioactive hot spot in the right axilla.  The patient was   noted to have accessory breast tissue behind the hair-bearing portion of the   right axilla, the hot spot was just medial to this area.  A 4 cm incision was   made just below the hair-bearing portion of the axilla with #15 blade through   the skin and dermis.  All bleeding was controlled with electrocautery.    Dissection was then carried down through the adipose tissue until the   clavipectoral fascia was encountered.  This was incised, allowing access to   the axilla.  The Neoprobe was used to identify 2 radioactive lymph nodes.    These nodes were quite superior and behind the pectoralis major muscle.  They   were carefully dissected out with LigaSure device and then sent to pathology   for further characterization.  Further interrogation of the axilla did not   reveal any other areas of radioactivity.  The wound was irrigated with   approximately 100 mL of warm normal saline.  The clavipectoral fascia and the   dermis were each closed using running 3-0 Vicryl suture.  The skin was closed   using running 4-0 Monocryl suture in subcuticular fashion.  Steri-Strips and   sterile dressings were applied.  Next, the arm was rotated to expose the   melanoma on the lateral aspect of the right upper arm.  This area had already   been prepped.  A ruler was used to linette a 1 cm margin around the lateral most   aspect of the previous biopsy site.  An elliptical incision was designed   heading towards the axilla around this lesion to incorporate a 1 cm minimal   margin.  The elliptical portion was extended about 4 cm superiorly and 4 cm   inferiorly.  Incision was made in the skin and dermis with #15 blade.  All    bleeding was controlled with electrocautery.  Dissection was then carried down   with cautery device until the fascia of the deltoid muscle was encountered.    All superficial veins of this area were controlled with the LigaSure device.    Entire specimen to include the fascia of the deltoid muscle, the overlying   subcutaneous tissue, dermis and the tumor was removed as an en bloc specimen,   it was oriented with sutures for the pathologist.  It was sent to pathology   for further characterization.  Remaining tissue was elevated off the deltoid   fascia circumferentially with electrocautery device about 2-3 cm in all   directions.  This provided enough laxity for the wound flaps to be rotated in   to fill the defect.  The wound was irrigated with saline and then injected   with 0.5% Marcaine with epinephrine.  The wound was closed using interrupted   sutures of 3-0 Vicryl suture for the subcutaneous tissue and 3-0 nylon sutures   in a fashion for the skin.  Steri-Strips and a sterile dressing applied.    Kerlix and a Coban wrap was also applied.  The arm was placed into a sling.    The patient did tolerate the procedure well with no complication.  Final   sponge and needle count were correct.    Wide Local Excision for Primary Cutaneous Melanoma  Operation performed with curative intent Yes   Original Breslow thickness of the lesion 1.0 mm (to the tenth of a millimeter)   Clinical margin width 1 cm   Depth of excision Full-thickness skin/subcutaneous tissue down to fascia (melanoma)       ______________________________  ROSALBA PARISH MD    [facility]    DD:  07/28/2022 19:24  DT:  07/28/2022 20:17    Job#:  [id number]    CC:KODI GIBSON MD

## 2023-06-09 ENCOUNTER — OFFICE VISIT (OUTPATIENT)
Dept: URGENT CARE | Facility: CLINIC | Age: 28
End: 2023-06-09
Payer: COMMERCIAL

## 2023-06-09 VITALS
BODY MASS INDEX: 21.66 KG/M2 | HEIGHT: 65 IN | WEIGHT: 130 LBS | TEMPERATURE: 97.7 F | SYSTOLIC BLOOD PRESSURE: 106 MMHG | RESPIRATION RATE: 20 BRPM | HEART RATE: 73 BPM | DIASTOLIC BLOOD PRESSURE: 68 MMHG | OXYGEN SATURATION: 98 %

## 2023-06-09 DIAGNOSIS — R42 LIGHT HEADED: ICD-10-CM

## 2023-06-09 DIAGNOSIS — R11.0 NAUSEA: ICD-10-CM

## 2023-06-09 PROCEDURE — 3078F DIAST BP <80 MM HG: CPT | Performed by: REGISTERED NURSE

## 2023-06-09 PROCEDURE — 99214 OFFICE O/P EST MOD 30 MIN: CPT | Performed by: REGISTERED NURSE

## 2023-06-09 PROCEDURE — 93000 ELECTROCARDIOGRAM COMPLETE: CPT | Performed by: REGISTERED NURSE

## 2023-06-09 PROCEDURE — 3074F SYST BP LT 130 MM HG: CPT | Performed by: REGISTERED NURSE

## 2023-06-09 ASSESSMENT — ENCOUNTER SYMPTOMS
WEIGHT LOSS: 0
SHORTNESS OF BREATH: 0
SORE THROAT: 0
SENSORY CHANGE: 0
FEVER: 0
COUGH: 0
FOCAL WEAKNESS: 0
HEADACHES: 0
ABDOMINAL PAIN: 0
FLANK PAIN: 0
MYALGIAS: 0
DIZZINESS: 1
WEAKNESS: 0
HEMOPTYSIS: 0
PALPITATIONS: 1
NAUSEA: 1

## 2023-06-09 ASSESSMENT — FIBROSIS 4 INDEX: FIB4 SCORE: 0.35

## 2023-06-10 NOTE — PROGRESS NOTES
"Subjective:   Hannah Perea is a 28 y.o. female who presents for Nausea (All day, heaviness in chest, lightheaded, and taste of blood )    HPI:  Has had 4 episodes of feeling light headed and a palpitations in her chest, these lasted about 10-15 minutes, resolving spontaneously. Has had some intermittent nausea throughout the day. Also states she has the taste of blood in her month.  Currently asymptomatic.  No underlying medical issues. No daily medications. No recent illness. Immunizations up to date. Denies pregnancy, has an IUD.    Review of Systems   Constitutional:  Negative for fever and weight loss.   HENT:  Negative for nosebleeds and sore throat.    Respiratory:  Negative for cough, hemoptysis and shortness of breath.    Cardiovascular:  Positive for palpitations. Negative for chest pain and leg swelling.   Gastrointestinal:  Positive for nausea. Negative for abdominal pain.   Genitourinary:  Negative for dysuria and flank pain.   Musculoskeletal:  Negative for myalgias.   Skin:  Negative for rash.   Neurological:  Positive for dizziness. Negative for sensory change, focal weakness, weakness and headaches.     Medications, Allergies, and current problem list reviewed today in Epic.     Objective:     /68   Pulse 73   Temp 36.5 °C (97.7 °F) (Temporal)   Resp 20   Ht 1.651 m (5' 5\")   Wt 59 kg (130 lb)   SpO2 98%     Physical Exam  Vitals and nursing note reviewed.   Constitutional:       General: She is not in acute distress.     Appearance: Normal appearance. She is well-developed. She is not ill-appearing, toxic-appearing or diaphoretic.   HENT:      Head: Normocephalic and atraumatic.      Right Ear: Tympanic membrane, ear canal and external ear normal.      Left Ear: Tympanic membrane, ear canal and external ear normal.      Nose: Nose normal. No congestion or rhinorrhea.      Mouth/Throat:      Mouth: Mucous membranes are moist.      Pharynx: Oropharynx is clear.   Eyes: "      General: No scleral icterus.     Conjunctiva/sclera: Conjunctivae normal.   Cardiovascular:      Rate and Rhythm: Normal rate and regular rhythm.      Pulses: Normal pulses.      Heart sounds: Normal heart sounds. No murmur heard.  Pulmonary:      Effort: Pulmonary effort is normal. No respiratory distress.      Breath sounds: Normal breath sounds.   Abdominal:      General: Abdomen is flat.      Palpations: Abdomen is soft.      Tenderness: There is no abdominal tenderness. There is no right CVA tenderness, left CVA tenderness or guarding.   Musculoskeletal:      Cervical back: Normal range of motion and neck supple.      Right lower leg: No edema.      Left lower leg: No edema.   Lymphadenopathy:      Cervical: No cervical adenopathy.   Skin:     General: Skin is warm and dry.      Capillary Refill: Capillary refill takes less than 2 seconds.   Neurological:      General: No focal deficit present.      Mental Status: She is alert and oriented to person, place, and time. Mental status is at baseline.      Cranial Nerves: Cranial nerves 2-12 are intact.      Sensory: Sensation is intact.      Motor: Motor function is intact.      Coordination: Coordination is intact.      Gait: Gait is intact.   Psychiatric:         Mood and Affect: Mood normal.         Behavior: Behavior normal.         Thought Content: Thought content normal.         Judgment: Judgment normal.       EKG sinus rhythm, rate of 68, borderline right axis deviation, no ST elevation, no signs of ischemia    Assessment/Plan:     Diagnosis and associated orders:     1. Light headed  EKG - Clinic Performed    CBC WITH DIFFERENTIAL    Comp Metabolic Panel      2. Nausea  Comp Metabolic Panel           Comments/MDM:     Vital signs within normal limits, EKG is sinus rhythm with no signs of ischemia  Currently asymptomatic, but does describe self resolving symptoms lasting 10 to 15 minutes  We will check basic blood plan will including CBC and CMP  We  will also place referral to primary care  Patient given strict ER precautions  Monitor symptoms  Follow up with primary care provider          Differential diagnosis, natural history, supportive care, and indications for immediate follow-up discussed.    Return to clinic or go to ED if symptoms worsen or persist. Indications for ED discussed at length. Patient/Parent/Guardian voices understanding. Follow-up with your primary care provider in 3-5 days. Red flag symptoms discussed. All side effects of medication discussed including allergic response, GI upset, tendon injury, rash, sedation etc.    I personally reviewed prior external notes and test results pertinent to today's visit as well as additional imaging and testing completed in clinic today.     Please note that this dictation was created using voice recognition software. I have made every reasonable attempt to correct obvious errors, but I expect that there are errors of grammar and possibly content that I did not discover before finalizing the note.    This note was electronically signed by AMY Ortiz

## 2023-06-12 ENCOUNTER — OFFICE VISIT (OUTPATIENT)
Dept: MEDICAL GROUP | Facility: MEDICAL CENTER | Age: 28
End: 2023-06-12
Attending: REGISTERED NURSE
Payer: COMMERCIAL

## 2023-06-12 VITALS
OXYGEN SATURATION: 98 % | HEART RATE: 78 BPM | BODY MASS INDEX: 22.04 KG/M2 | HEIGHT: 65 IN | RESPIRATION RATE: 16 BRPM | DIASTOLIC BLOOD PRESSURE: 60 MMHG | WEIGHT: 132.28 LBS | SYSTOLIC BLOOD PRESSURE: 108 MMHG | TEMPERATURE: 97.9 F

## 2023-06-12 DIAGNOSIS — C43.61 MALIGNANT MELANOMA OF RIGHT UPPER LIMB, INCLUDING SHOULDER (HCC): ICD-10-CM

## 2023-06-12 DIAGNOSIS — R87.619 ABNORMAL CERVICAL PAPANICOLAOU SMEAR, UNSPECIFIED ABNORMAL PAP FINDING: ICD-10-CM

## 2023-06-12 DIAGNOSIS — R51.9 ACUTE NONINTRACTABLE HEADACHE, UNSPECIFIED HEADACHE TYPE: ICD-10-CM

## 2023-06-12 DIAGNOSIS — Z80.41 FAMILY HISTORY OF OVARIAN CANCER: ICD-10-CM

## 2023-06-12 DIAGNOSIS — F41.1 GENERALIZED ANXIETY DISORDER: ICD-10-CM

## 2023-06-12 DIAGNOSIS — Z00.00 ENCOUNTER FOR MEDICAL EXAMINATION TO ESTABLISH CARE: ICD-10-CM

## 2023-06-12 DIAGNOSIS — Z11.59 NEED FOR HEPATITIS C SCREENING TEST: ICD-10-CM

## 2023-06-12 DIAGNOSIS — R42 DIZZINESS: ICD-10-CM

## 2023-06-12 PROCEDURE — 3078F DIAST BP <80 MM HG: CPT | Performed by: STUDENT IN AN ORGANIZED HEALTH CARE EDUCATION/TRAINING PROGRAM

## 2023-06-12 PROCEDURE — 99214 OFFICE O/P EST MOD 30 MIN: CPT | Performed by: STUDENT IN AN ORGANIZED HEALTH CARE EDUCATION/TRAINING PROGRAM

## 2023-06-12 PROCEDURE — 3074F SYST BP LT 130 MM HG: CPT | Performed by: STUDENT IN AN ORGANIZED HEALTH CARE EDUCATION/TRAINING PROGRAM

## 2023-06-12 ASSESSMENT — FIBROSIS 4 INDEX: FIB4 SCORE: 0.35

## 2023-06-12 ASSESSMENT — PATIENT HEALTH QUESTIONNAIRE - PHQ9: CLINICAL INTERPRETATION OF PHQ2 SCORE: 0

## 2023-06-12 NOTE — LETTER
[facility]  Callie Faria M.D.  [address]  [place] [address]  Fax: [phone]   Authorization for Release/Disclosure of   Protected Health Information   Name: HANNAH CORREA : 1995 SSN: [id number]   Address: [address] [place] [id number] Phone:    [phone] (home)    I authorize the entity listed below to release/disclose the PHI below to:   [facility]/Callie Faria M.D. and Callie Faria M.D.   Provider or Entity Name:  Lj Madison   Address   City, State, [facility]   Phone:      Fax:     Reason for request: continuity of care   Information to be released:    [  ] LAST COLONOSCOPY,  including any PATH REPORT and follow-up  [  ] LAST FIT/COLOGUARD RESULT [  ] LAST DEXA  [  ] LAST MAMMOGRAM  [  ] LAST PAP  [  ] LAST LABS [  ] RETINA EXAM REPORT  [  ] IMMUNIZATION RECORDS  [ XX ] Release all info      [  ] Check here and initial the line next to each item to release ALL health information INCLUDING  _____ Care and treatment for drug and / or alcohol abuse  _____ HIV testing, infection status, or AIDS  _____ Genetic Testing    DATES OF SERVICE OR TIME PERIOD TO BE DISCLOSED: _____________  I understand and acknowledge that:  * This Authorization may be revoked at any time by you in writing, except if your health information has already been used or disclosed.  * Your health information that will be used or disclosed as a result of you signing this authorization could be re-disclosed by the recipient. If this occurs, your re-disclosed health information may no longer be protected by State or Federal laws.  * You may refuse to sign this Authorization. Your refusal will not affect your ability to obtain treatment.  * This Authorization becomes effective upon signing and will  on (date) __________.      If no date is indicated, this Authorization will  one (1) year from the signature date.    Name: Hannah Ruvalcaba  Brit  Signature: Date:   6/12/2023     PLEASE FAX REQUESTED RECORDS BACK TO: [phone]

## 2023-06-12 NOTE — PROGRESS NOTES
Subjective:     CC:  Diagnoses of Encounter for medical examination to establish care, Dizziness, Acute nonintractable headache, unspecified headache type, Malignant melanoma of right upper limb, including shoulder (HCC), Generalized anxiety disorder, Family history of ovarian cancer, Abnormal cervical Papanicolaou smear, unspecified abnormal pap finding, and Need for hepatitis C screening test were pertinent to this visit.    HISTORY OF THE PRESENT ILLNESS: Patient is a 28 y.o. female. This pleasant patient is here today to establish care and discuss the following    Problem   Abnormal Pap Smear of Cervix    History of abnormal Pap smear and did have cervical biopsies that showed precancerous lesions.  Follows with Dr. Madison of OB/GYN.     Dizziness    This is a chronic, recurrent condition.  She states that she has episodes that last about 10 to 15 minutes where she feels dizzy, feels like her heart is racing, and feels nauseous.  She went to urgent care for the symptoms.  At that time EKG was normal.  She only had a few of these episodes and has not had 1 since urgent care.  She states that in the past she has had these episodes as well and they went away on their own.  She has started having headaches as well     Acute Nonintractable Headache    This is a new problem.  She has a headache that is bandlike and squeezing.  This started when she began having dizzy episodes again.     Family History of Ovarian Cancer    Both mother and maternal grandmother had precancerous ovarian lesions     Malignant Melanoma of Right Upper Limb, Including Shoulder (Hcc)    Recent history of melanoma with excision.  Follows with Dr. Jackson and Dr. Bess, last biopsy was WNL last 6/2023, every 3 months for 2 years     Generalized Anxiety Disorder    This is a chronic condition, improving, in the past she has been offered medication but has not used it.  Right now she is managing her anxiety very well with exercise and by talking  "with friends or family.       ROS:   ROS      Objective:     Exam: /60   Pulse 78   Temp 36.6 °C (97.9 °F) (Temporal)   Resp 16   Ht 1.651 m (5' 5\")   Wt 60 kg (132 lb 4.4 oz)   SpO2 98%  Body mass index is 22.01 kg/m².    Physical Exam  Vitals reviewed.   Constitutional:       General: She is not in acute distress.     Appearance: She is not toxic-appearing.   HENT:      Head: Normocephalic and atraumatic.      Right Ear: External ear normal.      Left Ear: External ear normal.   Eyes:      General:         Right eye: No discharge.         Left eye: No discharge.      Extraocular Movements: Extraocular movements intact.      Conjunctiva/sclera: Conjunctivae normal.   Cardiovascular:      Rate and Rhythm: Normal rate and regular rhythm.      Pulses: Normal pulses.      Heart sounds: Normal heart sounds.   Pulmonary:      Effort: Pulmonary effort is normal. No respiratory distress.      Breath sounds: Normal breath sounds. No wheezing or rales.   Skin:     General: Skin is warm and dry.   Neurological:      Mental Status: She is alert.      Cranial Nerves: Cranial nerves 2-12 are intact. No cranial nerve deficit.      Sensory: Sensation is intact. No sensory deficit.      Motor: Motor function is intact. No weakness, atrophy or abnormal muscle tone.   Psychiatric:         Mood and Affect: Mood normal.         Behavior: Behavior normal.         Thought Content: Thought content normal.         Judgment: Judgment normal.           Assessment & Plan:   28 y.o. female with the following -    1. Encounter for medical examination to establish care  History, problem list, medications and allergies reviewed.  Records requested from previous provider if applicable.    2. Dizziness  Chronic, recurrent condition.  EKG WNL.  We discussed possibly getting a Zio patch to monitor these episodes, but patient states that they have been so few and far between and that even wearing this for couple weeks she is not sure she " would catch 1 of these episodes.  She has had headaches that started with these recurrent dizzy episodes.  CT head ordered  - CT-HEAD W/O; Future    3. Acute nonintractable headache, unspecified headache type  New headaches with dizziness, CT scan ordered to rule out intracranial abnormality  - CT-HEAD W/O; Future    4. Malignant melanoma of right upper limb, including shoulder (HCC)  Continue to follow with dermatology for every 3-month biopsies.  Last biopsy WNL    5. Generalized anxiety disorder  This is a chronic condition, currently well managed with exercise and social support.  Discussed with patient that if symptoms are worsening or changing we can always refer to therapy or have a conversation about medication management.  Patient understands    6. Family history of ovarian cancer  Discussed screening for BRCA1 or BRCA2 given her family history of precancerous ovarian lesions in mother and maternal grandmother  - Referral to Genetic Research Studies    7. Abnormal cervical Papanicolaou smear, unspecified abnormal pap finding  Discussed the importance of completing the HPV series.  She is hesitant but states she will think about it, continue to follow with OB/GYN    8. Need for hepatitis C screening test  - HEP C VIRUS ANTIBODY; Future    No follow-ups on file.    Please note that this dictation was created using voice recognition software. I have made every reasonable attempt to correct obvious errors, but I expect that there are errors of grammar and possibly content that I did not discover before finalizing the note.

## 2023-06-14 LAB
ALBUMIN SERPL-MCNC: 4.2 G/DL (ref 3.9–5)
ALBUMIN/GLOB SERPL: 1.6 {RATIO} (ref 1.2–2.2)
ALP SERPL-CCNC: 64 IU/L (ref 44–121)
ALT SERPL-CCNC: 13 IU/L (ref 0–32)
AST SERPL-CCNC: 13 IU/L (ref 0–40)
BASOPHILS # BLD AUTO: 0.1 X10E3/UL (ref 0–0.2)
BASOPHILS NFR BLD AUTO: 1 %
BILIRUB SERPL-MCNC: 0.4 MG/DL (ref 0–1.2)
BUN SERPL-MCNC: 15 MG/DL (ref 6–20)
BUN/CREAT SERPL: 22 (ref 9–23)
CALCIUM SERPL-MCNC: 9.1 MG/DL (ref 8.7–10.2)
CHLORIDE SERPL-SCNC: 106 MMOL/L (ref 96–106)
CO2 SERPL-SCNC: 20 MMOL/L (ref 20–29)
CREAT SERPL-MCNC: 0.67 MG/DL (ref 0.57–1)
EGFRCR SERPLBLD CKD-EPI 2021: 122 ML/MIN/1.73
EOSINOPHIL # BLD AUTO: 0.2 X10E3/UL (ref 0–0.4)
EOSINOPHIL NFR BLD AUTO: 3 %
ERYTHROCYTE [DISTWIDTH] IN BLOOD BY AUTOMATED COUNT: 12.6 % (ref 11.7–15.4)
GLOBULIN SER CALC-MCNC: 2.6 G/DL (ref 1.5–4.5)
GLUCOSE SERPL-MCNC: 92 MG/DL (ref 70–99)
HCT VFR BLD AUTO: 41.9 % (ref 34–46.6)
HCV IGG SERPL QL IA: NON REACTIVE
HGB BLD-MCNC: 13.9 G/DL (ref 11.1–15.9)
IMM GRANULOCYTES # BLD AUTO: 0 X10E3/UL (ref 0–0.1)
IMM GRANULOCYTES NFR BLD AUTO: 0 %
IMMATURE CELLS  115398: NORMAL
LYMPHOCYTES # BLD AUTO: 1.7 X10E3/UL (ref 0.7–3.1)
LYMPHOCYTES NFR BLD AUTO: 27 %
MCH RBC QN AUTO: 30 PG (ref 26.6–33)
MCHC RBC AUTO-ENTMCNC: 33.2 G/DL (ref 31.5–35.7)
MCV RBC AUTO: 90 FL (ref 79–97)
MONOCYTES # BLD AUTO: 0.5 X10E3/UL (ref 0.1–0.9)
MONOCYTES NFR BLD AUTO: 8 %
MORPHOLOGY BLD-IMP: NORMAL
NEUTROPHILS # BLD AUTO: 4 X10E3/UL (ref 1.4–7)
NEUTROPHILS NFR BLD AUTO: 61 %
NRBC BLD AUTO-RTO: NORMAL %
PLATELET # BLD AUTO: 280 X10E3/UL (ref 150–450)
POTASSIUM SERPL-SCNC: 4.8 MMOL/L (ref 3.5–5.2)
PROT SERPL-MCNC: 6.8 G/DL (ref 6–8.5)
RBC # BLD AUTO: 4.64 X10E6/UL (ref 3.77–5.28)
SODIUM SERPL-SCNC: 139 MMOL/L (ref 134–144)
WBC # BLD AUTO: 6.5 X10E3/UL (ref 3.4–10.8)

## 2023-06-21 ENCOUNTER — HOSPITAL ENCOUNTER (OUTPATIENT)
Dept: RADIOLOGY | Facility: MEDICAL CENTER | Age: 28
End: 2023-06-21
Attending: STUDENT IN AN ORGANIZED HEALTH CARE EDUCATION/TRAINING PROGRAM
Payer: COMMERCIAL

## 2023-06-21 DIAGNOSIS — R51.9 ACUTE NONINTRACTABLE HEADACHE, UNSPECIFIED HEADACHE TYPE: ICD-10-CM

## 2023-06-21 DIAGNOSIS — R42 DIZZINESS: ICD-10-CM

## 2023-06-21 PROCEDURE — 70450 CT HEAD/BRAIN W/O DYE: CPT

## 2023-06-21 NOTE — PROGRESS NOTES
Subjective:      Hannah Wiggins is a 21 y.o. female who presents with Stress            HPI Comments: Medications, Allergies and Prior Medical Hx reviewed and updated in [employer].with patient/family today     Patient states she has a history of insomnia and anxiety since middle school. She  saw school counselors without much improvement. Has not seen anybody since she has been on the middle school. She has stress from her job where she works at the [employer] where they move her from station to station it is difficult for her to get comfortable in any one location. She has a history of cutting herself. She denies any suicidal or homicidal ideation at this time.. She does use Ambien to sleep and sleeps well with the Ambien. She is eating well. To relieve stress. She visits and talks to her sister. And radiates plays with her dog.    Stress  Presents for initial visit. Onset was 1 to 4 weeks ago. The problem has been unchanged. Symptoms include decreased concentration, depressed mood, excessive worry, insomnia and nervous/anxious behavior. Patient reports no confusion, nausea, shortness of breath or suicidal ideas. Symptoms occur constantly. The severity of symptoms is moderate and causing significant distress. The symptoms are aggravated by work stress.     Risk factors include family history. Past treatments include counseling (CBT). The treatment provided no relief. Prior compliance problems include insurance issues.       Review of Systems   Constitutional: Negative for fever and chills.   HENT: Negative for congestion.    Respiratory: Negative for cough and shortness of breath.    Gastrointestinal: Negative for nausea, vomiting and abdominal pain.   Musculoskeletal: Negative for myalgias.   Skin: Negative for rash.   Neurological: Negative for headaches.   Psychiatric/Behavioral: Positive for decreased concentration. Negative for suicidal ideas, hallucinations, confusion and substance abuse. The patient  is nervous/anxious and has insomnia.           Objective:     /68 mmHg  Pulse 72  Temp(Src) 36.6 °C (97.9 °F)  Wt 65.318 kg (144 lb)  SpO2 98%  LMP 01/03/2017  Breastfeeding? No     Physical Exam   Constitutional: She appears well-developed and well-nourished. No distress.   HENT:   Head: Normocephalic and atraumatic.   Eyes: Conjunctivae are normal. Pupils are equal, round, and reactive to light.   Neck: Neck supple.   Cardiovascular: Normal rate, regular rhythm and normal heart sounds.    Pulmonary/Chest: Effort normal and breath sounds normal. No respiratory distress.   Neurological: She is alert.   Awake, alert, answering questions appropriately, moving all extremeties   Skin: Skin is warm and dry. No rash noted.   Psychiatric: Her speech is normal and behavior is normal. Her mood appears anxious. She is not agitated, not withdrawn and not actively hallucinating. She expresses no homicidal and no suicidal ideation. She expresses no suicidal plans and no homicidal plans. She is attentive.   Vitals reviewed.              Assessment/Plan:       1. Anxiety  REFERRAL TO BEHAVIORAL HEALTH       Patient is also to find a new PCP who she is comfortable with this and discuss these issues and possibly medications.  Patient agrees to go to the emergency room for any thoughts of suicidal or homicidal ideation.    Pt will go to the ER for worsening or changing symptoms as discussed,   Follow-up with therapist at the next available appt  Discharge instructions discussed with pt/family who verbalize understanding and agreement with poc   BMI greater than 45

## 2023-07-11 ENCOUNTER — RESEARCH ENCOUNTER (OUTPATIENT)
Dept: RESEARCH | Facility: WORKSITE | Age: 28
End: 2023-07-11
Attending: STUDENT IN AN ORGANIZED HEALTH CARE EDUCATION/TRAINING PROGRAM
Payer: COMMERCIAL

## 2023-07-11 DIAGNOSIS — Z00.6 RESEARCH STUDY PATIENT: ICD-10-CM

## 2023-08-04 ENCOUNTER — OFFICE VISIT (OUTPATIENT)
Dept: MEDICAL GROUP | Facility: MEDICAL CENTER | Age: 28
End: 2023-08-04
Payer: COMMERCIAL

## 2023-08-04 VITALS
DIASTOLIC BLOOD PRESSURE: 50 MMHG | OXYGEN SATURATION: 97 % | BODY MASS INDEX: 21.6 KG/M2 | HEIGHT: 65 IN | TEMPERATURE: 98 F | WEIGHT: 129.63 LBS | SYSTOLIC BLOOD PRESSURE: 100 MMHG | HEART RATE: 78 BPM

## 2023-08-04 DIAGNOSIS — F41.1 GENERALIZED ANXIETY DISORDER: ICD-10-CM

## 2023-08-04 PROCEDURE — 99213 OFFICE O/P EST LOW 20 MIN: CPT | Performed by: STUDENT IN AN ORGANIZED HEALTH CARE EDUCATION/TRAINING PROGRAM

## 2023-08-04 PROCEDURE — 3078F DIAST BP <80 MM HG: CPT | Performed by: STUDENT IN AN ORGANIZED HEALTH CARE EDUCATION/TRAINING PROGRAM

## 2023-08-04 PROCEDURE — 3074F SYST BP LT 130 MM HG: CPT | Performed by: STUDENT IN AN ORGANIZED HEALTH CARE EDUCATION/TRAINING PROGRAM

## 2023-08-04 ASSESSMENT — FIBROSIS 4 INDEX: FIB4 SCORE: 0.36

## 2023-08-04 NOTE — PROGRESS NOTES
"Subjective:     CC: DREA    HPI:   Hannah presents today with    Problem   Generalized Anxiety Disorder    This is a chronic condition, improving, in the past she has been offered medication but has not used it.  Right now she is managing her anxiety very well with exercise and by talking with friends or family.  She also has an emotional support dog who is helping her with anxiety.  She is moving into a new apartment and is hoping for a letter so the dog and come with her         ROS:  ROS    Objective:     Exam:  /50 (BP Location: Left arm, Patient Position: Sitting, BP Cuff Size: Adult)   Pulse 78   Temp 36.7 °C (98 °F) (Temporal)   Ht 1.651 m (5' 5\")   Wt 58.8 kg (129 lb 10.1 oz)   SpO2 97%   BMI 21.57 kg/m²  Body mass index is 21.57 kg/m².    Physical Exam  Vitals reviewed.   Constitutional:       General: She is not in acute distress.     Appearance: She is not toxic-appearing.      Comments: Exam through observation only   HENT:      Head: Normocephalic and atraumatic.      Right Ear: External ear normal.      Left Ear: External ear normal.   Eyes:      General:         Right eye: No discharge.         Left eye: No discharge.      Extraocular Movements: Extraocular movements intact.      Conjunctiva/sclera: Conjunctivae normal.   Pulmonary:      Effort: Pulmonary effort is normal. No respiratory distress.   Skin:     General: Skin is warm and dry.   Neurological:      Mental Status: She is alert.   Psychiatric:         Mood and Affect: Mood normal.         Behavior: Behavior normal.         Thought Content: Thought content normal.         Judgment: Judgment normal.             Assessment & Plan:     28 y.o. female with the following -     1. Generalized anxiety disorder  Chronic, improving, does not need medication at this time, provided letter for emotional support animal    No follow-ups on file.    Please note that this dictation was created using voice recognition software. I have made every " reasonable attempt to correct obvious errors, but I expect that there are errors of grammar and possibly content that I did not discover before finalizing the note.

## 2023-08-04 NOTE — LETTER
2023    To Whom It May Concern,    Re: Hannah; : 1995; Emotional Support Animal letter    Hannah is my patient, and has been under my care since . I am intimately aware of her medical history and functional restrictions brought by her mental condition. She meets the definition of disabled under the Americans with Disabilities Act, the Rehabilitation Act of 1973, and the Fair Housing Act.    As a result of mental illness, she has certain limitations related to anxiety. In order to assist in alleviating these difficulties, and to improve their ability to lead a better life while fully enjoying and using the dwelling unit you own and/or manage, I am prescribing an Emotional Support Animal prescription letter that will help Hannah in dealing with her disability better.     I am very much aware of the voluminous professional literature related to the therapeutic benefits of emotional support animals for individuals with mental disabilities, such as that faced by Hannah.     I am a licensed physician in the [facility].     Should you have any further questions, please do not hesitate to contact with me.    Callie Faria MD

## 2023-08-15 LAB
APOB+LDLR+PCSK9 GENE MUT ANL BLD/T: NOT DETECTED
BRCA1+BRCA2 DEL+DUP + FULL MUT ANL BLD/T: NOT DETECTED
MLH1+MSH2+MSH6+PMS2 GN DEL+DUP+FUL M: NOT DETECTED

## 2024-02-28 ENCOUNTER — OFFICE VISIT (OUTPATIENT)
Dept: OBGYN | Facility: CLINIC | Age: 29
End: 2024-02-28

## 2024-02-28 ENCOUNTER — APPOINTMENT (OUTPATIENT)
Dept: RADIOLOGY | Facility: IMAGING CENTER | Age: 29
End: 2024-02-28

## 2024-02-28 ENCOUNTER — APPOINTMENT (OUTPATIENT)
Dept: OBGYN | Facility: CLINIC | Age: 29
End: 2024-02-28

## 2024-02-28 VITALS — WEIGHT: 134 LBS | BODY MASS INDEX: 22.3 KG/M2 | DIASTOLIC BLOOD PRESSURE: 70 MMHG | SYSTOLIC BLOOD PRESSURE: 110 MMHG

## 2024-02-28 DIAGNOSIS — O02.1 MISSED AB: Primary | ICD-10-CM

## 2024-02-28 DIAGNOSIS — N91.2 AMENORRHEA: ICD-10-CM

## 2024-02-28 DIAGNOSIS — O03.9 FETAL DEMISE DUE TO MISCARRIAGE: ICD-10-CM

## 2024-02-28 PROCEDURE — 99214 OFFICE O/P EST MOD 30 MIN: CPT | Performed by: NURSE PRACTITIONER

## 2024-02-28 PROCEDURE — 76801 OB US < 14 WKS SINGLE FETUS: CPT | Mod: TC | Performed by: NURSE PRACTITIONER

## 2024-02-28 PROCEDURE — 3074F SYST BP LT 130 MM HG: CPT | Performed by: NURSE PRACTITIONER

## 2024-02-28 PROCEDURE — 3078F DIAST BP <80 MM HG: CPT | Performed by: NURSE PRACTITIONER

## 2024-02-28 ASSESSMENT — FIBROSIS 4 INDEX: FIB4 SCORE: 0.37

## 2024-02-28 NOTE — LETTER
[facility]  [address] [address]     February 28, 2024    Patient: Hannah Perea   YOB: 1995   Date of Visit: 2/28/2024       To Whom It May Concern:    Hannah Perea was seen and treated in our department on 2/28/2024.   Please excuse her from work from 2/28/2024 until 03/05/2024  She can return to work without limitations on 03/05/2024    Please contact me with any concerns or questions    Sincerely,         Noemy Lee C.N.M.

## 2024-02-28 NOTE — PROGRESS NOTES
"HPI Comments:  Hannah Perea is a 29 y.o. y.o. female who presents for problem gyn visit: missed AB. Pt has complaints of spotting and cramping. No LMP recorded. Patient is pregnant.      Hannah was scheduled today for NOB visit and  US  She is a  with hx of  x 1- denies any complications. She was 34 weeks when she had SROM and spontaneous PTL and delivery  Denies any breast issues  States she has regular monthly periods, but was not trying to conceive.   LMP was 2023 which would make her 9w2d  Denies any STI or other infections. Has no concerns presently for discharge, itching, burning, or odor.    US done today shows a IUP at 8w0d, but no FHT's noted.  Patient had been having spotting/bleeding with clots and cramping    Review of Systems :  Constitutional: neg, alert and oriented x 4  EENT: neg  Cardio: neg  Resp: neg  GI: neg  : neg  Pertinent positives documented in HPI and all other systems reviewed & are negative    All PMH, PSH, allergies, social history and FH reviewed and updated today:  Past Medical History:   Diagnosis Date    Anxiety     Cancer (HCC)     \"pre cancerous cells on cervix, removed\"    Snoring     no sleep study    TMJ disease      Past Surgical History:   Procedure Laterality Date    [place] BX/REMV,LYMPH NODE,DEEP AXILL Right 2022    Procedure: BIOPSY, LYMPH NODE, SENTINEL;  Surgeon: George Bess M.D.;  Location: SURGERY SAME DAY [facility];  Service: General    WIDE EXCISION, LESION, UPPER EXTREMITY Right 2022    Procedure: RADICAL WIDE EXCISION OF MALIGNANT MELANOMA OF RIGHT UPPER ARM; RIGHT AXILLARY SENTINEL NODE BIOPSY;  Surgeon: George Bess M.D.;  Location: SURGERY SAME DAY [facility];  Service: General    COLONOSCOPY - ENDO       Patient has no known allergies.  Social History     Socioeconomic History    Marital status:    Tobacco Use    Smoking status: Never    Smokeless tobacco: Never   Vaping Use    Vaping Use: Never used "   Substance and Sexual Activity    Alcohol use: Not Currently    Drug use: Not Currently     Comment: marijuana     Family History   Problem Relation Age of Onset    Ovarian Cancer Mother     Lupus Mother     Depression Maternal Aunt     Ovarian Cancer Maternal Grandmother     Hypertension Maternal Grandmother     Stroke Maternal Grandmother     Tubal Cancer Neg Hx     Peritoneal Cancer Neg Hx     Colorectal Cancer Neg Hx     Breast Cancer Neg Hx      Medications:   No current [facility]-ordered outpatient medications on file.     No current Epic-ordered facility-administered medications on file.          Objective:   Vital measurements:  There were no vitals taken for this visit.  There is no height or weight on file to calculate BMI. (Goal BM I>18 <25)    Physical Exam   Nursing note and vitals reviewed.  Constitutional: She is oriented to person, place, and time. She appears well-developed and well-nourished. No distress.     Abdominal: Soft. Bowel sounds are normal. She exhibits no distension and no mass. No tenderness. She has no rebound and no guarding.     Breast:  Inspection negative. No nipple discharge or bleeding    Genitourinary:  Pelvic exam was deferred at this time per patient preference    Neurological: She is alert and oriented to person, place, and time. She exhibits normal muscle tone.     Skin: Skin is warm and dry. No rash noted. She is not diaphoretic. No erythema. No pallor.     Psychiatric: She has a normal mood and affect. Her behavior is normal. Judgment and thought content normal.     Her and partner are appropriately grieving at this time. Good support noted at home and with each other       Assessment:     1. Missed ab        2. Fetal demise due to miscarriage          Reviewed treatment options- discussed PV cytotec, but she declines. She feels as though her body is already cramping and bleeding. She feels like her body is passing the pregnancy on its own.    Follow up in 7-10 days for  reassessment- appt scheduled for follow up next Thursday    Strict SAB and bleeding precautions reviewed- she is to return to clinic or ED for severe pain, heavy vaginal bleeding, signs of infection, or other concerns    Will discuss medication and management next appt if necessary    Plan:   Gyn exam performed   Monthly SBE.  Counseling: breast self exam and family planning choices  Encourage exercise and proper diet.  See medications and orders placed in encounter report.    No follow-ups on file.     Follow up as scheduled or sooner PRN    Noemy MICHAELM, APRN

## 2024-02-28 NOTE — PROGRESS NOTES
Pt here to discuss SAB  LMP 12/25/23  Pt states she has been experiencing bleeding and cramping for two days  Good # [phone] (home)    Pharmacy verified.

## 2024-03-07 ENCOUNTER — OFFICE VISIT (OUTPATIENT)
Dept: OBGYN | Facility: CLINIC | Age: 29
End: 2024-03-07

## 2024-03-07 DIAGNOSIS — O03.9 SAB (SPONTANEOUS ABORTION): ICD-10-CM

## 2024-03-07 PROCEDURE — 0502F SUBSEQUENT PRENATAL CARE: CPT | Performed by: PHYSICIAN ASSISTANT

## 2024-03-07 ASSESSMENT — ENCOUNTER SYMPTOMS
NERVOUS/ANXIOUS: 0
DEPRESSION: 0

## 2024-03-07 ASSESSMENT — LIFESTYLE VARIABLES: SUBSTANCE_ABUSE: 0

## 2024-03-07 NOTE — LETTER
March 7, 2024       Patient: Hannah Perea   YOB: 1995   Date of Visit: 3/7/2024         To Whom It May Concern:    In my medical opinion, I recommend that Aniceto Sheffield be excused from work from 2/28/24 to 3/6/24 due to a medical emergency. Thank you.     If you have any questions or concerns, please don't hesitate to call [phone]          Sincerely,          YOANA Kumari.

## 2024-03-07 NOTE — PROGRESS NOTES
Subjective     Hannah Perea is a 29 y.o. female who presents with Gynecologic Exam (Follow up on SAB)  Pt here after being diagnosed with SAB last week. Pt states she had strong cramping and bleeding Wednesday, yesterday and bleeding has gotten light since, pain has improved though there is some mild tenderness in lower abd only. Denies fever or chills, no foul smelling d/c. Pt has good support with FOB, grieving appropriately. Wants to try for another pregnancy, so d/w pt need for PNV, waiting 2 cycles and pt receptive. Declines bhcg testing today, which I support.           HPI    Review of Systems   Psychiatric/Behavioral:  Negative for depression and substance abuse. The patient is not nervous/anxious.    All other systems reviewed and are negative.             Objective     LMP 12/25/2023      Physical Exam  Vitals reviewed.   Constitutional:       Appearance: She is normal weight.   HENT:      Head: Normocephalic.      Nose: Nose normal.      Mouth/Throat:      Mouth: Mucous membranes are moist.      Pharynx: Oropharynx is clear.   Eyes:      Pupils: Pupils are equal, round, and reactive to light.   Pulmonary:      Effort: Pulmonary effort is normal.   Abdominal:      General: Abdomen is flat. There is no distension.      Palpations: Abdomen is soft. There is no mass.      Tenderness: There is no abdominal tenderness. There is no guarding.   Musculoskeletal:         General: Normal range of motion.      Cervical back: Normal range of motion.   Skin:     General: Skin is warm and dry.   Neurological:      General: No focal deficit present.      Mental Status: She is alert and oriented to person, place, and time.   Psychiatric:         Mood and Affect: Mood normal.         Behavior: Behavior normal.         Thought Content: Thought content normal.         Judgment: Judgment normal.           US performed shows endometrial stripe, no gest sac seen.                 Assessment & Plan        SAB  - complete, passed Wednesday. Work note given for partner, Pt desires pregnancy so declines any BCM. Pt to wait 2 cycles before TTC. Pt grieving well.

## 2024-03-07 NOTE — PROGRESS NOTES
Patient here for GYN follow up on SAB  Last seen on: 2/28/2024  LMP 12/25/2023  Last pap smear 2/5/2018 WNL  Pt states on Wednesday passed a round size clot now having spotting, and cramping.

## 2024-06-17 ENCOUNTER — APPOINTMENT (OUTPATIENT)
Dept: MEDICAL GROUP | Facility: MEDICAL CENTER | Age: 29
End: 2024-06-17

## 2024-06-17 VITALS
BODY MASS INDEX: 23.09 KG/M2 | WEIGHT: 138.6 LBS | RESPIRATION RATE: 16 BRPM | OXYGEN SATURATION: 97 % | DIASTOLIC BLOOD PRESSURE: 50 MMHG | HEART RATE: 75 BPM | HEIGHT: 65 IN | SYSTOLIC BLOOD PRESSURE: 90 MMHG | TEMPERATURE: 98 F

## 2024-06-17 DIAGNOSIS — Z13.21 ENCOUNTER FOR VITAMIN DEFICIENCY SCREENING: ICD-10-CM

## 2024-06-17 DIAGNOSIS — Z13.0 SCREENING, ANEMIA, DEFICIENCY, IRON: ICD-10-CM

## 2024-06-17 DIAGNOSIS — Z11.4 ENCOUNTER FOR SCREENING FOR HIV: ICD-10-CM

## 2024-06-17 DIAGNOSIS — Z13.1 SCREENING FOR DIABETES MELLITUS: ICD-10-CM

## 2024-06-17 DIAGNOSIS — Z00.00 ANNUAL PHYSICAL EXAM: ICD-10-CM

## 2024-06-17 DIAGNOSIS — Z13.29 SCREENING FOR THYROID DISORDER: ICD-10-CM

## 2024-06-17 DIAGNOSIS — Z13.6 SCREENING FOR CARDIOVASCULAR CONDITION: ICD-10-CM

## 2024-06-17 DIAGNOSIS — F43.21 GRIEF REACTION: ICD-10-CM

## 2024-06-17 PROCEDURE — 3078F DIAST BP <80 MM HG: CPT | Performed by: STUDENT IN AN ORGANIZED HEALTH CARE EDUCATION/TRAINING PROGRAM

## 2024-06-17 PROCEDURE — 99214 OFFICE O/P EST MOD 30 MIN: CPT | Mod: 25 | Performed by: STUDENT IN AN ORGANIZED HEALTH CARE EDUCATION/TRAINING PROGRAM

## 2024-06-17 PROCEDURE — 99395 PREV VISIT EST AGE 18-39: CPT | Performed by: STUDENT IN AN ORGANIZED HEALTH CARE EDUCATION/TRAINING PROGRAM

## 2024-06-17 PROCEDURE — 3074F SYST BP LT 130 MM HG: CPT | Performed by: STUDENT IN AN ORGANIZED HEALTH CARE EDUCATION/TRAINING PROGRAM

## 2024-06-17 ASSESSMENT — FIBROSIS 4 INDEX: FIB4 SCORE: 0.37

## 2024-06-17 ASSESSMENT — PATIENT HEALTH QUESTIONNAIRE - PHQ9: CLINICAL INTERPRETATION OF PHQ2 SCORE: 0

## 2024-06-17 NOTE — PROGRESS NOTES
Subjective:     CC:   Chief Complaint   Patient presents with    Annual Exam       HPI:   Hannah Perea is a 29 y.o. female who presents for annual exam and to discuss the following.  She does feel like that she has been down recently.  She did have a miscarriage earlier this year.  In the past she has seen a therapist but stopped seeing them.  She is open to seeing a therapist again.  She has been prescribed medication for depression or anxiety in the past but she has never ended up taking it.  No thoughts of suicide.    Ob-Gyn/ History:    Patient has GYN provider: yes  Last Pap Smear:  Due, scheduled.     Health Maintenance  Cholesterol Screening: Ordered   Diabetes Screening: Ordered   Aspirin Use: NA      Cancer screening  Colorectal Cancer Screening: Start at age 45    Lung Cancer Screening: NA    Cervical Cancer Screening: Due, scheduled   Breast Cancer Screening: Start at age 40     Infectious disease screening/Immunizations  --HIV Screening: Ordered   --Hepatitis C Screening: Up to date   --Immunizations: Due for dose 3 of HPV vaccine, declines today    She  has a past medical history of Anxiety, Cancer (HCC), Snoring, and TMJ disease.  She  has a past surgical history that includes colonoscopy - endo; pr bx/remv,lymph node,deep axill (Right, 7/28/2022); and wide excision, lesion, upper extremity (Right, 7/28/2022).    Family History   Problem Relation Age of Onset    Ovarian Cancer Mother     Lupus Mother     Depression Maternal Aunt     Ovarian Cancer Maternal Grandmother     Hypertension Maternal Grandmother     Stroke Maternal Grandmother     Tubal Cancer Neg Hx     Peritoneal Cancer Neg Hx     Colorectal Cancer Neg Hx     Breast Cancer Neg Hx        Social History     Socioeconomic History    Marital status:      Spouse name: Not on file    Number of children: Not on file    Years of education: Not on file    Highest education level: Not on file   Occupational History     "Not on file   Tobacco Use    Smoking status: Never    Smokeless tobacco: Never   Vaping Use    Vaping status: Never Used   Substance and Sexual Activity    Alcohol use: Yes     Comment: rare    Drug use: Yes     Types: Marijuana     Comment: rare    Sexual activity: Not on file   Other Topics Concern    Not on file   Social History Narrative    Not on file     Social Determinants of Health     Financial Resource Strain: Not on file   Food Insecurity: Not on file   Transportation Needs: Not on file   Physical Activity: Not on file   Stress: Not on file   Social Connections: Not on file   Intimate Partner Violence: Not on file   Housing Stability: Not on file       Patient Active Problem List    Diagnosis Date Noted    SAB (spontaneous ) 2024    Abnormal Pap smear of cervix 2023    Dizziness 2023    Acute nonintractable headache 2023    Family history of ovarian cancer 2023    Malignant melanoma of right upper limb, including shoulder (HCC) 2022    Generalized anxiety disorder 2018         No current outpatient medications on file.     No current facility-administered medications for this visit.     No Known Allergies      Objective:     BP 90/50 (BP Location: Left arm, Patient Position: Sitting, BP Cuff Size: Adult)   Pulse 75   Temp 36.7 °C (98 °F) (Temporal)   Resp 16   Ht 1.651 m (5' 5\")   Wt 62.9 kg (138 lb 9.6 oz)   SpO2 97%   BMI 23.06 kg/m²   Body mass index is 23.06 kg/m².  Wt Readings from Last 4 Encounters:   24 62.9 kg (138 lb 9.6 oz)   24 60.8 kg (134 lb)   23 58.8 kg (129 lb 10.1 oz)   23 60 kg (132 lb 4.4 oz)       Physical Exam:  Constitutional: Well-developed and well-nourished. Not diaphoretic. No distress.   Skin: Skin is warm and dry. No rash noted.  Head: Atraumatic without lesions.  Eyes: Conjunctivae and extraocular motions are normal. Pupils are equal, round, and reactive to light. No scleral icterus.   Ears:  " External ears unremarkable. Tympanic membranes clear and intact.  Nose: Nares patent. Septum midline. Turbinates without erythema nor edema. No discharge.   Mouth/Throat: Dentition is normal. Tongue normal. Oropharynx is clear and moist. Posterior pharynx without erythema or exudates.  Neck: Supple, trachea midline. Normal range of motion. No thyromegaly present. No lymphadenopathy--cervical or supraclavicular.  Cardiovascular: Regular rate and rhythm, S1 and S2 without murmur, rubs, or gallops.  Lungs: Normal inspiratory effort, CTA bilaterally, no wheezes/rhonchi/rales  Abdomen: Soft, non tender, and without distention. Active bowel sounds in all four quadrants. No rebound, guarding, masses or HSM.  Extremities: No cyanosis, clubbing, erythema, nor edema.   Musculoskeletal: All major joints AROM full in all directions without pain.  Neurological: Alert and oriented x 3.   Psychiatric:  Behavior, mood, and affect are appropriate.      Assessment and Plan:     1. Annual physical exam  We discussed routine screenings and vaccinations.  Return for annual exam in 1 year    2. Grief reaction  New condition. The patient denies any suicidal ideation.  She is open to seeing a therapist so I will put in this referral.  We discussed the potential of using medications.  She will let me know if she ever wants to discuss or start medications for her grief reaction  - Referral to Behavioral Health    3. Screening for cardiovascular condition  - Lipid Profile; Future    4. Encounter for screening for HIV  - HIV AG/AB COMBO ASSAY SCREENING; Future    5. Screening for diabetes mellitus  - HEMOGLOBIN A1C; Future  - Comp Metabolic Panel; Future    6. Screening, anemia, deficiency, iron  - CBC WITH DIFFERENTIAL; Future    7. Encounter for vitamin deficiency screening  - VITAMIN D,25 HYDROXY (DEFICIENCY); Future    8. Screening for thyroid disorder  - TSH WITH REFLEX TO FT4; Future    Please note that this dictation was created using  voice recognition software. I have made every reasonable attempt to correct obvious errors, but I expect that there are errors of grammar and possibly content that I did not discover before finalizing the note.    Follow-up: No follow-ups on file.

## 2024-07-09 ENCOUNTER — APPOINTMENT (OUTPATIENT)
Dept: ADMISSIONS | Facility: MEDICAL CENTER | Age: 29
End: 2024-07-09
Attending: SURGERY
Payer: COMMERCIAL

## 2024-07-10 LAB
25(OH)D3+25(OH)D2 SERPL-MCNC: 31.9 NG/ML (ref 30–100)
ALBUMIN SERPL-MCNC: 4 G/DL (ref 4–5)
ALP SERPL-CCNC: 64 IU/L (ref 44–121)
ALT SERPL-CCNC: 20 IU/L (ref 0–32)
AST SERPL-CCNC: 21 IU/L (ref 0–40)
BASOPHILS # BLD AUTO: 0 X10E3/UL (ref 0–0.2)
BASOPHILS NFR BLD AUTO: 1 %
BILIRUB SERPL-MCNC: 0.5 MG/DL (ref 0–1.2)
BUN SERPL-MCNC: 15 MG/DL (ref 6–20)
BUN/CREAT SERPL: 21 (ref 9–23)
CALCIUM SERPL-MCNC: 9 MG/DL (ref 8.7–10.2)
CHLORIDE SERPL-SCNC: 105 MMOL/L (ref 96–106)
CHOLEST SERPL-MCNC: 159 MG/DL (ref 100–199)
CO2 SERPL-SCNC: 21 MMOL/L (ref 20–29)
CREAT SERPL-MCNC: 0.71 MG/DL (ref 0.57–1)
EGFRCR SERPLBLD CKD-EPI 2021: 118 ML/MIN/1.73
EOSINOPHIL # BLD AUTO: 0.1 X10E3/UL (ref 0–0.4)
EOSINOPHIL NFR BLD AUTO: 3 %
ERYTHROCYTE [DISTWIDTH] IN BLOOD BY AUTOMATED COUNT: 12.6 % (ref 11.7–15.4)
GLOBULIN SER CALC-MCNC: 2.7 G/DL (ref 1.5–4.5)
GLUCOSE SERPL-MCNC: 93 MG/DL (ref 70–99)
HBA1C MFR BLD: 5.4 % (ref 4.8–5.6)
HCT VFR BLD AUTO: 39.9 % (ref 34–46.6)
HDLC SERPL-MCNC: 62 MG/DL
HGB BLD-MCNC: 13.1 G/DL (ref 11.1–15.9)
HIV 1+2 AB+HIV1 P24 AG SERPL QL IA: NON REACTIVE
IMM GRANULOCYTES # BLD AUTO: 0 X10E3/UL (ref 0–0.1)
IMM GRANULOCYTES NFR BLD AUTO: 0 %
IMMATURE CELLS  115398: NORMAL
LDL CALC COMMENT:: NORMAL
LDLC SERPL CALC-MCNC: 83 MG/DL (ref 0–99)
LYMPHOCYTES # BLD AUTO: 1.6 X10E3/UL (ref 0.7–3.1)
LYMPHOCYTES NFR BLD AUTO: 33 %
MCH RBC QN AUTO: 29.4 PG (ref 26.6–33)
MCHC RBC AUTO-ENTMCNC: 32.8 G/DL (ref 31.5–35.7)
MCV RBC AUTO: 90 FL (ref 79–97)
MONOCYTES # BLD AUTO: 0.4 X10E3/UL (ref 0.1–0.9)
MONOCYTES NFR BLD AUTO: 8 %
MORPHOLOGY BLD-IMP: NORMAL
NEUTROPHILS # BLD AUTO: 2.7 X10E3/UL (ref 1.4–7)
NEUTROPHILS NFR BLD AUTO: 55 %
NRBC BLD AUTO-RTO: NORMAL %
PLATELET # BLD AUTO: 279 X10E3/UL (ref 150–450)
POTASSIUM SERPL-SCNC: 4.1 MMOL/L (ref 3.5–5.2)
PROT SERPL-MCNC: 6.7 G/DL (ref 6–8.5)
RBC # BLD AUTO: 4.45 X10E6/UL (ref 3.77–5.28)
SODIUM SERPL-SCNC: 139 MMOL/L (ref 134–144)
TRIGL SERPL-MCNC: 74 MG/DL (ref 0–149)
TSH SERPL DL<=0.005 MIU/L-ACNC: 2.05 UIU/ML (ref 0.45–4.5)
VLDLC SERPL CALC-MCNC: 14 MG/DL (ref 5–40)
WBC # BLD AUTO: 4.8 X10E3/UL (ref 3.4–10.8)

## 2024-07-11 ENCOUNTER — PRE-ADMISSION TESTING (OUTPATIENT)
Dept: ADMISSIONS | Facility: MEDICAL CENTER | Age: 29
End: 2024-07-11
Attending: SURGERY
Payer: COMMERCIAL

## 2024-07-17 ENCOUNTER — RESEARCH ENCOUNTER (OUTPATIENT)
Dept: RESEARCH | Facility: MEDICAL CENTER | Age: 29
End: 2024-07-17

## 2024-08-07 ENCOUNTER — APPOINTMENT (OUTPATIENT)
Dept: ADMISSIONS | Facility: MEDICAL CENTER | Age: 29
End: 2024-08-07
Attending: SURGERY
Payer: COMMERCIAL

## 2024-08-07 DIAGNOSIS — Z01.812 PRE-OPERATIVE LABORATORY EXAMINATION: ICD-10-CM

## 2024-08-07 LAB — HCG UR QL: NEGATIVE

## 2024-08-07 PROCEDURE — 81025 URINE PREGNANCY TEST: CPT

## 2024-08-08 ENCOUNTER — HOSPITAL ENCOUNTER (OUTPATIENT)
Facility: MEDICAL CENTER | Age: 29
End: 2024-08-08
Attending: SURGERY | Admitting: SURGERY
Payer: COMMERCIAL

## 2024-08-08 ENCOUNTER — ANESTHESIA (OUTPATIENT)
Dept: SURGERY | Facility: MEDICAL CENTER | Age: 29
End: 2024-08-08
Payer: COMMERCIAL

## 2024-08-08 ENCOUNTER — ANESTHESIA EVENT (OUTPATIENT)
Dept: SURGERY | Facility: MEDICAL CENTER | Age: 29
End: 2024-08-08
Payer: COMMERCIAL

## 2024-08-08 VITALS
HEART RATE: 60 BPM | HEIGHT: 65 IN | WEIGHT: 138.89 LBS | TEMPERATURE: 96.9 F | BODY MASS INDEX: 23.14 KG/M2 | OXYGEN SATURATION: 100 % | SYSTOLIC BLOOD PRESSURE: 109 MMHG | RESPIRATION RATE: 16 BRPM | DIASTOLIC BLOOD PRESSURE: 70 MMHG

## 2024-08-08 DIAGNOSIS — R22.31 MASS OF RIGHT AXILLA: ICD-10-CM

## 2024-08-08 LAB — PATHOLOGY CONSULT NOTE: NORMAL

## 2024-08-08 PROCEDURE — 700105 HCHG RX REV CODE 258: Performed by: SURGERY

## 2024-08-08 PROCEDURE — 160035 HCHG PACU - 1ST 60 MINS PHASE I: Performed by: SURGERY

## 2024-08-08 PROCEDURE — 160039 HCHG SURGERY MINUTES - EA ADDL 1 MIN LEVEL 3: Performed by: SURGERY

## 2024-08-08 PROCEDURE — 160028 HCHG SURGERY MINUTES - 1ST 30 MINS LEVEL 3: Performed by: SURGERY

## 2024-08-08 PROCEDURE — 700111 HCHG RX REV CODE 636 W/ 250 OVERRIDE (IP): Mod: JZ | Performed by: ANESTHESIOLOGY

## 2024-08-08 PROCEDURE — 700111 HCHG RX REV CODE 636 W/ 250 OVERRIDE (IP): Performed by: SURGERY

## 2024-08-08 PROCEDURE — 700102 HCHG RX REV CODE 250 W/ 637 OVERRIDE(OP): Performed by: ANESTHESIOLOGY

## 2024-08-08 PROCEDURE — 160009 HCHG ANES TIME/MIN: Performed by: SURGERY

## 2024-08-08 PROCEDURE — 160048 HCHG OR STATISTICAL LEVEL 1-5: Performed by: SURGERY

## 2024-08-08 PROCEDURE — 160002 HCHG RECOVERY MINUTES (STAT): Performed by: SURGERY

## 2024-08-08 PROCEDURE — 700111 HCHG RX REV CODE 636 W/ 250 OVERRIDE (IP): Performed by: ANESTHESIOLOGY

## 2024-08-08 PROCEDURE — 88305 TISSUE EXAM BY PATHOLOGIST: CPT

## 2024-08-08 PROCEDURE — 160046 HCHG PACU - 1ST 60 MINS PHASE II: Performed by: SURGERY

## 2024-08-08 PROCEDURE — A9270 NON-COVERED ITEM OR SERVICE: HCPCS | Performed by: ANESTHESIOLOGY

## 2024-08-08 PROCEDURE — 700101 HCHG RX REV CODE 250: Performed by: ANESTHESIOLOGY

## 2024-08-08 PROCEDURE — 160036 HCHG PACU - EA ADDL 30 MINS PHASE I: Performed by: SURGERY

## 2024-08-08 PROCEDURE — 160025 RECOVERY II MINUTES (STATS): Performed by: SURGERY

## 2024-08-08 RX ORDER — HYDROMORPHONE HYDROCHLORIDE 1 MG/ML
0.1 INJECTION, SOLUTION INTRAMUSCULAR; INTRAVENOUS; SUBCUTANEOUS
Status: DISCONTINUED | OUTPATIENT
Start: 2024-08-08 | End: 2024-08-08

## 2024-08-08 RX ORDER — HYDROCODONE BITARTRATE AND ACETAMINOPHEN 5; 325 MG/1; MG/1
1 TABLET ORAL EVERY 6 HOURS PRN
Status: DISCONTINUED | OUTPATIENT
Start: 2024-08-08 | End: 2024-08-08 | Stop reason: HOSPADM

## 2024-08-08 RX ORDER — MEPERIDINE HYDROCHLORIDE 25 MG/ML
12.5 INJECTION INTRAMUSCULAR; INTRAVENOUS; SUBCUTANEOUS
Status: DISCONTINUED | OUTPATIENT
Start: 2024-08-08 | End: 2024-08-08

## 2024-08-08 RX ORDER — OXYCODONE HCL 5 MG/5 ML
5 SOLUTION, ORAL ORAL
Status: COMPLETED | OUTPATIENT
Start: 2024-08-08 | End: 2024-08-08

## 2024-08-08 RX ORDER — HYDROCODONE BITARTRATE AND ACETAMINOPHEN 5; 325 MG/1; MG/1
1 TABLET ORAL EVERY 6 HOURS PRN
Qty: 10 TABLET | Refills: 0 | Status: SHIPPED | OUTPATIENT
Start: 2024-08-08 | End: 2024-08-13

## 2024-08-08 RX ORDER — SODIUM CHLORIDE, SODIUM LACTATE, POTASSIUM CHLORIDE, CALCIUM CHLORIDE 600; 310; 30; 20 MG/100ML; MG/100ML; MG/100ML; MG/100ML
INJECTION, SOLUTION INTRAVENOUS CONTINUOUS
Status: DISCONTINUED | OUTPATIENT
Start: 2024-08-08 | End: 2024-08-08

## 2024-08-08 RX ORDER — HALOPERIDOL 5 MG/ML
1 INJECTION INTRAMUSCULAR
Status: DISCONTINUED | OUTPATIENT
Start: 2024-08-08 | End: 2024-08-08

## 2024-08-08 RX ORDER — SODIUM CHLORIDE 9 MG/ML
INJECTION, SOLUTION INTRAVENOUS CONTINUOUS
Status: DISCONTINUED | OUTPATIENT
Start: 2024-08-08 | End: 2024-08-08 | Stop reason: HOSPADM

## 2024-08-08 RX ORDER — ACETAMINOPHEN 500 MG
1000 TABLET ORAL EVERY 6 HOURS PRN
Status: DISCONTINUED | OUTPATIENT
Start: 2024-08-08 | End: 2024-08-08

## 2024-08-08 RX ORDER — DEXAMETHASONE SODIUM PHOSPHATE 4 MG/ML
INJECTION, SOLUTION INTRA-ARTICULAR; INTRALESIONAL; INTRAMUSCULAR; INTRAVENOUS; SOFT TISSUE PRN
Status: DISCONTINUED | OUTPATIENT
Start: 2024-08-08 | End: 2024-08-08 | Stop reason: SURG

## 2024-08-08 RX ORDER — ALBUTEROL SULFATE 5 MG/ML
2.5 SOLUTION RESPIRATORY (INHALATION)
Status: DISCONTINUED | OUTPATIENT
Start: 2024-08-08 | End: 2024-08-08

## 2024-08-08 RX ORDER — ONDANSETRON 2 MG/ML
4 INJECTION INTRAMUSCULAR; INTRAVENOUS
Status: DISCONTINUED | OUTPATIENT
Start: 2024-08-08 | End: 2024-08-08

## 2024-08-08 RX ORDER — HYDROMORPHONE HYDROCHLORIDE 1 MG/ML
0.2 INJECTION, SOLUTION INTRAMUSCULAR; INTRAVENOUS; SUBCUTANEOUS
Status: DISCONTINUED | OUTPATIENT
Start: 2024-08-08 | End: 2024-08-08

## 2024-08-08 RX ORDER — DIPHENHYDRAMINE HYDROCHLORIDE 50 MG/ML
6.25 INJECTION INTRAMUSCULAR; INTRAVENOUS
Status: DISCONTINUED | OUTPATIENT
Start: 2024-08-08 | End: 2024-08-08

## 2024-08-08 RX ORDER — BUPIVACAINE HYDROCHLORIDE 2.5 MG/ML
INJECTION, SOLUTION EPIDURAL; INFILTRATION; INTRACAUDAL
Status: DISCONTINUED | OUTPATIENT
Start: 2024-08-08 | End: 2024-08-08 | Stop reason: HOSPADM

## 2024-08-08 RX ORDER — EPHEDRINE SULFATE 50 MG/ML
INJECTION, SOLUTION INTRAVENOUS PRN
Status: DISCONTINUED | OUTPATIENT
Start: 2024-08-08 | End: 2024-08-08 | Stop reason: SURG

## 2024-08-08 RX ORDER — EPHEDRINE SULFATE 50 MG/ML
10 INJECTION, SOLUTION INTRAVENOUS
Status: DISCONTINUED | OUTPATIENT
Start: 2024-08-08 | End: 2024-08-08

## 2024-08-08 RX ORDER — OXYCODONE HCL 5 MG/5 ML
10 SOLUTION, ORAL ORAL
Status: COMPLETED | OUTPATIENT
Start: 2024-08-08 | End: 2024-08-08

## 2024-08-08 RX ORDER — LIDOCAINE HYDROCHLORIDE 20 MG/ML
INJECTION, SOLUTION EPIDURAL; INFILTRATION; INTRACAUDAL; PERINEURAL PRN
Status: DISCONTINUED | OUTPATIENT
Start: 2024-08-08 | End: 2024-08-08 | Stop reason: SURG

## 2024-08-08 RX ORDER — ONDANSETRON 2 MG/ML
INJECTION INTRAMUSCULAR; INTRAVENOUS PRN
Status: DISCONTINUED | OUTPATIENT
Start: 2024-08-08 | End: 2024-08-08 | Stop reason: SURG

## 2024-08-08 RX ORDER — MIDAZOLAM HYDROCHLORIDE 1 MG/ML
INJECTION INTRAMUSCULAR; INTRAVENOUS PRN
Status: DISCONTINUED | OUTPATIENT
Start: 2024-08-08 | End: 2024-08-08 | Stop reason: SURG

## 2024-08-08 RX ORDER — HYDROMORPHONE HYDROCHLORIDE 1 MG/ML
0.4 INJECTION, SOLUTION INTRAMUSCULAR; INTRAVENOUS; SUBCUTANEOUS
Status: DISCONTINUED | OUTPATIENT
Start: 2024-08-08 | End: 2024-08-08

## 2024-08-08 RX ORDER — HYDRALAZINE HYDROCHLORIDE 20 MG/ML
5 INJECTION INTRAMUSCULAR; INTRAVENOUS
Status: DISCONTINUED | OUTPATIENT
Start: 2024-08-08 | End: 2024-08-08

## 2024-08-08 RX ORDER — SODIUM CHLORIDE, SODIUM LACTATE, POTASSIUM CHLORIDE, CALCIUM CHLORIDE 600; 310; 30; 20 MG/100ML; MG/100ML; MG/100ML; MG/100ML
INJECTION, SOLUTION INTRAVENOUS CONTINUOUS
Status: DISCONTINUED | OUTPATIENT
Start: 2024-08-08 | End: 2024-08-08 | Stop reason: HOSPADM

## 2024-08-08 RX ADMIN — FENTANYL CITRATE 50 MCG: 50 INJECTION, SOLUTION INTRAMUSCULAR; INTRAVENOUS at 13:40

## 2024-08-08 RX ADMIN — LIDOCAINE HYDROCHLORIDE 50 MG: 20 INJECTION, SOLUTION EPIDURAL; INFILTRATION; INTRACAUDAL at 12:29

## 2024-08-08 RX ADMIN — EPHEDRINE SULFATE 10 MG: 50 INJECTION, SOLUTION INTRAVENOUS at 12:36

## 2024-08-08 RX ADMIN — FENTANYL CITRATE 50 MCG: 50 INJECTION, SOLUTION INTRAMUSCULAR; INTRAVENOUS at 12:29

## 2024-08-08 RX ADMIN — DEXAMETHASONE SODIUM PHOSPHATE 8 MG: 4 INJECTION INTRA-ARTICULAR; INTRALESIONAL; INTRAMUSCULAR; INTRAVENOUS; SOFT TISSUE at 12:31

## 2024-08-08 RX ADMIN — ONDANSETRON 4 MG: 2 INJECTION INTRAMUSCULAR; INTRAVENOUS at 12:40

## 2024-08-08 RX ADMIN — PROPOFOL 100 MCG/KG/MIN: 10 INJECTION, EMULSION INTRAVENOUS at 12:30

## 2024-08-08 RX ADMIN — PROPOFOL 160 MG: 10 INJECTION, EMULSION INTRAVENOUS at 12:29

## 2024-08-08 RX ADMIN — MIDAZOLAM HYDROCHLORIDE 2 MG: 2 INJECTION, SOLUTION INTRAMUSCULAR; INTRAVENOUS at 12:25

## 2024-08-08 RX ADMIN — OXYCODONE HYDROCHLORIDE 10 MG: 5 SOLUTION ORAL at 13:40

## 2024-08-08 RX ADMIN — FENTANYL CITRATE 50 MCG: 50 INJECTION, SOLUTION INTRAMUSCULAR; INTRAVENOUS at 13:45

## 2024-08-08 RX ADMIN — SODIUM CHLORIDE, POTASSIUM CHLORIDE, SODIUM LACTATE AND CALCIUM CHLORIDE: 600; 310; 30; 20 INJECTION, SOLUTION INTRAVENOUS at 09:40

## 2024-08-08 ASSESSMENT — FIBROSIS 4 INDEX: FIB4 SCORE: 0.49

## 2024-08-08 ASSESSMENT — PAIN DESCRIPTION - PAIN TYPE
TYPE: SURGICAL PAIN

## 2024-08-08 NOTE — OR NURSING
Assume care for pt in pre-op. Patient allergies and NPO status verified. Belongings with significant other. Patient verbalizes understanding of pain scale, expected course of stay and plan of care. Surgical procedure verified with patient. IV access established. Call light within reach. No further needs at this time. Hourly rounding in place.

## 2024-08-08 NOTE — DISCHARGE INSTRUCTIONS
HOME CARE INSTRUCTIONS    ACTIVITY: Rest and take it easy for the first 24 hours.  A responsible adult is recommended to remain with you during that time.  It is normal to feel sleepy.  We encourage you to not do anything that requires balance, judgment or coordination.    FOR 24 HOURS DO NOT:  Drive, operate machinery or run household appliances.  Drink beer or alcoholic beverages.  Make important decisions or sign legal documents.      DIET: To avoid nausea, slowly advance diet as tolerated, avoiding spicy or greasy foods for the first day.  Add more substantial food to your diet according to your physician's instructions.  Babies can be fed formula or breast milk as soon as they are hungry.  INCREASE FLUIDS AND FIBER TO AVOID CONSTIPATION.    SURGICAL DRESSING/BATHING: No soaking in any body of water until cleared by doctor. Okay to shower tomorrow. Avoid scrubbing.    MEDICATIONS: Resume taking daily medication.  Take prescribed pain medication with food.  If no medication is prescribed, you may take non-aspirin pain medication if needed.  PAIN MEDICATION CAN BE VERY CONSTIPATING.  Take a stool softener or laxative such as senokot, pericolace, or milk of magnesia if needed.    Prescription given for Norco.  Last pain medication given at 1:40pm.    A follow-up appointment should be arranged with your doctor in 1-2 weeks; call to schedule.    You should CALL YOUR PHYSICIAN if you develop:  Fever greater than 101 degrees F.  Pain not relieved by medication, or persistent nausea or vomiting.  Excessive bleeding (blood soaking through dressing) or unexpected drainage from the wound.  Extreme redness or swelling around the incision site, drainage of pus or foul smelling drainage.  Inability to urinate or empty your bladder within 8 hours.  Problems with breathing or chest pain.    You should call 911 if you develop problems with breathing or chest pain.  If you are unable to contact your doctor or surgical center, you  should go to the nearest emergency room or urgent care center.  Physician's telephone #:    [phone]       MILD FLU-LIKE SYMPTOMS ARE NORMAL.  YOU MAY EXPERIENCE GENERALIZED MUSCLE ACHES, THROAT IRRITATION, HEADACHE AND/OR SOME NAUSEA.    If any questions arise, call your doctor.  If your doctor is not available, please feel free to call the Surgical Center at [phone].  The Center is open Monday through Friday from 7AM to 7PM.      A registered nurse may call you a few days after your surgery to see how you are doing after your procedure.    You may also receive a survey in the mail within the next two weeks and we ask that you take a few moments to complete the survey and return it to us.  Our goal is to provide you with very good care and we value your comments.     Depression / Suicide Risk    As you are discharged from this [facility] facility, it is important to learn how to keep safe from harming yourself.    Recognize the warning signs:  Abrupt changes in personality, positive or negative- including increase in energy   Giving away possessions  Change in eating patterns- significant weight changes-  positive or negative  Change in sleeping patterns- unable to sleep or sleeping all the time   Unwillingness or inability to communicate  Depression  Unusual sadness, discouragement and loneliness  Talk of wanting to die  Neglect of personal appearance   Rebelliousness- reckless behavior  Withdrawal from people/activities they love  Confusion- inability to concentrate     If you or a loved one observes any of these behaviors or has concerns about self-harm, here's what you can do:  Talk about it- your feelings and reasons for harming yourself  Remove any means that you might use to hurt yourself (examples: pills, rope, extension cords, firearm)  Get professional help from the community (Mental Health, Substance Abuse, psychological counseling)  Do not be alone:Call your Safe Contact- someone whom you  trust who will be there for you.  Call your local CRISIS HOTLINE 763-4408 or 308-199-9598  Call your local Children's Mobile Crisis Response Team [place] (193) 793-8062 or www.[employer]  Call the toll free National Suicide Prevention Hotlines   National Suicide Prevention Lifeline 268-859-LLFG (7565)  [facility] 800-GWDEGAK (800-8989)    I acknowledge receipt and understanding of these Home Care instructions.

## 2024-08-08 NOTE — ANESTHESIA POSTPROCEDURE EVALUATION
Patient: Hannah Ruval[place]    Procedure Summary       Date: 08/08/24 Room / Location: [facility] 08 / SURGERY [facility]    Anesthesia Start: 1225 Anesthesia Stop: 1257    Procedure: EXCISE RIGHT AXILLARY MASS (Right: Axilla) Diagnosis: (MASS OF SOFT TISSUE OF RIGHT UPPER LIMB)    Surgeons: Leah Levy M.D. Responsible Provider: Fransisco Schulz M.D.    Anesthesia Type: general ASA Status: 1            Final Anesthesia Type: general  Last vitals  BP   Blood Pressure: 117/70    Temp   36.3 °C (97.3 °F)    Pulse   (!) 53   Resp   16    SpO2   98 %      Anesthesia Post Evaluation    Patient location during evaluation: PACU  Patient participation: complete - patient participated  Level of consciousness: sleepy but conscious    Airway patency: patent  Anesthetic complications: no  Cardiovascular status: hemodynamically stable  Respiratory status: acceptable  Hydration status: euvolemic    PONV: none          No notable events documented.     Nurse Pain Score: 0 (NPRS)

## 2024-08-08 NOTE — ANESTHESIA TIME REPORT
Anesthesia Start and Stop Event Times       Date Time Event    8/8/2024 1219 Ready for Procedure     1225 Anesthesia Start     1257 Anesthesia Stop          Responsible Staff  08/08/24      Name Role Begin End    Fransisco Schulz M.D. Anesth 1225 1257          Overtime Reason:  no overtime (within assigned shift)    Comments:

## 2024-08-08 NOTE — ANESTHESIA PROCEDURE NOTES
Airway    Date/Time: 8/8/2024 12:29 PM    Performed by: Fransisco Schulz M.D.  Authorized by: Fransisco Schulz M.D.    Location:  OR  Urgency:  Elective  Difficult Airway: No    Indications for Airway Management:  Anesthesia      Spontaneous Ventilation: absent    Sedation Level:  Deep  Preoxygenated: Yes    Mask Difficulty Assessment:  0 - not attempted  Final Airway Type:  Supraglottic airway  Final Supraglottic Airway:  Standard LMA    SGA Size:  3  Number of Attempts at Approach:  1

## 2024-08-08 NOTE — OP REPORT
DATE OF SERVICE:  08/08/2024     PREOPERATIVE DIAGNOSIS:  Right axillary lipoma.     POSTOPERATIVE DIAGNOSIS:  Right axillary lipoma.     PROCEDURE:  Excision of a 5 cm right axillary mass.     SURGEON:  Leah Monson MD     ASSISTANT:  MIGUEL ANGEL Mejía     ANESTHESIA:  Laryngeal mask.     ANESTHESIOLOGIST:  Fransisco Schulz MD     INDICATIONS:  The patient is a 29-year-old female, who has an enlarging mass   in her right axilla.  She is being brought at this time for excision. The indications for a surgical assistant in this surgery were indicated due to complexity of the procedure. Their role included aiding in incision, retraction, holding devices and closure of the wound.        FINDINGS:  A 5 cm lipomatous mass removed in its entirety.     DESCRIPTION OF PROCEDURE:  After the patient was identified and consented, she   was brought to the operating room and placed in supine position.  The patient   underwent laryngeal mask anesthetic clearance.  The patient's axilla was   prepped and draped in sterile fashion.  Curvilinear incision was made in the   inferior hairline.  Using electrocautery, subcutaneous tissue was dissected   down.  The mass was excised in its entirety and sent to pathology for   evaluation.  The wound was irrigated and hemostasis acquired.  It was closed   with 3-0 Vicryl subcutaneous layer and skin was closed with 4-0 Vicryl in   subcuticular fashion.  They were anesthetized with 0.25% Marcaine.    Steri-Strip and dry dressing placed on the wound.  The patient was extubated   and taken to recovery room in stable condition.  All sponge and needle counts   were correct.        ______________________________  LEAH MONSON MD    [facility]      DD:  08/08/2024 12:47  DT:  08/08/2024 13:09    Job#:  [id number]    CC:Callie Faria MD

## 2024-08-08 NOTE — OR NURSING
1426 Pt arrives to phase II from PACU. VSS. Pt reports some pain. Dressing to R axillary clean, dry, and intact.      Discharge instructions reviewed with pt and significant other - both verbalize understanding. Copy of instructions sent home with pt.     IV removed. Dressing remains clean, dry, and intact. VSS. Pt dressed independently     Pt wheeled to car with all belongings

## 2024-08-08 NOTE — PROGRESS NOTES
Pharmacy Medication Reconciliation      ~Medication reconciliation updated and complete per patient   ~Allergies have been verified and updated   ~No oral ABX within the last 30 days  ~Patient home pharmacy :  [facility] [place] [phone]      ~Anticoagulants (rivaroxaban, apixaban, edoxaban, dabigatran, warfarin, enoxaparin) taken in the last 14 days? No

## 2024-08-08 NOTE — ANESTHESIA PREPROCEDURE EVALUATION
" Case: [id number] Date/Time: 08/08/24 1248    Procedure: EXCISE RIGHT AXILLARY MASS (Right: Axilla)    Anesthesia type: General    Pre-op diagnosis: MASS OF SOFT TISSUE OF RIGHT UPPER LIMB    Location: [facility] OR 08 / SURGERY [facility]    Surgeons: Leah Levy M.D.          Past Medical History:   Diagnosis Date    Anxiety     Bronchitis     Cancer (HCC)     \"pre cancerous cells on cervix, removed\"    Melanoma (HCC)     PONV (postoperative nausea and vomiting)     Snoring     no sleep study    TMJ disease        Relevant Problems   NEURO   (positive) Acute nonintractable headache      Other   (positive) Generalized anxiety disorder   (positive) Malignant melanoma of right upper limb, including shoulder (HCC)       Physical Exam    Airway   Mallampati: II  TM distance: >3 FB  Neck ROM: full       Cardiovascular - normal exam  Rhythm: regular  Rate: normal  (-) murmur     Dental - normal exam           Pulmonary - normal exam  Breath sounds clear to auscultation     Abdominal    Neurological - normal exam                   Anesthesia Plan    ASA 1       Plan - general       Airway plan will be LMA          Induction: intravenous    Postoperative Plan: Postoperative administration of opioids is intended.    Pertinent diagnostic labs and testing reviewed    Informed Consent:    Anesthetic plan and risks discussed with patient.    Use of blood products discussed with: patient whom consented to blood products.           "

## 2024-08-08 NOTE — OR NURSING
Patient arrived to PACU in stable condition.  Oral airway in place, removed at 1334  Surgical site to R axilla, closed with dermabond and covered with tegaderm  Medicated for pain per JACINTA Moreland updated on patient condition via messaging service  Patient tolerated clears without nausea or vomiting  Report given to Thu RN. Patient transferred to phase 2

## 2024-08-12 ENCOUNTER — HOSPITAL ENCOUNTER (OUTPATIENT)
Facility: MEDICAL CENTER | Age: 29
End: 2024-08-12
Attending: NURSE PRACTITIONER
Payer: COMMERCIAL

## 2024-08-12 ENCOUNTER — GYNECOLOGY VISIT (OUTPATIENT)
Dept: OBGYN | Facility: CLINIC | Age: 29
End: 2024-08-12
Payer: COMMERCIAL

## 2024-08-12 VITALS — DIASTOLIC BLOOD PRESSURE: 81 MMHG | SYSTOLIC BLOOD PRESSURE: 110 MMHG

## 2024-08-12 DIAGNOSIS — Z11.51 SCREENING FOR HPV (HUMAN PAPILLOMAVIRUS): ICD-10-CM

## 2024-08-12 DIAGNOSIS — Z12.4 ENCOUNTER FOR PAPANICOLAOU SMEAR FOR CERVICAL CANCER SCREENING: ICD-10-CM

## 2024-08-12 DIAGNOSIS — Z01.419 WELL WOMAN EXAM WITH ROUTINE GYNECOLOGICAL EXAM: ICD-10-CM

## 2024-08-12 PROCEDURE — 99395 PREV VISIT EST AGE 18-39: CPT | Performed by: NURSE PRACTITIONER

## 2024-08-12 PROCEDURE — 88175 CYTOPATH C/V AUTO FLUID REDO: CPT

## 2024-08-12 PROCEDURE — 3074F SYST BP LT 130 MM HG: CPT | Performed by: NURSE PRACTITIONER

## 2024-08-12 PROCEDURE — 3079F DIAST BP 80-89 MM HG: CPT | Performed by: NURSE PRACTITIONER

## 2024-08-12 NOTE — PROGRESS NOTES
Patient here for annual exam.   Last pap done/results : pt states 2022 WNL   LMP : 08/05/2024 approx  BCM : none  Pt states no concerns  Phone/Pharmacy verified [phone]

## 2024-08-12 NOTE — PROGRESS NOTES
ANNUAL GYNECOLOGY VISIT    Chief Complaint  Gynecologic Exam (Annual exam)      HPI:  Subjective  Hannah Perea is a 29 y.o. female . Patient's last menstrual period was 2024 (approximate).  Patient is a former patient of Dr. Madison.  Patient is accompanied by her .  Patient was last seen on 3/7/24 by RACHEL Cohn for a miscarriage. Using nothing for contraception who presents today for Annual Exam.  The patient and her  are considering conceiving and she is not interested in birth control.  Patient reports a family history of ovarian cancer in her mother at the age of 23.  Patient completed genetic testing on 2023 and BRCA1 and BRCA2 genes were negative.  Patient reports a history of RUE melanoma  and she follows up with dermatology every 3 months.  Patient also had a right axillary mass excised on 2024 by Dr. Levy from which she is still recovering.  She states she did have some nausea but relates it to likely hydrocodone she was prescribed for her after surgery.  She otherwise denies any new health concerns today.      Preventive Care   Immunization History   Administered Date(s) Administered    DTP/HIB Combined Vaccine - HISTORICAL DATA 1995, 1995, 1995    Dtap Vaccine 1996, 1999    HPV Quadrivalent Vaccine (GARDASIL) - HISTORICAL DATA 2013, 10/08/2014    Hepatitis B Vaccine Adolescent/Pediatric 1995, 1995, 1995    Hib Vaccine (Prp-d) - HISTORICAL DATA 1995, 1995, 1995, 1996    IPV 1999    Influenza Vaccine Quad Inj (Pf) 10/08/2014    MMR Vaccine 1996, 1999, 2017    Meningococcal Conjugate Vaccine MCV4 (Menactra) 2013    OPV TRIVALENT - HISTORICAL DATA (GIVEN PRIOR TO MAY 2016) 1995, 1995, 1995    Tdap Vaccine 2010, 2017    Varicella Vaccine Live 2001, 2013       Gardasil HPV vaccine: UTD    Gynecology  "History and ROS  Current Sexual Activity: yes - male   History of sexually transmitted diseases? yes - Chlamydia and gonorrhea in 2020, HPV   Abnormal discharge? no  Current Contraception:  Nothing    Menstrual History  Patient's last menstrual period was 2024 (approximate).  Periods are regular  q 28-30 days, lasting 3 days.   Clots or heavy flow: no  Dysmenorrhea: yes - mild   Intermenstrual bleeding/spotting: no  Significant pain with periods:no  Bothersome PMS symptoms: yes - emotional, lord.   Significant Pelvic Pain: no    Pap History  Last pap smear:  or  with Dr. Madison per patient normal. 18: cytology neg, + HPV other high risk,  History of moderate or severe dysplasia: Unknown, but had precancerous lesions on cervix that were removed in  by Dr. Madison.     Cancer Risk Assessement:  Family history of:   - Breast cancer: no   - Ovarian cancer: yes, mother at age 23 \"precancerous cells on ovaries\" per patient.  Patient's BRCA1 and BRCA2 genes are negative.   - Uterine cancer: no   - Colon cancer: no    Obstetric History  OB History    Para Term  AB Living   2 1   1 1 1   SAB IAB Ectopic Molar Multiple Live Births   1         1      # Outcome Date GA Lbr Jose/2nd Weight Sex Delivery Anes PTL Lv   2   34w0d  5 lb 5 oz M Vag-Spont  Y PAULY   1 SAB  8w0d    SAB          Past Medical History  Past Medical History:   Diagnosis Date    Anxiety     Bronchitis     Cancer (HCC)     \"pre cancerous cells on cervix, removed\"    Melanoma (HCC)     PONV (postoperative nausea and vomiting)     Snoring     no sleep study    TMJ disease        Past Surgical History  Past Surgical History:   Procedure Laterality Date    AXILLARY NODE DISSECTION Right 2024    Procedure: EXCISE RIGHT AXILLARY MASS;  Surgeon: Leah Levy M.D.;  Location: SURGERY [facility];  Service: General    OR BX/REMV,LYMPH NODE,DEEP AXILL Right 2022    Procedure: BIOPSY, LYMPH NODE, SENTINEL;  " Surgeon: George Bess M.D.;  Location: SURGERY SAME DAY [facility];  Service: General    WIDE EXCISION, LESION, UPPER EXTREMITY Right 7/28/2022    Procedure: RADICAL WIDE EXCISION OF MALIGNANT MELANOMA OF RIGHT UPPER ARM; RIGHT AXILLARY SENTINEL NODE BIOPSY;  Surgeon: George Bess M.D.;  Location: SURGERY SAME DAY [facility];  Service: General    COLONOSCOPY - ENDO         Social History  Social History     Tobacco Use    Smoking status: Never    Smokeless tobacco: Never   Vaping Use    Vaping status: Never Used   Substance Use Topics    Alcohol use: Yes     Comment: rare    Drug use: Not Currently     Types: Marijuana, Oral        Family History  Family History   Problem Relation Age of Onset    Ovarian Cancer Mother     Lupus Mother     Depression Maternal Aunt     Ovarian Cancer Maternal Grandmother     Hypertension Maternal Grandmother     Stroke Maternal Grandmother     Tubal Cancer Neg Hx     Peritoneal Cancer Neg Hx     Colorectal Cancer Neg Hx     Breast Cancer Neg Hx        Home Medications  Current Outpatient Medications   Medication Sig    HYDROcodone-acetaminophen (NORCO) 5-325 MG Tab per tablet Take 1 Tablet by mouth every 6 hours as needed (moderate pan) for up to 5 days. (Patient not taking: Reported on 8/12/2024)       Allergies/Reactions  No Known Allergies    ROS  Gen: no fevers or chills, no significant weight loss or gain, excessive fatigue  Respiratory:  no cough or dyspnea  Cardiac:  no chest pain, no palpitations, no syncope  Breast: no breast discharge, pain, lump or skin changes  GI:  no heartburn, no abdominal pain, no nausea or vomiting  Urinary: no dysuria, urgency, frequency, incontinence   Psych: no depression, + anxiety  Neuro: no migraines with aura, fainting spells, numbness or tingling  Extremities: no joint pain, persistently swollen ankles, recurrent leg cramps      Physical Examination:  Vital Signs: /81 (BP Location: Left arm, Patient Position: Sitting, BP Cuff Size:  Adult)   LMP 08/05/2024 (Approximate)       Constitutional: The patient is well developed and well nourished.  Psychiatric: Patient is oriented to time place and person.   Skin: No rash observed.  Neck: Appears symmetric. Thyroid normal size  Respiratory: normal effort, no rales, rhonchi or wheezes bilaterally. Clear to ausculation bilaterally.   Heart auscultation: no murmur rub or gallop, RRR  Breast: Inspection reveals no asymetry or nipple discharge, no skin thickening, dimpling or erythema.  Palpation demonstrates no masses. Right axilla noted some ecchymosis and well healing incision, intact and without discharge.   Abdomen: Soft, non-tender. BS x 4.   Pelvic Exam:      Vulva: external female genitalia are normal in appearance. No lesions     Urethra - no lesions, no erythema     Vagina: moist, pink, normal ruggae     Cervix: pink, smooth, no lesions, no CMT,      Uterus - non-tender, normal size, shape, contour, mobile, anteverted to the left     Ovaries: non-tender, no appreciable masses    Pap Smear performed: Yes    Chaperone Present: Priti Xavier MA  Extremeties: Legs are symmetric and without tenderness. There is no edema present.        Assessment & Plan  Hannah Perea is a 29 y.o. female who presents today for Annual Gyn Exam. See HPI for additional details.       1. Well woman exam with routine gynecological exam  Encouraged adequate water intake, healthy diet, regular exercise. Educated on Pap smear screening and guidelines for age per ACOG and ASCCP. Discussed safe sex, contraception/family planning. Self breast exam taught.     2. Encounter for Papanicolaou smear for cervical cancer screening  - THINPREP PAP, REFLEX HPV ON ASC-US AND ABOVE; Future  3. Screening for HPV (human papillomavirus)  - THINPREP PAP, REFLEX HPV ON ASC-US AND ABOVE; Future    I will follow up with patient once results are available and guide treatment if indicated per ASCCP guidelines.        Return:  Annually or PRN    RAY Levine.  8/12/2024    This dictation was created using voice recognition software. The accuracy of the dictation is limited to the abilities of the software. I expect there may be some errors of grammar and possibly content.

## 2024-08-15 LAB
COMMENT NL11729A: NORMAL
CYTOLOGIST CVX/VAG CYTO: NORMAL
CYTOLOGY CVX/VAG DOC CYTO: NORMAL
CYTOLOGY CVX/VAG DOC THIN PREP: NORMAL
NOTE NL11727A: NORMAL
OTHER STN SPEC: NORMAL
STAT OF ADQ CVX/VAG CYTO-IMP: NORMAL

## 2024-11-20 ENCOUNTER — HOSPITAL ENCOUNTER (EMERGENCY)
Facility: MEDICAL CENTER | Age: 29
End: 2024-11-20
Attending: EMERGENCY MEDICINE
Payer: COMMERCIAL

## 2024-11-20 ENCOUNTER — APPOINTMENT (OUTPATIENT)
Dept: RADIOLOGY | Facility: MEDICAL CENTER | Age: 29
End: 2024-11-20
Attending: EMERGENCY MEDICINE
Payer: COMMERCIAL

## 2024-11-20 VITALS
WEIGHT: 129.63 LBS | DIASTOLIC BLOOD PRESSURE: 66 MMHG | SYSTOLIC BLOOD PRESSURE: 121 MMHG | BODY MASS INDEX: 21.57 KG/M2 | HEART RATE: 97 BPM | TEMPERATURE: 97 F | RESPIRATION RATE: 16 BRPM | OXYGEN SATURATION: 99 %

## 2024-11-20 DIAGNOSIS — R11.2 NAUSEA AND VOMITING, UNSPECIFIED VOMITING TYPE: ICD-10-CM

## 2024-11-20 DIAGNOSIS — Z3A.12 12 WEEKS GESTATION OF PREGNANCY: ICD-10-CM

## 2024-11-20 LAB
ALBUMIN SERPL BCP-MCNC: 4.2 G/DL (ref 3.2–4.9)
ALBUMIN/GLOB SERPL: 1.1 G/DL
ALP SERPL-CCNC: 69 U/L (ref 30–99)
ALT SERPL-CCNC: 49 U/L (ref 2–50)
ANION GAP SERPL CALC-SCNC: 14 MMOL/L (ref 7–16)
APPEARANCE UR: CLEAR
AST SERPL-CCNC: 40 U/L (ref 12–45)
B-HCG SERPL-ACNC: ABNORMAL MIU/ML (ref 0–5)
BACTERIA #/AREA URNS HPF: ABNORMAL /HPF
BASOPHILS # BLD AUTO: 0.2 % (ref 0–1.8)
BASOPHILS # BLD: 0.02 K/UL (ref 0–0.12)
BILIRUB SERPL-MCNC: 0.4 MG/DL (ref 0.1–1.5)
BILIRUB UR QL STRIP.AUTO: NEGATIVE
BUN SERPL-MCNC: 12 MG/DL (ref 8–22)
CALCIUM ALBUM COR SERPL-MCNC: 9.3 MG/DL (ref 8.5–10.5)
CALCIUM SERPL-MCNC: 9.5 MG/DL (ref 8.5–10.5)
CASTS URNS QL MICRO: ABNORMAL /LPF (ref 0–2)
CHLORIDE SERPL-SCNC: 104 MMOL/L (ref 96–112)
CO2 SERPL-SCNC: 21 MMOL/L (ref 20–33)
COLOR UR: ABNORMAL
CREAT SERPL-MCNC: 0.6 MG/DL (ref 0.5–1.4)
EOSINOPHIL # BLD AUTO: 0.01 K/UL (ref 0–0.51)
EOSINOPHIL NFR BLD: 0.1 % (ref 0–6.9)
EPITHELIAL CELLS 1715: ABNORMAL /HPF (ref 0–5)
ERYTHROCYTE [DISTWIDTH] IN BLOOD BY AUTOMATED COUNT: 38.4 FL (ref 35.9–50)
GFR SERPLBLD CREATININE-BSD FMLA CKD-EPI: 124 ML/MIN/1.73 M 2
GLOBULIN SER CALC-MCNC: 3.7 G/DL (ref 1.9–3.5)
GLUCOSE SERPL-MCNC: 98 MG/DL (ref 65–99)
GLUCOSE UR STRIP.AUTO-MCNC: NEGATIVE MG/DL
HCT VFR BLD AUTO: 39 % (ref 37–47)
HGB BLD-MCNC: 13.6 G/DL (ref 12–16)
HIV 1+2 AB+HIV1 P24 AG SERPL QL IA: NORMAL
IMM GRANULOCYTES # BLD AUTO: 0.05 K/UL (ref 0–0.11)
IMM GRANULOCYTES NFR BLD AUTO: 0.6 % (ref 0–0.9)
KETONES UR STRIP.AUTO-MCNC: >=160 MG/DL
LEUKOCYTE ESTERASE UR QL STRIP.AUTO: NEGATIVE
LIPASE SERPL-CCNC: 22 U/L (ref 11–82)
LYMPHOCYTES # BLD AUTO: 0.57 K/UL (ref 1–4.8)
LYMPHOCYTES NFR BLD: 7.1 % (ref 22–41)
MCH RBC QN AUTO: 30 PG (ref 27–33)
MCHC RBC AUTO-ENTMCNC: 34.9 G/DL (ref 32.2–35.5)
MCV RBC AUTO: 85.9 FL (ref 81.4–97.8)
MICRO URNS: ABNORMAL
MONOCYTES # BLD AUTO: 0.36 K/UL (ref 0–0.85)
MONOCYTES NFR BLD AUTO: 4.5 % (ref 0–13.4)
NEUTROPHILS # BLD AUTO: 7 K/UL (ref 1.82–7.42)
NEUTROPHILS NFR BLD: 87.5 % (ref 44–72)
NITRITE UR QL STRIP.AUTO: NEGATIVE
NRBC # BLD AUTO: 0 K/UL
NRBC BLD-RTO: 0 /100 WBC (ref 0–0.2)
PH UR STRIP.AUTO: 6 [PH] (ref 5–8)
PLATELET # BLD AUTO: 334 K/UL (ref 164–446)
PMV BLD AUTO: 10.2 FL (ref 9–12.9)
POTASSIUM SERPL-SCNC: 3.5 MMOL/L (ref 3.6–5.5)
PROT SERPL-MCNC: 7.9 G/DL (ref 6–8.2)
PROT UR QL STRIP: 30 MG/DL
RBC # BLD AUTO: 4.54 M/UL (ref 4.2–5.4)
RBC # URNS HPF: ABNORMAL /HPF (ref 0–2)
RBC UR QL AUTO: NEGATIVE
SODIUM SERPL-SCNC: 139 MMOL/L (ref 135–145)
SP GR UR STRIP.AUTO: 1.03
T PALLIDUM AB SER QL IA: NORMAL
UROBILINOGEN UR STRIP.AUTO-MCNC: 1 EU/DL
WBC # BLD AUTO: 8 K/UL (ref 4.8–10.8)
WBC #/AREA URNS HPF: ABNORMAL /HPF

## 2024-11-20 PROCEDURE — 84702 CHORIONIC GONADOTROPIN TEST: CPT

## 2024-11-20 PROCEDURE — 76817 TRANSVAGINAL US OBSTETRIC: CPT

## 2024-11-20 PROCEDURE — 99284 EMERGENCY DEPT VISIT MOD MDM: CPT

## 2024-11-20 PROCEDURE — 83690 ASSAY OF LIPASE: CPT

## 2024-11-20 PROCEDURE — 87389 HIV-1 AG W/HIV-1&-2 AB AG IA: CPT

## 2024-11-20 PROCEDURE — 86780 TREPONEMA PALLIDUM: CPT

## 2024-11-20 PROCEDURE — 96374 THER/PROPH/DIAG INJ IV PUSH: CPT

## 2024-11-20 PROCEDURE — 85025 COMPLETE CBC W/AUTO DIFF WBC: CPT

## 2024-11-20 PROCEDURE — 81001 URINALYSIS AUTO W/SCOPE: CPT

## 2024-11-20 PROCEDURE — 80053 COMPREHEN METABOLIC PANEL: CPT

## 2024-11-20 PROCEDURE — 700111 HCHG RX REV CODE 636 W/ 250 OVERRIDE (IP): Mod: JZ | Performed by: EMERGENCY MEDICINE

## 2024-11-20 PROCEDURE — 36415 COLL VENOUS BLD VENIPUNCTURE: CPT

## 2024-11-20 RX ORDER — ONDANSETRON 2 MG/ML
4 INJECTION INTRAMUSCULAR; INTRAVENOUS ONCE
Status: COMPLETED | OUTPATIENT
Start: 2024-11-20 | End: 2024-11-20

## 2024-11-20 RX ORDER — ONDANSETRON 4 MG/1
4 TABLET, ORALLY DISINTEGRATING ORAL EVERY 6 HOURS PRN
Qty: 10 TABLET | Refills: 0 | Status: SHIPPED | OUTPATIENT
Start: 2024-11-20

## 2024-11-20 RX ADMIN — ONDANSETRON 4 MG: 2 INJECTION INTRAMUSCULAR; INTRAVENOUS at 14:38

## 2024-11-20 ASSESSMENT — FIBROSIS 4 INDEX: FIB4 SCORE: 0.49

## 2024-11-20 NOTE — ED PROVIDER NOTES
ED Provider Note    CHIEF COMPLAINT  Chief Complaint   Patient presents with    N/V    Pregnancy     10-12 weeks       HPI/ROS    OUTSIDE HISTORIAN(S):  Mother stated that the patient had a missed  previously and they are concerned about the same possibility today.    Hannah Perea is a 29 y.o. female who presents with complaint of being approximately 12 weeks pregnant and having nausea and vomiting.  She has not been keeping female for last 24 hours has had profound vomiting.  She has been slight abdominal discomfort and cramping in her pelvic region bilaterally.  Denies significant discharge, vaginal bleeding, fever, hematochezia, melena, hematemesis.  Her last menstrual cycle was 2024.  She is a G3, P1 female with a fetal demise and missed  in 2024.  She did have OB/GYN appointment today but she missed her appointment and they would not see her so she came to the emergency department.    PAST MEDICAL HISTORY   has a past medical history of Anxiety, Bronchitis, Cancer (HCC), Melanoma (HCC), PONV (postoperative nausea and vomiting), Snoring, and TMJ disease.    SURGICAL HISTORY   has a past surgical history that includes colonoscopy - endo; bx/remv,lymph node,deep axill (Right, 2022); wide excision, lesion, upper extremity (Right, 2022); and axillary node dissection (Right, 2024).    FAMILY HISTORY  Family History   Problem Relation Age of Onset    Ovarian Cancer Mother     Lupus Mother     Depression Maternal Aunt     Ovarian Cancer Maternal Grandmother     Hypertension Maternal Grandmother     Stroke Maternal Grandmother     Tubal Cancer Neg Hx     Peritoneal Cancer Neg Hx     Colorectal Cancer Neg Hx     Breast Cancer Neg Hx        SOCIAL HISTORY  Social History     Tobacco Use    Smoking status: Never    Smokeless tobacco: Never   Vaping Use    Vaping status: Never Used   Substance and Sexual Activity    Alcohol use: Yes     Comment: rare     Drug use: Not Currently     Types: Marijuana, Oral    Sexual activity: Not on file       CURRENT MEDICATIONS  Home Medications       Reviewed by Debbi An R.N. (Registered Nurse) on 11/20/24 at 1325  Med List Status: Partial     Medication Last Dose Status        Patient Rene Taking any Medications                         Audit from Redirected Encounters    **Home medications have not yet been reviewed for this encounter**         ALLERGIES  No Known Allergies    PHYSICAL EXAM  VITAL SIGNS: /66   Pulse 97   Temp 36.1 °C (97 °F) (Temporal)   Resp 16   Wt 58.8 kg (129 lb 10.1 oz)   SpO2 99%   BMI 21.57 kg/m²      Nursing notes and vitals reviewed.  Constitutional: Well developed, Well nourished, No acute distress, Non-toxic appearance.   Eyes: PERRLA, EOMI, Conjunctiva normal, No discharge.   HENT: Normocephalic, Atraumatic, moist mucous membranes, no facial edema   Cardiovascular: Normal heart rate, Normal rhythm, No murmurs, No rubs, No gallops.   Thorax & Lungs: No respiratory distress, No rales, No rhonchi, No wheezing, No chest tenderness.   Abdomen: Bowel sounds normal, Soft, slight diffuse abdominal tenderness, slight tenderness in the bilateral pelvic region, no guarding, rebound, no McBurney point tenderness, no Mendez sign   skin: Warm, Dry, No erythema, No rash.     EKG/LABS  Normal sinus rhythm on monitor  I have independently interpreted this EKG  Results for orders placed or performed during the hospital encounter of 11/20/24   CBC WITH DIFFERENTIAL    Collection Time: 11/20/24  2:35 PM   Result Value Ref Range    WBC 8.0 4.8 - 10.8 K/uL    RBC 4.54 4.20 - 5.40 M/uL    Hemoglobin 13.6 12.0 - 16.0 g/dL    Hematocrit 39.0 37.0 - 47.0 %    MCV 85.9 81.4 - 97.8 fL    MCH 30.0 27.0 - 33.0 pg    MCHC 34.9 32.2 - 35.5 g/dL    RDW 38.4 35.9 - 50.0 fL    Platelet Count 334 164 - 446 K/uL    MPV 10.2 9.0 - 12.9 fL    Neutrophils-Polys 87.50 (H) 44.00 - 72.00 %    Lymphocytes 7.10 (L) 22.00  - 41.00 %    Monocytes 4.50 0.00 - 13.40 %    Eosinophils 0.10 0.00 - 6.90 %    Basophils 0.20 0.00 - 1.80 %    Immature Granulocytes 0.60 0.00 - 0.90 %    Nucleated RBC 0.00 0.00 - 0.20 /100 WBC    Neutrophils (Absolute) 7.00 1.82 - 7.42 K/uL    Lymphs (Absolute) 0.57 (L) 1.00 - 4.80 K/uL    Monos (Absolute) 0.36 0.00 - 0.85 K/uL    Eos (Absolute) 0.01 0.00 - 0.51 K/uL    Baso (Absolute) 0.02 0.00 - 0.12 K/uL    Immature Granulocytes (abs) 0.05 0.00 - 0.11 K/uL    NRBC (Absolute) 0.00 K/uL   COMP METABOLIC PANEL    Collection Time: 11/20/24  2:35 PM   Result Value Ref Range    Sodium 139 135 - 145 mmol/L    Potassium 3.5 (L) 3.6 - 5.5 mmol/L    Chloride 104 96 - 112 mmol/L    Co2 21 20 - 33 mmol/L    Anion Gap 14.0 7.0 - 16.0    Glucose 98 65 - 99 mg/dL    Bun 12 8 - 22 mg/dL    Creatinine 0.60 0.50 - 1.40 mg/dL    Calcium 9.5 8.5 - 10.5 mg/dL    Correct Calcium 9.3 8.5 - 10.5 mg/dL    AST(SGOT) 40 12 - 45 U/L    ALT(SGPT) 49 2 - 50 U/L    Alkaline Phosphatase 69 30 - 99 U/L    Total Bilirubin 0.4 0.1 - 1.5 mg/dL    Albumin 4.2 3.2 - 4.9 g/dL    Total Protein 7.9 6.0 - 8.2 g/dL    Globulin 3.7 (H) 1.9 - 3.5 g/dL    A-G Ratio 1.1 g/dL   LIPASE    Collection Time: 11/20/24  2:35 PM   Result Value Ref Range    Lipase 22 11 - 82 U/L   HCG QUANTITATIVE SERUM    Collection Time: 11/20/24  2:35 PM   Result Value Ref Range    Bhcg 140965.0 (H) 0.0 - 5.0 mIU/mL   HIV AG/AB COMBO ASSAY SCREENING    Collection Time: 11/20/24  2:35 PM   Result Value Ref Range    HIV Ag/Ab Combo Assay Non-Reactive Non Reactive   T.PALLIDUM AB LIZA (SCREENING)    Collection Time: 11/20/24  2:35 PM   Result Value Ref Range    Syphilis, Treponemal Qual Non-Reactive Non-Reactive   ESTIMATED GFR    Collection Time: 11/20/24  2:35 PM   Result Value Ref Range    GFR (CKD-EPI) 124 >60 mL/min/1.73 m 2   URINALYSIS    Collection Time: 11/20/24  4:10 PM    Specimen: Urine   Result Value Ref Range    Color Dark Yellow     Character Clear     Specific  Gravity 1.031 <1.035    Ph 6.0 5.0 - 8.0    Glucose Negative Negative mg/dL    Ketones >=160 Negative mg/dL    Protein 30 (A) Negative mg/dL    Bilirubin Negative Negative    Urobilinogen, Urine 1.0 <=1.0 EU/dL    Nitrite Negative Negative    Leukocyte Esterase Negative Negative    Occult Blood Negative Negative    Micro Urine Req Microscopic    URINE MICROSCOPIC (W/UA)    Collection Time: 24  4:10 PM   Result Value Ref Range    WBC 0-2 /hpf    RBC 0-2 0 - 2 /hpf    Bacteria Few (A) None /hpf    Epithelial Cells 6-10 (A) 0 - 5 /hpf    Urine Casts 0-2 0 - 2 /lpf       RADIOLOGY/PROCEDURES   I have independently interpreted the diagnostic imaging associated with this visit and am waiting the final reading from the radiologist.   My preliminary interpretation is as follows: Intrauterine pregnancy seen on ultrasound    Radiologist interpretation:  US-OB 1ST TRIMESTER WITH TRANSVAGINAL (COMBO)   Final Result      1.  Single living intrauterine pregnancy at 12 weeks, 3 days estimated gestational age. Positive cardiac activity.   2.  RASTA 2025          COURSE & MEDICAL DECISION MAKING    ASSESSMENT, COURSE AND PLAN  Care Narrative: This is a [facility] 20-year-old female presents with nausea vomiting, interim pregnancy.  She was concerned about her pregnancy as she had a fetal demise with missed  before.  Here the patient has an intrauterine pregnancy at 12 weeks and 3 days, heart rate 170, no evidence of chorionic no subchorionic hemorrhage, or significant pathology.  Laboratory reveals no evidence of significant leukocytosis, she is afebrile she has infection.  Patient received IV Zofran x 2, she is positive p.o.  I do not believe patient requires hospitalization.  I did prescribe her Zofran for outpatient management and she has an appointment for [facility] pregnancy [place] for outpatient evaluation and.  Strict return precautions have been given for pain, vaginal bleeding to return to the emergency  department.  Urinalysis shows only few bacteria with epithelial cells negative nitrate therefore I am not between the patient and urinary tract infection.    Prior to discharge, she has soft, nontender, nonsurgical abdomen, positive p.o.        ADDITIONAL PROBLEMS MANAGED  Previous missed     DISPOSITION AND DISCUSSIONS      FINAL DIAGNOSIS  1. Nausea and vomiting, unspecified vomiting type    2. 12 weeks gestation of pregnancy         Electronically signed by: Jonathon Jarquin D.O., 2024 2:23 PM

## 2024-11-20 NOTE — ED NOTES
IV access placed. Blood drawn and sent to lab. Medicated patient per MAR. Patient denies further needs. Call light within reach. Family at bedside.

## 2024-11-20 NOTE — ED NOTES
Patient to room from lobby with steady gait. Agree with triage note. Charted for ERP. Call light within reach.

## 2024-11-20 NOTE — ED TRIAGE NOTES
Chief Complaint   Patient presents with    N/V    Pregnancy     10-12 weeks     Pt reports that she has not had any prenatal care yet.  1st appointment tomorrow. Pt .    Reports that she is unable to keep anything down x 24 hours.  /72   Pulse (!) 103   Temp 36.1 °C (96.9 °F) (Temporal)   Resp 16   Wt 58.8 kg (129 lb 10.1 oz)   SpO2 100%   Pt informed of wait times. Educated on triage process.  Asked to return to triage RN for any new or worsening of symptoms. Thanked for patience.

## 2024-11-21 ENCOUNTER — INITIAL PRENATAL (OUTPATIENT)
Dept: OBGYN | Facility: CLINIC | Age: 29
End: 2024-11-21
Payer: COMMERCIAL

## 2024-11-21 ENCOUNTER — HOSPITAL ENCOUNTER (OUTPATIENT)
Facility: MEDICAL CENTER | Age: 29
End: 2024-11-21
Attending: OBSTETRICS & GYNECOLOGY
Payer: COMMERCIAL

## 2024-11-21 VITALS — DIASTOLIC BLOOD PRESSURE: 63 MMHG | BODY MASS INDEX: 21.72 KG/M2 | WEIGHT: 130.5 LBS | SYSTOLIC BLOOD PRESSURE: 96 MMHG

## 2024-11-21 DIAGNOSIS — Z82.79 FAMILY HISTORY OF OSTEOGENESIS IMPERFECTA: ICD-10-CM

## 2024-11-21 DIAGNOSIS — O09.899 H/O PRETERM DELIVERY, CURRENTLY PREGNANT: ICD-10-CM

## 2024-11-21 DIAGNOSIS — R11.10 HYPEREMESIS: ICD-10-CM

## 2024-11-21 DIAGNOSIS — Z34.90 PREGNANCY, UNSPECIFIED GESTATIONAL AGE: ICD-10-CM

## 2024-11-21 PROCEDURE — 87591 N.GONORRHOEAE DNA AMP PROB: CPT

## 2024-11-21 PROCEDURE — 87491 CHLMYD TRACH DNA AMP PROBE: CPT

## 2024-11-21 PROCEDURE — 3078F DIAST BP <80 MM HG: CPT | Performed by: OBSTETRICS & GYNECOLOGY

## 2024-11-21 PROCEDURE — 0500F INITIAL PRENATAL CARE VISIT: CPT | Performed by: OBSTETRICS & GYNECOLOGY

## 2024-11-21 PROCEDURE — 3074F SYST BP LT 130 MM HG: CPT | Performed by: OBSTETRICS & GYNECOLOGY

## 2024-11-21 RX ORDER — ONDANSETRON 4 MG/1
4 TABLET, FILM COATED ORAL EVERY 4 HOURS PRN
Qty: 20 TABLET | Refills: 3 | Status: SHIPPED | OUTPATIENT
Start: 2024-11-21

## 2024-11-21 RX ORDER — PROMETHAZINE HYDROCHLORIDE 25 MG/1
25 SUPPOSITORY RECTAL EVERY 6 HOURS PRN
Qty: 10 SUPPOSITORY | Refills: 0 | Status: SHIPPED | OUTPATIENT
Start: 2024-11-21

## 2024-11-21 RX ORDER — METOCLOPRAMIDE 5 MG/1
5 TABLET ORAL 4 TIMES DAILY
Qty: 20 TABLET | Refills: 3 | Status: SHIPPED | OUTPATIENT
Start: 2024-11-21

## 2024-11-21 ASSESSMENT — EDINBURGH POSTNATAL DEPRESSION SCALE (EPDS)
THE THOUGHT OF HARMING MYSELF HAS OCCURRED TO ME: NEVER
THINGS HAVE BEEN GETTING ON TOP OF ME: NO, MOST OF THE TIME I HAVE COPED QUITE WELL
I HAVE FELT SAD OR MISERABLE: NOT VERY OFTEN
I HAVE BEEN ANXIOUS OR WORRIED FOR NO GOOD REASON: YES, SOMETIMES
TOTAL SCORE: 9
I HAVE FELT SCARED OR PANICKY FOR NO GOOD REASON: NO, NOT MUCH
I HAVE LOOKED FORWARD WITH ENJOYMENT TO THINGS: AS MUCH AS I EVER DID
I HAVE BEEN SO UNHAPPY THAT I HAVE HAD DIFFICULTY SLEEPING: NOT AT ALL
I HAVE BEEN SO UNHAPPY THAT I HAVE BEEN CRYING: ONLY OCCASIONALLY
I HAVE BLAMED MYSELF UNNECESSARILY WHEN THINGS WENT WRONG: YES, SOME OF THE TIME
I HAVE BEEN ABLE TO LAUGH AND SEE THE FUNNY SIDE OF THINGS: NOT QUITE SO MUCH NOW

## 2024-11-21 ASSESSMENT — FIBROSIS 4 INDEX: FIB4 SCORE: 0.5

## 2024-11-21 NOTE — PROGRESS NOTES
"Establish Pregnancy Visit    CC: First OB Visit    HPI: Patient is a 29 y.o.  at 11w5d who presents for her first OB visit.  She is not yet feeling fetal movement. She denies vaginal bleeding, reports nausea, reports vomiting. Her N/V was so severe yesterday that she missed her appointment due to being in the ED. At the ED visit, a dating ultrasound was performed. She was prescribed zofran there. Today education regarding HG was provided and refills of the zofran and additional prescriptions were sent to avoid another ED visit. She denies headaches, or urinary symptoms.      Family history is significant of osteogenesis imperfecta. Her grandmother is a carrier and her aunt was affected and  at 7 months old. Her two siblings are carriers. She has not had carrier testing. We discussed that her risk of being a carrier is 50/50 and genetic testing was recommended. She also has not been screened for CF/SMA. It was discovered that a special genetics kit is required for the OI testing so this will be deferred until the MFM consult. She declines a hemoglobin electrophoresis.     She has a history a 36 week  vaginal delivery due to PROM.     DATING:     Performed in ED. Dated by LMP = to 12w3d ultrasound. EDC 2025    GYN HX:   Last Pap: 2024  Hx Moderate or Severe Dysplasia : no  Hx STD : no    OBSTETRIC HISTORY:  OB History    Para Term  AB Living   3 1   1 1 1   SAB IAB Ectopic Molar Multiple Live Births   1         1      # Outcome Date GA Lbr Jose/2nd Weight Sex Type Anes PTL Lv   3 Current            2  2017 34w0d  5 lb 5 oz M Vag-Spont  Y PAULY   1 SAB  8w0d    SAB          MEDICAL HISTORY:  Past Medical History:   Diagnosis Date    Anxiety     Bronchitis     Cancer (HCC)     \"pre cancerous cells on cervix, removed\"    Melanoma (HCC)     PONV (postoperative nausea and vomiting)     Snoring     no sleep study    TMJ disease        MEDICATIONS:  Current Outpatient Medications " on File Prior to Visit   Medication Sig Dispense Refill    ondansetron (ZOFRAN ODT) 4 MG TABLET DISPERSIBLE Take 1 Tablet by mouth every 6 hours as needed for Nausea/Vomiting. 10 Tablet 0     No current facility-administered medications on file prior to visit.       FAMILY HISTORY:  Family History   Problem Relation Age of Onset    Ovarian Cancer Mother     Lupus Mother     Depression Maternal Aunt     Ovarian Cancer Maternal Grandmother     Hypertension Maternal Grandmother     Stroke Maternal Grandmother     Tubal Cancer Neg Hx     Peritoneal Cancer Neg Hx     Colorectal Cancer Neg Hx     Breast Cancer Neg Hx        SURGICAL HISTORY:  Past Surgical History:   Procedure Laterality Date    AXILLARY NODE DISSECTION Right 8/8/2024    Procedure: EXCISE RIGHT AXILLARY MASS;  Surgeon: Leah Levy M.D.;  Location: SURGERY [facility];  Service: General    RI BX/REMV,LYMPH NODE,DEEP AXILL Right 7/28/2022    Procedure: BIOPSY, LYMPH NODE, SENTINEL;  Surgeon: George Bess M.D.;  Location: SURGERY SAME DAY [facility];  Service: General    WIDE EXCISION, LESION, UPPER EXTREMITY Right 7/28/2022    Procedure: RADICAL WIDE EXCISION OF MALIGNANT MELANOMA OF RIGHT UPPER ARM; RIGHT AXILLARY SENTINEL NODE BIOPSY;  Surgeon: George Bess M.D.;  Location: SURGERY SAME DAY [facility];  Service: General    COLONOSCOPY - ENDO         ALLERGIES / REACTIONS:  No Known Allergies             SOCIAL HISTORY:   reports that she has never smoked. She has never used smokeless tobacco. She reports current alcohol use. She reports that she does not currently use drugs after having used the following drugs: Marijuana and Oral.    ROS:   Gen: no fevers or chills, no significant weight loss or gain, excessive fatigue  Respiratory:  no cough or dyspnea  Cardiac:  no chest pain, no palpitations, no syncope  Breast: no breast discharge, pain, lump or skin changes  GI:  no heartburn, no abdominal pain, no nausea or vomiting  Urinary: no dysuria,  urgency, frequency, incontinence   Psych: no depression or anxiety  Neuro: no migraines with aura, fainting spells, numbness or tingling  Extremities: no joint pain, persistently swollen ankles, recurrent leg cramps         PHYSICAL EXAMINATION:  Vital Signs:   Vitals:    11/21/24 1326   BP: 96/63   Weight: 130 lb 8 oz     Body mass index is 21.72 kg/m².  Constitutional: The patient is well developed and well nourished.  Psychiatric: Patient is oriented to time place and person.   Skin: No rash observed.  Neck: Neck appears symmetric. There are no masses or adenopathy present.  Respiratory: normal effort  Abdomen: Soft, non-tender. FHT were confirmed at 150  Pelvic: Patient self swabbed G/C  Extremeties: Legs are symmetric and without tenderness. There is no edema present.    ACOG SCREENING  Infection Prevention  1. High Risk For HIV: No 6. Rash Or Illness Since LMP: No     2. High Risk For Hepatitis B or C: No 7. History Of STD, GC, Chlamydia, HPV Syphilis: Yes (Comment: FOB chlamydia, Patient GC.)     3. Live With Someone With TB Or Exposed To TB: No 8. Have a cat in the home?: No     4. Patient Or Partner Has A History Of Herpes: No 8a. Responsible for changing the litter?: No     5. History of Chicken Pox: No 9. Other (See Comments Below): No            Genetic Screening/Teratology Counseling- Includes patient, baby's father, or anyone in either family with:  Patient's age 35 years or older as of estimated date of delivery: No     Thalassemia (Italian, Greek, Mediterranean, or  background): MCV less than 80: No     Neural tube defect (Meningomyelocele, Spina bifida, or Anencephaly): No     Congenital heart defect: No     Down syndrome: Yes     Hema-Sachs (Ashkenazi Methodist, Cajun, Khmer Palauan): No     Canavan disease (Ashkenazi Methodist): No     Familial dysautonomia (Ashkenazi Methodist): No     Sickle cell disease or trait (): No     Hemophilia or other blood disorders: No     Muscular dystrophy: No     Cystic fibrosis: No     Eloisa's chorea: No     Mental retardation/autism: Yes     Other inherited genetic or chromosomal disorder: No     Maternal metabolic disorder (eg. Type 1 diabetes, PKU): No     Patient or baby's father had child with birth defects not listed above: No     Recurrent pregnancy loss, or a stillbirth: No     Medications (including supplements, vitamins, herbs, or OTC drugs)/illicit/recreational drugs/alcohol since last menstrual period: No     Any other: osteogenisis imperfecta                 ASSESSMENT AND PLAN:  29 y.o.  at 11w5d     1. Pregnancy, unspecified gestational age  - NIPT FOR FETAL ANEUPLOIDY W/22Q11.2 MICRODELETION (PANORAMA); Future  - PRENATAL PANEL 3 +HIV+HCV+CULTURE; Future  - URINE CULTURE  - URINE DRUG SCREEN; Future  - Chlamydia/GC, PCR (Genital/Anal swab); Future  - Referral to Perinatology    2. Hyperemesis  - ondansetron (ZOFRAN) 4 MG Tab tablet; Take 1 Tablet by mouth every four hours as needed for Nausea/Vomiting.  Dispense: 20 Tablet; Refill: 3  - promethazine (PHENERGAN) 25 MG Suppos; Insert 1 Suppository into the rectum every 6 hours as needed for Nausea/Vomiting.  Dispense: 10 Suppository; Refill: 0  - metoclopramide (REGLAN) 5 MG tablet; Take 1 Tablet by mouth 4 times a day.  Dispense: 20 Tablet; Refill: 3  - She was counseled to take B6/unisom nightly  - Counseled on small frequent meals  - ED precautions reviewed. She is doing much better with the zofran     3. Family history of osteogenesis imperfecta  - Referral to Perinatology    4. H/O  delivery, currently pregnant  - Refer to [facility] placed     - PNL ordered and std screening completed   - Dating reviewed: Dated by LMP 12wk3 US  - Discussed options for genetic/aneuploidy testing and information given for pt to consider.  Advised to call insurance for cost of testing.           - she currently desires aneuploidy testing.           - she currently desires carrier screening but due to OI, will  defer to [facility] for special kit as the kit we have in the clinic doesn't test for OI           - Declines a hemoglobin electrophoresis   - Discussed recommendation for flu, Covid vaccine during pregnancy  - Discussed office policies, prenatal care timeline, weight gain, diet and activity.  - Taking PNV.  - Increase water intake and encouraged healthy nutrition. Encouraged moderate exercise may continue into final trimester.     Return in 4 weeks for next prenatal visit    Steph Benjamin M.D.

## 2024-11-22 LAB
C TRACH DNA GENITAL QL NAA+PROBE: NEGATIVE
N GONORRHOEA DNA GENITAL QL NAA+PROBE: NEGATIVE
SPECIMEN SOURCE: NORMAL

## 2024-12-23 ENCOUNTER — ROUTINE PRENATAL (OUTPATIENT)
Dept: OBGYN | Facility: CLINIC | Age: 29
End: 2024-12-23
Payer: COMMERCIAL

## 2024-12-23 VITALS — BODY MASS INDEX: 22.47 KG/M2 | DIASTOLIC BLOOD PRESSURE: 54 MMHG | WEIGHT: 135 LBS | SYSTOLIC BLOOD PRESSURE: 96 MMHG

## 2024-12-23 DIAGNOSIS — Z3A.16 16 WEEKS GESTATION OF PREGNANCY: ICD-10-CM

## 2024-12-23 DIAGNOSIS — O09.212 CURRENT PREGNANCY WITH HISTORY OF PRE-TERM LABOR IN SECOND TRIMESTER: ICD-10-CM

## 2024-12-23 PROBLEM — Z34.90 PREGNANCY: Status: ACTIVE | Noted: 2024-12-23

## 2024-12-23 PROCEDURE — 0502F SUBSEQUENT PRENATAL CARE: CPT

## 2024-12-23 PROCEDURE — 3078F DIAST BP <80 MM HG: CPT

## 2024-12-23 PROCEDURE — 3074F SYST BP LT 130 MM HG: CPT

## 2024-12-23 ASSESSMENT — FIBROSIS 4 INDEX: FIB4 SCORE: 0.5

## 2024-12-23 NOTE — PROGRESS NOTES
S: 29 y.o.  at 16w2d presents for routine obstetric follow-up.   Patient conveys feeling perhaps some fetal movement.  No contractions, vaginal bleeding, or leakage of fluid.    Denies headaches, vision changes, abdominal pain.  Questions answered.    Scheduled to see MFM on 2024 due to hx of  labor/delivery.      O: BP 96/54   Wt 135 lb   LMP 2024   BMI 22.47 kg/m²   Patients' fluid intake and exercise level discussed.  Vitals, fundal height , fetal position, and FHR reviewed on flowsheet    Lab:No results found for this or any previous visit (from the past 2 weeks).  Recent US: 2024 Cardiac motion 172 bpm  TDaP:  n/a  Flu: Declines, education provided  RSV: Educated on RSV vaccine at 32-36w  GBS: N/A  Rh:  unknown, has not completed labs     A/P:  29 y.o.  at 16w2d presents for routine obstetric follow-up.  Size equals dates and/or scan  - Continue prenatal vitamins- pills not gummies.  - Fetal kick counts.  - Exercise at least 30 minutes daily. Drink at least 3-4L of water daily  - SAB precautions educated.  - Encouraged to keep appointment with MFM on 2024  - Ordered msAFP and encouraged patient to get 1st trimester labs done.     Follow-up in 4 weeks.    YOANA Boyle.  [facility]

## 2024-12-23 NOTE — PROGRESS NOTES
Pt Here OBFU  Reports some FM but not a lot   Pt denies VB, LOF, UC'S  Pt states no questions or concerns   Phone/Pharm Verified   AFP Maternal Declined

## 2025-01-20 ENCOUNTER — ROUTINE PRENATAL (OUTPATIENT)
Dept: OBGYN | Facility: CLINIC | Age: 30
End: 2025-01-20
Payer: COMMERCIAL

## 2025-01-20 VITALS — SYSTOLIC BLOOD PRESSURE: 112 MMHG | WEIGHT: 143 LBS | BODY MASS INDEX: 23.8 KG/M2 | DIASTOLIC BLOOD PRESSURE: 85 MMHG

## 2025-01-20 DIAGNOSIS — Z87.51 HISTORY OF PRETERM DELIVERY: ICD-10-CM

## 2025-01-20 DIAGNOSIS — Z34.90 PREGNANCY, UNSPECIFIED GESTATIONAL AGE: ICD-10-CM

## 2025-01-20 DIAGNOSIS — O44.00 PLACENTA PREVIA ANTEPARTUM: ICD-10-CM

## 2025-01-20 PROCEDURE — 3079F DIAST BP 80-89 MM HG: CPT | Performed by: OBSTETRICS & GYNECOLOGY

## 2025-01-20 PROCEDURE — 3074F SYST BP LT 130 MM HG: CPT | Performed by: OBSTETRICS & GYNECOLOGY

## 2025-01-20 PROCEDURE — 0502F SUBSEQUENT PRENATAL CARE: CPT | Performed by: OBSTETRICS & GYNECOLOGY

## 2025-01-20 ASSESSMENT — FIBROSIS 4 INDEX: FIB4 SCORE: 0.5

## 2025-01-20 NOTE — PROGRESS NOTES
OB Visit Note - 20w2d     MEDICAL DECISION MAKING:  Hannah Perea is a 29 y.o. female  at 20w2d who presents for prenatal care. She has an appointment with [facility] tomorrow. They had discussed drawing her genetic testing then. She misunderstood and thought they'd be doing all her labs. She is going to go to the lab today to complete her initial prenatal labs. The [facility] consult is in the media. She is otherwise doing really. She is feeling the baby move. She denies leaking or bleeding. She is not having contractions.     Pregnancy complicated by:  Patient Active Problem List   Diagnosis    Generalized anxiety disorder    Malignant melanoma of right upper limb, including shoulder (HCC)    Abnormal Pap smear of cervix    Dizziness    Acute nonintractable headache    Family history of ovarian cancer    SAB (spontaneous )    Mass of right axilla    Pregnancy       The patient will follow up in 4 week(s). She was counseled to call or return for vaginal bleeding, regular contractions, leakage of fluid or decreased fetal movement.    Steph Benjamin M.D.

## 2025-01-20 NOTE — NON-PROVIDER
Caller: [facility]    Relationship:     Best call back number: [phone]    What orders are you requesting (i.e. lab or imaging): HOME HEALTH WANTS TO MAKE SURE DR PORTILLO WILL FOLLOW PATIENT IN HOME HEALTH         Pt here for OB f/u   US OB scheduled  AFP not done   Pnp labs not done  Next [facility] appt - tomorrow

## 2025-02-01 ENCOUNTER — HOSPITAL ENCOUNTER (OUTPATIENT)
Facility: MEDICAL CENTER | Age: 30
End: 2025-02-01
Attending: OBSTETRICS & GYNECOLOGY
Payer: COMMERCIAL

## 2025-02-01 ENCOUNTER — HOSPITAL ENCOUNTER (OUTPATIENT)
Facility: MEDICAL CENTER | Age: 30
End: 2025-02-01
Payer: COMMERCIAL

## 2025-02-01 DIAGNOSIS — Z34.90 PREGNANCY, UNSPECIFIED GESTATIONAL AGE: ICD-10-CM

## 2025-02-01 DIAGNOSIS — Z3A.16 16 WEEKS GESTATION OF PREGNANCY: ICD-10-CM

## 2025-02-01 DIAGNOSIS — O09.212 CURRENT PREGNANCY WITH HISTORY OF PRE-TERM LABOR IN SECOND TRIMESTER: ICD-10-CM

## 2025-02-01 LAB
ABO GROUP BLD: NORMAL
BASOPHILS # BLD AUTO: 0.4 % (ref 0–1.8)
BASOPHILS # BLD: 0.04 K/UL (ref 0–0.12)
BLD GP AB SCN SERPL QL: NORMAL
EOSINOPHIL # BLD AUTO: 0.18 K/UL (ref 0–0.51)
EOSINOPHIL NFR BLD: 1.6 % (ref 0–6.9)
ERYTHROCYTE [DISTWIDTH] IN BLOOD BY AUTOMATED COUNT: 46.1 FL (ref 35.9–50)
HBV SURFACE AG SER QL: ABNORMAL
HCT VFR BLD AUTO: 31.9 % (ref 37–47)
HCV AB SER QL: NORMAL
HGB BLD-MCNC: 10.8 G/DL (ref 12–16)
HIV 1+2 AB+HIV1 P24 AG SERPL QL IA: NORMAL
IMM GRANULOCYTES # BLD AUTO: 0.2 K/UL (ref 0–0.11)
IMM GRANULOCYTES NFR BLD AUTO: 1.8 % (ref 0–0.9)
LYMPHOCYTES # BLD AUTO: 1.91 K/UL (ref 1–4.8)
LYMPHOCYTES NFR BLD: 17 % (ref 22–41)
MCH RBC QN AUTO: 31.3 PG (ref 27–33)
MCHC RBC AUTO-ENTMCNC: 33.9 G/DL (ref 32.2–35.5)
MCV RBC AUTO: 92.5 FL (ref 81.4–97.8)
MONOCYTES # BLD AUTO: 0.93 K/UL (ref 0–0.85)
MONOCYTES NFR BLD AUTO: 8.3 % (ref 0–13.4)
NEUTROPHILS # BLD AUTO: 7.96 K/UL (ref 1.82–7.42)
NEUTROPHILS NFR BLD: 70.9 % (ref 44–72)
NRBC # BLD AUTO: 0 K/UL
NRBC BLD-RTO: 0 /100 WBC (ref 0–0.2)
PLATELET # BLD AUTO: 312 K/UL (ref 164–446)
PMV BLD AUTO: 10.8 FL (ref 9–12.9)
RBC # BLD AUTO: 3.45 M/UL (ref 4.2–5.4)
RH BLD: NORMAL
RUBV AB SER QL: 37.3 IU/ML
T PALLIDUM AB SER QL IA: ABNORMAL
WBC # BLD AUTO: 11.2 K/UL (ref 4.8–10.8)

## 2025-02-01 PROCEDURE — 86762 RUBELLA ANTIBODY: CPT

## 2025-02-01 PROCEDURE — 85025 COMPLETE CBC W/AUTO DIFF WBC: CPT

## 2025-02-01 PROCEDURE — 82105 ALPHA-FETOPROTEIN SERUM: CPT

## 2025-02-01 PROCEDURE — 87086 URINE CULTURE/COLONY COUNT: CPT

## 2025-02-01 PROCEDURE — 87340 HEPATITIS B SURFACE AG IA: CPT

## 2025-02-01 PROCEDURE — 86900 BLOOD TYPING SEROLOGIC ABO: CPT

## 2025-02-01 PROCEDURE — 86803 HEPATITIS C AB TEST: CPT

## 2025-02-01 PROCEDURE — 86780 TREPONEMA PALLIDUM: CPT

## 2025-02-01 PROCEDURE — 80307 DRUG TEST PRSMV CHEM ANLYZR: CPT

## 2025-02-01 PROCEDURE — 87389 HIV-1 AG W/HIV-1&-2 AB AG IA: CPT

## 2025-02-01 PROCEDURE — 36415 COLL VENOUS BLD VENIPUNCTURE: CPT

## 2025-02-01 PROCEDURE — 86850 RBC ANTIBODY SCREEN: CPT

## 2025-02-01 PROCEDURE — 86901 BLOOD TYPING SEROLOGIC RH(D): CPT

## 2025-02-03 DIAGNOSIS — D64.9 ANEMIA, UNSPECIFIED TYPE: ICD-10-CM

## 2025-02-03 LAB
AMPHET CTO UR CFM-MCNC: NEGATIVE NG/ML
BACTERIA UR CULT: NORMAL
BARBITURATES CTO UR CFM-MCNC: NEGATIVE NG/ML
BENZODIAZ CTO UR CFM-MCNC: NEGATIVE NG/ML
CANNABINOIDS CTO UR CFM-MCNC: NEGATIVE NG/ML
COCAINE CTO UR CFM-MCNC: NEGATIVE NG/ML
CREAT UR-MCNC: 129.8 MG/DL (ref 20–400)
DRUG COMMENT 753798: NORMAL
METHADONE CTO UR CFM-MCNC: NEGATIVE NG/ML
OPIATES CTO UR CFM-MCNC: NEGATIVE NG/ML
PCP CTO UR CFM-MCNC: NEGATIVE NG/ML
PROPOXYPH CTO UR CFM-MCNC: NEGATIVE NG/ML
SIGNIFICANT IND 70042: NORMAL
SITE SITE: NORMAL
SOURCE SOURCE: NORMAL

## 2025-02-03 RX ORDER — FERROUS SULFATE 325(65) MG
325 TABLET ORAL DAILY
Qty: 30 TABLET | Refills: 5 | Status: SHIPPED | OUTPATIENT
Start: 2025-02-03

## 2025-02-06 LAB
# FETUSES US: NORMAL
AFP MOM SERPL: 1.53
AFP SERPL-MCNC: 135 NG/ML
AGE - REPORTED: 30.3 YR
CURRENT SMOKER: NO
FAMILY MEMBER DISEASES HX: NO
GA METHOD: NORMAL
GA: NORMAL WK
IDDM PATIENT QL: NO
INTEGRATED SCN PATIENT-IMP: NORMAL
SPECIMEN DRAWN SERPL: NORMAL

## 2025-02-15 NOTE — PROGRESS NOTES
Pt here for OB follow-up  Last seen: 1/20/25 w/ Dr. Benjamin   Reports +FM  No VB, LOF, or UC  Phone number: [phone]  Pharmacy verified with pt.  Rh type:A (+)   [facility]: next one in March   Pt states no concerns at this time     Labs -   UTD, completed.   Genetic testing, AFP (-) low risk    US -   Anatomy scan ordered, not done or scheduled Done with [facility]     Vaccines -  Flu offered, declined

## 2025-02-17 PROBLEM — Z87.59 HISTORY OF PRETERM PREMATURE RUPTURE OF MEMBRANES (PPROM): Status: ACTIVE | Noted: 2025-02-17

## 2025-02-17 PROBLEM — Z82.79 FAMILY HISTORY OF OSTEOGENESIS IMPERFECTA: Status: ACTIVE | Noted: 2025-02-17

## 2025-02-17 PROBLEM — O35.2XX0 PREVIOUS CHILD WITH CONGENITAL ANOMALY, CURRENTLY PREGNANT, ANTEPARTUM: Status: ACTIVE | Noted: 2025-02-17

## 2025-02-17 PROBLEM — O09.90 SUPERVISION OF HIGH RISK PREGNANCY, ANTEPARTUM: Status: ACTIVE | Noted: 2024-12-23

## 2025-02-18 ENCOUNTER — ROUTINE PRENATAL (OUTPATIENT)
Dept: OBGYN | Facility: CLINIC | Age: 30
End: 2025-02-18

## 2025-02-18 VITALS — WEIGHT: 144.8 LBS | DIASTOLIC BLOOD PRESSURE: 64 MMHG | BODY MASS INDEX: 24.1 KG/M2 | SYSTOLIC BLOOD PRESSURE: 144 MMHG

## 2025-02-18 DIAGNOSIS — Z82.79 FAMILY HISTORY OF OSTEOGENESIS IMPERFECTA: ICD-10-CM

## 2025-02-18 DIAGNOSIS — O09.90 SUPERVISION OF HIGH RISK PREGNANCY, ANTEPARTUM: Primary | ICD-10-CM

## 2025-02-18 PROCEDURE — 3078F DIAST BP <80 MM HG: CPT | Performed by: OBSTETRICS & GYNECOLOGY

## 2025-02-18 PROCEDURE — 3077F SYST BP >= 140 MM HG: CPT | Performed by: OBSTETRICS & GYNECOLOGY

## 2025-02-18 PROCEDURE — 0502F SUBSEQUENT PRENATAL CARE: CPT | Performed by: OBSTETRICS & GYNECOLOGY

## 2025-02-18 ASSESSMENT — FIBROSIS 4 INDEX: FIB4 SCORE: 0.55

## 2025-02-18 NOTE — PROGRESS NOTES
OB Visit Note - 24w3d     MEDICAL DECISION MAKING:  Hannah Perea is a 30 y.o. female  at 24w3d    ROS:  She denies vaginal bleeding, leakage of fluid, or contractions.  Endorses normal fetal movement.    Reports some midline pubic bone pain with certain movements, improved with stretching last night.  Causing any issues with walking    OBJECTIVE:  Vital Signs: BP (!) 144/64   Wt 144 lb 12.8 oz   LMP 2024   BMI 24.10 kg/m²   Appearance/Psychiatric: to be in no distress  Constitutional: The patient is well nourished.  Respiratory:Respiratory effort is normal.  Gastrointestinal: Abdomen is soft, gravid, non-tendern,no rashes, heart tones are present, fundal height is consistent with dates  Extremities: no edema    Problem   Family History of Osteogenesis Imperfecta    Family history is significant of osteogenesis imperfecta. Her grandmother is a carrier and her aunt was affected and  at 7 months old. Her two siblings are carriers. She has not had carrier testing.     [X] counseling   [X] referral to [facility] for further counseling and genetic testing  [/] Met with [facility] genetic counselor --> testing pending genetics from mother, will update us once complete     Previous Child With Congenital Anomaly, Currently Pregnant, Antepartum    Son with VSD that required surgical repair    [X] referral to [facility]  [x] Growth US wnl  [ ] [facility]     History of  Premature Rupture of Membranes (Pprom)    H/o PPROM @ 36 weeks    [x] serial cervical lengths [facility]/ [facility]     Supervision of High Risk Pregnancy, Antepartum      30 y.o.  dated by LMP c/w 12w3d US.   - Entered PNC at 12 wga  - Final RASTA: 25  - Pre-pregnancy BMI: 21.7    Screening:  [x] H/o genital HSV? no  [x] Varicella immunity by- unknown    Labs and routine testing  [x] 1T labs: wnl  [x] RH: pos, Ab screen neg**  [ x Pap: 2024- NILM (see other problem list)  [x] Genetic screening: NIPT- low risk; further testng  "to be done with HRPC due to testing for OI (see other problem list)  [x] AFP (15-20 wks): negative screen  [ ] GDM (24-28 wks) screening- ordered 2/18/25   [ ] 1 hr GTT: **    [ ] 3hr GTT: **  [ ] 3T labs (28+ wks): Hgb: **/ plts **/HIV **/syphilis **  [ ] GBS (35 wks) **      Immunizations:   [ ] tdap (27+ wks) - next visit  [x] flu-  Declines  [ ] RSV (32-36 wks)-     Ultrasound(s):   [ ] Anatomy US: wnl with [facility]; low lying posterior placenta.    Delivery Plan:   [ ] Fetal presentation (35-36 wga): **  [ ] Accepting of transfusion: **    Postpartum:  [ ] Contraception: **     [ ] considering BTL? (Med 178 signed): **  [ ] PP Vaccinations needed: **       Abnormal Pap Smear of Cervix    History of abnormal Pap smear and did have cervical biopsies that showed precancerous lesions.  Previously w/  Dr. Madison    8/2024: NILM  2022/2023: Reports normal (w/ Dr. Madison)  2020: reports colpo w/ \"pre-cancerous lesions\"  2018: NILM, HP HR pos     Malignant Melanoma of Right Upper Limb, Including Shoulder (Hcc)    Recent history of melanoma with excision.  Follows with Dr. Jackson and Dr. Bess, last biopsy was WNL last 6/2023, every 3 months for 2 years    [ ] send placenta for path to ensure r/o metastasis         Today's visit addressed:   1. Mid-trimester labs ordered    The patient will follow up in 4 week(s). She was counseled to call or return for vaginal bleeding, regular contractions, leakage of fluid or decreased fetal movement.    Oumou Marinelli M.D.   "

## 2025-03-07 ENCOUNTER — OFFICE VISIT (OUTPATIENT)
Dept: URGENT CARE | Facility: PHYSICIAN GROUP | Age: 30
End: 2025-03-07
Payer: COMMERCIAL

## 2025-03-07 VITALS
BODY MASS INDEX: 23.99 KG/M2 | HEART RATE: 111 BPM | TEMPERATURE: 97.8 F | RESPIRATION RATE: 16 BRPM | DIASTOLIC BLOOD PRESSURE: 62 MMHG | SYSTOLIC BLOOD PRESSURE: 108 MMHG | WEIGHT: 144 LBS | HEIGHT: 65 IN | OXYGEN SATURATION: 96 %

## 2025-03-07 DIAGNOSIS — J01.10 ACUTE NON-RECURRENT FRONTAL SINUSITIS: ICD-10-CM

## 2025-03-07 DIAGNOSIS — H66.002 NON-RECURRENT ACUTE SUPPURATIVE OTITIS MEDIA OF LEFT EAR WITHOUT SPONTANEOUS RUPTURE OF TYMPANIC MEMBRANE: ICD-10-CM

## 2025-03-07 DIAGNOSIS — H92.02 OTALGIA, LEFT: ICD-10-CM

## 2025-03-07 DIAGNOSIS — Z3A.26 26 WEEKS GESTATION OF PREGNANCY: ICD-10-CM

## 2025-03-07 PROCEDURE — 99213 OFFICE O/P EST LOW 20 MIN: CPT | Performed by: NURSE PRACTITIONER

## 2025-03-07 PROCEDURE — 3074F SYST BP LT 130 MM HG: CPT | Performed by: NURSE PRACTITIONER

## 2025-03-07 PROCEDURE — 3078F DIAST BP <80 MM HG: CPT | Performed by: NURSE PRACTITIONER

## 2025-03-07 RX ORDER — PREDNISONE 20 MG/1
20 TABLET ORAL DAILY
Qty: 5 TABLET | Refills: 0 | Status: SHIPPED | OUTPATIENT
Start: 2025-03-07 | End: 2025-03-12

## 2025-03-07 ASSESSMENT — VISUAL ACUITY: OU: 1

## 2025-03-07 ASSESSMENT — FIBROSIS 4 INDEX: FIB4 SCORE: 0.55

## 2025-03-07 NOTE — PROGRESS NOTES
Subjective:     Hannah Perea is a 30 y.o. female who presents for Otalgia (Pain radiated to jaw, Left ear, cough ( couple days)/X this AM )       Otalgia   There is pain in the left ear. This is a new problem. The problem has been gradually worsening.     URI/flu symptoms since Sunday. New onset left ear pain.    Ambient listening software ([employer]) used during this encounter with the consent of the patient. The following text is AI-assisted:    History of Present Illness  The patient presents with a cough, left ear pain, and headache.    Cough  She has had a persistent cough for a few days.  - Onset: A few days ago.  - Duration: Persistent.    Left Ear Pain  She has severe left ear pain starting this morning, extending to her jaw. No ear drainage, but general discomfort and chills without fever. Hiccups worsen her ear pain.  - Onset: Starting this morning.  - Location: Left ear, extending to jaw.  - Character: Severe pain, general discomfort, and chills without fever.  - Alleviating/Aggravating Factors: Hiccups worsen ear pain.    Headache  She also has a frontal headache, managed with warm compresses. Vision is unaffected, but she occasionally feels a burning sensation in her eyes, tenderness upon touch, and occasional environmental allergies.  - Onset: After a cold sore.  - Location: Frontal region.  - Character: Frontal headache, burning sensation in eyes, tenderness upon touch.  - Alleviating/Aggravating Factors: Managed with warm compresses.    Additional Information  She is [26] weeks pregnant. Symptoms began after a cold sore. Her son had a similar illness 1.5 weeks ago.    ALLERGIES  - No known allergies.    Review of Systems   HENT:  Positive for ear pain.    All other systems reviewed and are negative.  Refer to HPI for additional details.    During this visit, appropriate PPE was worn, and hand hygiene was performed.    PMH:  has a past medical history of Anxiety, Bronchitis,  Cancer (HCC), Melanoma (HCC), PONV (postoperative nausea and vomiting), Snoring, and TMJ disease.    MEDS:   Current Outpatient Medications:     amoxicillin-clavulanate (AUGMENTIN) 875-125 MG Tab, Take 1 Tablet by mouth 2 times a day for 7 days., Disp: 14 Tablet, Rfl: 0    predniSONE (DELTASONE) 20 MG Tab, Take 1 Tablet by mouth every day for 5 days., Disp: 5 Tablet, Rfl: 0    Prenatal MV-Min-Fe Fum-FA-DHA (PRENATAL 1 PO), Take  by mouth., Disp: , Rfl:     ferrous sulfate 325 (65 Fe) MG tablet, Take 1 Tablet by mouth every day. (Patient not taking: Reported on 2/18/2025), Disp: 30 Tablet, Rfl: 5    ondansetron (ZOFRAN) 4 MG Tab tablet, Take 1 Tablet by mouth every four hours as needed for Nausea/Vomiting. (Patient not taking: Reported on 2/18/2025), Disp: 20 Tablet, Rfl: 3    metoclopramide (REGLAN) 5 MG tablet, Take 1 Tablet by mouth 4 times a day. (Patient not taking: Reported on 12/23/2024), Disp: 20 Tablet, Rfl: 3    ondansetron (ZOFRAN ODT) 4 MG TABLET DISPERSIBLE, Take 1 Tablet by mouth every 6 hours as needed for Nausea/Vomiting. (Patient not taking: Reported on 12/23/2024), Disp: 10 Tablet, Rfl: 0    ALLERGIES: No Known Allergies  SURGHX:   Past Surgical History:   Procedure Laterality Date    AXILLARY NODE DISSECTION Right 8/8/2024    Procedure: EXCISE RIGHT AXILLARY MASS;  Surgeon: Leah Levy M.D.;  Location: SURGERY [facility];  Service: General    CO BX/REMV,LYMPH NODE,DEEP AXILL Right 7/28/2022    Procedure: BIOPSY, LYMPH NODE, SENTINEL;  Surgeon: George Bess M.D.;  Location: SURGERY SAME DAY [facility];  Service: General    WIDE EXCISION, LESION, UPPER EXTREMITY Right 7/28/2022    Procedure: RADICAL WIDE EXCISION OF MALIGNANT MELANOMA OF RIGHT UPPER ARM; RIGHT AXILLARY SENTINEL NODE BIOPSY;  Surgeon: George Bess M.D.;  Location: SURGERY SAME DAY [facility];  Service: General    COLONOSCOPY - ENDO       SOCHX:  reports that she has never smoked. She has never used smokeless tobacco. She  "reports current alcohol use. She reports that she does not currently use drugs after having used the following drugs: Marijuana and Oral.    FH: Per HPI as applicable/pertinent.      Objective:     /62   Pulse (!) 111   Temp 36.6 °C (97.8 °F) (Temporal)   Resp 16   Ht 1.651 m (5' 5\")   Wt 65.3 kg (144 lb)   LMP 08/31/2024   SpO2 96%   BMI 23.96 kg/m²     Physical Exam  Nursing note reviewed.   Constitutional:       General: She is not in acute distress.     Appearance: She is well-developed. She is not ill-appearing or toxic-appearing.   HENT:      Right Ear: Tympanic membrane is not perforated, erythematous or bulging.      Left Ear: Tympanic membrane is erythematous and bulging. Tympanic membrane is not perforated.      Nose:      Right Sinus: Frontal sinus tenderness present.      Left Sinus: Frontal sinus tenderness present.      Mouth/Throat:      Mouth: Mucous membranes are moist.      Pharynx: Oropharynx is clear.   Eyes:      General: Vision grossly intact.   Cardiovascular:      Rate and Rhythm: Tachycardia present.   Pulmonary:      Effort: Pulmonary effort is normal. No respiratory distress.   Musculoskeletal:         General: No deformity. Normal range of motion.   Skin:     General: Skin is warm and dry.      Coloration: Skin is not pale.   Neurological:      Mental Status: She is alert and oriented to person, place, and time.      Motor: No weakness.   Psychiatric:         Behavior: Behavior normal. Behavior is cooperative.       Assessment/Plan:     1. Otalgia, left  - predniSONE (DELTASONE) 20 MG Tab; Take 1 Tablet by mouth every day for 5 days.  Dispense: 5 Tablet; Refill: 0    2. Non-recurrent acute suppurative otitis media of left ear without spontaneous rupture of tympanic membrane  - amoxicillin-clavulanate (AUGMENTIN) 875-125 MG Tab; Take 1 Tablet by mouth 2 times a day for 7 days.  Dispense: 14 Tablet; Refill: 0  - predniSONE (DELTASONE) 20 MG Tab; Take 1 Tablet by mouth every " day for 5 days.  Dispense: 5 Tablet; Refill: 0    3. Acute non-recurrent frontal sinusitis  - amoxicillin-clavulanate (AUGMENTIN) 875-125 MG Tab; Take 1 Tablet by mouth 2 times a day for 7 days.  Dispense: 14 Tablet; Refill: 0  - predniSONE (DELTASONE) 20 MG Tab; Take 1 Tablet by mouth every day for 5 days.  Dispense: 5 Tablet; Refill: 0    4. 26 weeks gestation of pregnancy  - amoxicillin-clavulanate (AUGMENTIN) 875-125 MG Tab; Take 1 Tablet by mouth 2 times a day for 7 days.  Dispense: 14 Tablet; Refill: 0  - predniSONE (DELTASONE) 20 MG Tab; Take 1 Tablet by mouth every day for 5 days.  Dispense: 5 Tablet; Refill: 0    Likely underlying self-limiting URI with superimposed left AOM. Continue supportive measures, rest, fluids, and symptom management with over-the-counter medication as needed such as Claritin and Tylenol. Rx as above sent electronically. Prednisone for symptom relief. Defer viral PCR swab due to duration of symptoms and similar management at this time.    Monitor. Warning signs reviewed. Return precautions advised.     Differential diagnosis, natural history, supportive care, over-the-counter symptom management per 's instructions, close monitoring, and indications for immediate follow-up discussed.     All questions answered. Patient agrees with the plan of care.    Discharge summary provided via [employer].    Work note provided.

## 2025-03-07 NOTE — LETTER
March 7, 2025         Patient: Hannah Perea   YOB: 1995   Date of Visit: 3/7/2025           To Whom it May Concern:    Hannah Perea was seen in my clinic on 3/7/2025 due to illness. Due to medical necessity, please excuse patient from work 3/6 and 3/7 as well as 3/8 if needed.    If you have any questions or concerns, please don't hesitate to call.        Sincerely,         RAY Gusman.  Electronically Signed

## 2025-03-17 ENCOUNTER — APPOINTMENT (OUTPATIENT)
Dept: OBGYN | Facility: CLINIC | Age: 30
End: 2025-03-17

## 2025-03-17 VITALS — BODY MASS INDEX: 24.3 KG/M2 | DIASTOLIC BLOOD PRESSURE: 58 MMHG | WEIGHT: 146 LBS | SYSTOLIC BLOOD PRESSURE: 107 MMHG

## 2025-03-17 DIAGNOSIS — N39.3 STRESS INCONTINENCE: ICD-10-CM

## 2025-03-17 DIAGNOSIS — O09.90 SUPERVISION OF HIGH RISK PREGNANCY, ANTEPARTUM: ICD-10-CM

## 2025-03-17 PROCEDURE — 0502F SUBSEQUENT PRENATAL CARE: CPT

## 2025-03-17 PROCEDURE — 3078F DIAST BP <80 MM HG: CPT

## 2025-03-17 PROCEDURE — 3074F SYST BP LT 130 MM HG: CPT

## 2025-03-17 ASSESSMENT — FIBROSIS 4 INDEX: FIB4 SCORE: 0.55

## 2025-03-17 NOTE — LETTER
"Count Your Baby's Movements  Another step to a healthy delivery    Hannah Perea  [facility]  Dept: [phone]    How Many Weeks Pregnant? 28w2d    Date to Begin Counting: 3/17/2025              How to use this chart    One way for your physician to keep track of your baby's health is by knowing how often the baby moves (or \"kicks\") in your womb.  You can help your physician to do this by using this chart every day.    Every day, you should see how many hours it takes for your baby to move 10 times.  Start in the morning, as soon as you get up.    First, write down the time your baby moves until you get to 10.  Check off one box every time your baby moves until you get to 10.  Write down the time you finished counting in the last column.  Total how long it took to count up all 10 movements.  Finally, fill in the box that shows how long this took.  After counting 10 movements, you no longer have to count any more that day.  The next morning, just start counting again as soon as you get up.    What should you call a \"movement\"?  It is hard to say, because it will feel different from one mother to another and from one pregnancy to the next.  The important thing is that you count the movements the same way throughout your pregnancy.  If you have more questions, you should ask your physician.    Count carefully every day!  SAMPLE:  Week 28    How many hours did it take to feel 10 movements?       Start  Time     1     2     3     4     5     6     7     8     9     10   Finish Time   Mon 8:20           11:40   Tue Wed               u               Fri               Sat               Sun                 IMPORTANT: You should contact your physician if it takes more than two hours for you to feel 10 movements.  Each morning, write down the time and start to count the movements of your baby.  Keep track by checking off one box every time you feel one movement.  " "When you have felt 10 \"kicks\", write down the time you finished counting in the last column.  Then fill in the   box (over the check linette) for the number of hours it took.  Be sure to read the complete instructions on the previous page.            "

## 2025-03-17 NOTE — PROGRESS NOTES
Pt Here OBFU  Reports Good FM  Pt denies VB, LOF, UC'S  Pt states no questions or concerns. Last saw [facility] 3/5/2025 follow up 4 weeks  Phone/Pharm Verified  TDAP Declined  CECE sheet given  BTL declined

## 2025-03-17 NOTE — PROGRESS NOTES
"S: 30 y.o.  at 28w2d presents for routine obstetric follow-up.   Good fetal movement.  No contractions, vaginal bleeding, or leakage of fluid.    Denies abd pain, swelling of hands/face, dysuria, N/V.   Generally feels well today except recent ear infection has caused some mild headaches that do not associated with vision changes and go away with APAP/rest.   Conveys having increased stress incontinence this pregnancy.   Questions answered.    O: /58   Wt 146 lb   LMP 2024   BMI 24.30 kg/m²   Patients' weight gain, fluid intake and exercise level discussed.  Vitals, fundal height , fetal position, and FHR reviewed on flowsheet    Lab:No results found for this or any previous visit (from the past 2 weeks).  Recent US: 2025 MATTI: \"normal\". EFW 1202 g 72%.  Posterior placenta. Done with [facility]. There was bidirectional flow across foramen ovale. The face/neck and spine were not optimally visualized.   Recommendation:   F/u 4-5 weeks for repeat imaging of fetal heart anatomy     TDaP: Declines, education provided  Flu: Declines, education provided  STI Testing:   NIPT: low-risk  msAFP: negative  GBS: N/A  Rh: positive (A positive)   BTL: declined      A/P:  30 y.o.  at 28w2d presents for routine obstetric follow-up.  Size equals dates and/or scan  - Encouraged her to complete 3rd trimester labs by next visit   - Met with [facility] genetic counselor --> testing pending genetics from mother, will update us once complete. Will discuss first week of April when she has her f/u with them.   - Continue prenatal vitamins, pills not gummies.  - Discussed pelvic floor PT to help with incontinence and efficacy of pushes during L&D. She would like a referral and this was ordered.  - Fetal kick counts.  - Exercise at least 30 minutes daily. Drink at least 3-4L of water daily  - Has been to LnD  - PTL and pre-e precautions educated.  - Send placenta for path to r/o metastasis after delivery    Follow-up in 2 " miguelangel.    YOANA Boyle.  [facility]

## 2025-03-18 NOTE — Clinical Note
REFERRAL APPROVAL NOTICE         Sent on March 18, 2025                   Hannah Perea  [address] [facility] [address]                   Dear Ms. Jordon Perea,    After a careful review of the medical information and benefit coverage, [facility] has processed your referral. See below for additional details.    If applicable, you must be actively enrolled with your insurance for coverage of the authorized service. If you have any questions regarding your coverage, please contact your insurance directly.    REFERRAL INFORMATION   Referral #:  [id number]  Referred-To Provider    Referred-By Provider:  Physical Therapy    MAITE Boyle   [facility] [employer]      [address]  [address]  [facility] [address]  [phone] [id number]  [facility] [id number] [phone]    Referral Start Date:  03/17/2025  Referral End Date:   03/17/2026             SCHEDULING  If you do not already have an appointment, please call [phone] to make an appointment.     MORE INFORMATION  If you do not already have a [employer] account, sign up at: [email or web address]  You can access your medical information, make appointments, see lab results, billing information, and more.  If you have questions regarding this referral, please contact  the [facility] Referrals department at:             [phone]. Monday - Friday 8:00AM - 5:00PM.     Sincerely,    [facility]

## 2025-03-25 ENCOUNTER — TELEPHONE (OUTPATIENT)
Dept: OBGYN | Facility: CLINIC | Age: 30
End: 2025-03-25
Payer: COMMERCIAL

## 2025-03-25 NOTE — TELEPHONE ENCOUNTER
Caller Name: Hannah Perea   Call Back Number: [phone]     How would the patient prefer to be contacted with a response: Phone call do NOT leave a detailed message    Called pt to return her voicemail. Pt was transferred to Martínez Benjamin's medical assistant to further advise.

## 2025-03-31 ENCOUNTER — ROUTINE PRENATAL (OUTPATIENT)
Dept: OBGYN | Facility: CLINIC | Age: 30
End: 2025-03-31
Payer: COMMERCIAL

## 2025-03-31 VITALS — BODY MASS INDEX: 24.96 KG/M2 | DIASTOLIC BLOOD PRESSURE: 57 MMHG | SYSTOLIC BLOOD PRESSURE: 93 MMHG | WEIGHT: 150 LBS

## 2025-03-31 DIAGNOSIS — O09.90 SUPERVISION OF HIGH RISK PREGNANCY, ANTEPARTUM: ICD-10-CM

## 2025-03-31 ASSESSMENT — FIBROSIS 4 INDEX: FIB4 SCORE: 0.55

## 2025-03-31 NOTE — PROGRESS NOTES
"S: 30 y.o.  at 30w2d presents for routine obstetric follow-up.   Good fetal movement.  No contractions, vaginal bleeding, or leakage of fluid.    Denies headaches, vision changes, dysuria,.  Generally feels well today except some mild nausea and some mild swelling of hands and feet since last visit that goes away the next day if she drinks enough water. Not currently experiencing swelling  Still awaiting visit with [facility] regarding genetics.   Questions answered.    O: BP 93/57   Wt 150 lb   LMP 2024   BMI 24.96 kg/m²   Patients' weight gain, fluid intake and exercise level discussed.  Vitals, fundal height , fetal position, and FHR reviewed on flowsheet    Lab:No results found for this or any previous visit (from the past 2 weeks).  Recent US: 2025 MATTI: \"normal\". EFW 1202 g 72%.  Posterior placenta. Done with [facility]. There was bidirectional flow across foramen ovale. The face/neck and spine were not optimally visualized.   Recommendation:   F/u 4-5 weeks for repeat imaging of fetal heart anatomy      TDaP: Declines, education provided  Flu: Declines, education provided  STI Testing: non-reactive HCV, HIV. Negative CT/NG negative  NIPT: low-risk  msAFP: negative  GBS: N/A  Rh: positive (A positive)   BTL: declined      A/P:  30 y.o.  at 30w2d presents for routine obstetric follow-up.  Size equals dates and/or scan  - Going to complete midtrimester labs tomorrow  - Next appointment with [facility] 2025. Pending genetics from mother, will update us once complete. Will discuss during her f/u with them on .   - Continue prenatal vitamins, pills not gummies.  - Fetal kick counts.  - Exercise at least 30 minutes daily. Drink at least 3-4L of water daily  - Has been to LnD  - PTL and pre-e precautions educated.    Follow-up in 2 weeks.    YOANA Boyle.  [facility]     "

## 2025-04-02 ENCOUNTER — TELEPHONE (OUTPATIENT)
Dept: OBGYN | Facility: CLINIC | Age: 30
End: 2025-04-02
Payer: COMMERCIAL

## 2025-04-02 ENCOUNTER — HOSPITAL ENCOUNTER (OUTPATIENT)
Facility: MEDICAL CENTER | Age: 30
End: 2025-04-02
Attending: OBSTETRICS & GYNECOLOGY
Payer: COMMERCIAL

## 2025-04-02 DIAGNOSIS — O09.90 SUPERVISION OF HIGH RISK PREGNANCY, ANTEPARTUM: ICD-10-CM

## 2025-04-02 LAB
ERYTHROCYTE [DISTWIDTH] IN BLOOD BY AUTOMATED COUNT: 39.9 FL (ref 35.9–50)
GLUCOSE 1H P 50 G GLC PO SERPL-MCNC: 150 MG/DL (ref 70–139)
HCT VFR BLD AUTO: 29.2 % (ref 37–47)
HGB BLD-MCNC: 9.5 G/DL (ref 12–16)
MCH RBC QN AUTO: 28.9 PG (ref 27–33)
MCHC RBC AUTO-ENTMCNC: 32.5 G/DL (ref 32.2–35.5)
MCV RBC AUTO: 88.8 FL (ref 81.4–97.8)
PLATELET # BLD AUTO: 287 K/UL (ref 164–446)
PMV BLD AUTO: 10.7 FL (ref 9–12.9)
RBC # BLD AUTO: 3.29 M/UL (ref 4.2–5.4)
T PALLIDUM AB SER QL IA: NORMAL
WBC # BLD AUTO: 10.9 K/UL (ref 4.8–10.8)

## 2025-04-02 PROCEDURE — 86780 TREPONEMA PALLIDUM: CPT

## 2025-04-02 PROCEDURE — 82950 GLUCOSE TEST: CPT

## 2025-04-02 PROCEDURE — 85027 COMPLETE CBC AUTOMATED: CPT

## 2025-04-02 PROCEDURE — 36415 COLL VENOUS BLD VENIPUNCTURE: CPT

## 2025-04-02 NOTE — TELEPHONE ENCOUNTER
Pt called in stating that she is getting her 1hr GTT done later today and wanted to know what she should and should not eat. I did remind her that she should refrain from eating sugars and carbs 8 hours before testing. I let her know she would be able to drink water as well and eat foods with protein. Pt understood and call ended.

## 2025-04-03 ENCOUNTER — RESULTS FOLLOW-UP (OUTPATIENT)
Dept: OBGYN | Facility: CLINIC | Age: 30
End: 2025-04-03
Payer: COMMERCIAL

## 2025-04-03 DIAGNOSIS — R73.09 ELEVATED GLUCOSE TOLERANCE TEST: ICD-10-CM

## 2025-04-03 DIAGNOSIS — O99.019 ANTEPARTUM ANEMIA: ICD-10-CM

## 2025-04-03 DIAGNOSIS — O99.013 ANEMIA IN PREGNANCY, THIRD TRIMESTER: ICD-10-CM

## 2025-04-03 DIAGNOSIS — O09.90 SUPERVISION OF HIGH RISK PREGNANCY, ANTEPARTUM: ICD-10-CM

## 2025-04-03 NOTE — TELEPHONE ENCOUNTER
Phone Number Called: [phone]    Call outcome: Spoke to patient regarding message below.    Message: Called pt to discuss her 1hr glucose test and her anemia labs. I relayed Dr. Marinelli's message and she mentioned there was some issues with this as well and thinks we may need to redraw. Pt states that she was not there for a FULL hour before getting her blood drawn, it was about 40 minutes after the drink was consumed. She also mentioned she was unaware of all the rules for fasting but they mentioned a light meal was okay, however, she had consumed breakfast which included pineapples and tortillas so she thinks the test could have been skewed, she is wondering if we have to do a 3hr or if we can redo the 1hr or if there are other options. I told her I would reach out and see what Dr. Marinelli thinks, given the situation. She also mentioned that she was having issues taking the Iron pills as instructed, it mentions that it should be taken without any melas - therefore I told her to try to take it with a very light meal or a small glass of milk to see if this helps or I will reach out to a provider to see what else may e suggested.PT understood and will await for a response.

## 2025-04-03 NOTE — TELEPHONE ENCOUNTER
----- Message from Physician Oumou Marinelli M.D. sent at 4/3/2025 11:26 AM PDT -----  Will you call and discuss the labs with her showing anemia and elevated 1hr GTT.  I have ordered a 3hr GTT, can you ask her to get this done soon.    I am also ordering labs for anemia to assess this. Can you ask if she has been taking her iron pills and remind her to take them if not.

## 2025-04-04 NOTE — TELEPHONE ENCOUNTER
Phone Number Called: [phone]    Call outcome: Spoke to patient regarding message below.    Message: Called pt to let her the following:    Also she can take the iron pills with breakfast, preferably with orange juice (small amount) if possible.     The 40 minutes is weird, but. Actually strongly suggest they eat before a 1 hour test. Fasting before a 1 hour test actually makes it worse. Either way a 3hr is diagnostic, so this will tell us for sure. If she does the 1hr again and it’s elevated then she will have to do the 3hr. It’s better and will give us an even better idea if she had GDM if she just does the 3hr.      Pt understood and asked if labs are open on Saturdays, I gave her a few and mentioned that she can call ahead of time to also make an appt if needed. Pt understood and will get that done,.

## 2025-04-15 ENCOUNTER — APPOINTMENT (OUTPATIENT)
Dept: OBGYN | Facility: CLINIC | Age: 30
End: 2025-04-15
Payer: COMMERCIAL

## 2025-04-22 ENCOUNTER — HOSPITAL ENCOUNTER (OUTPATIENT)
Dept: LAB | Facility: MEDICAL CENTER | Age: 30
End: 2025-04-22
Attending: OBSTETRICS & GYNECOLOGY
Payer: COMMERCIAL

## 2025-04-22 DIAGNOSIS — O09.90 SUPERVISION OF HIGH RISK PREGNANCY, ANTEPARTUM: ICD-10-CM

## 2025-04-22 DIAGNOSIS — R73.09 ELEVATED GLUCOSE TOLERANCE TEST: ICD-10-CM

## 2025-04-22 LAB
FERRITIN SERPL-MCNC: 12 NG/ML (ref 10–291)
GLUCOSE 1H P CHAL SERPL-MCNC: 134 MG/DL (ref 65–180)
GLUCOSE 2H P CHAL SERPL-MCNC: 153 MG/DL (ref 65–155)
GLUCOSE 3H P CHAL SERPL-MCNC: 132 MG/DL (ref 65–140)
GLUCOSE BS SERPL-MCNC: 73 MG/DL (ref 65–95)
IRON SATN MFR SERPL: 7 % (ref 15–55)
IRON SERPL-MCNC: 40 UG/DL (ref 40–170)
TIBC SERPL-MCNC: 563 UG/DL (ref 250–450)
TRANSFERRIN SERPL-MCNC: 466 MG/DL (ref 200–370)
UIBC SERPL-MCNC: 523 UG/DL (ref 110–370)
VIT B12 SERPL-MCNC: 205 PG/ML (ref 211–911)

## 2025-04-22 PROCEDURE — 83540 ASSAY OF IRON: CPT

## 2025-04-22 PROCEDURE — 82746 ASSAY OF FOLIC ACID SERUM: CPT

## 2025-04-22 PROCEDURE — 83550 IRON BINDING TEST: CPT

## 2025-04-22 PROCEDURE — 36415 COLL VENOUS BLD VENIPUNCTURE: CPT

## 2025-04-22 PROCEDURE — 82607 VITAMIN B-12: CPT

## 2025-04-22 PROCEDURE — 82952 GTT-ADDED SAMPLES: CPT

## 2025-04-22 PROCEDURE — 82728 ASSAY OF FERRITIN: CPT

## 2025-04-22 PROCEDURE — 82951 GLUCOSE TOLERANCE TEST (GTT): CPT

## 2025-04-22 PROCEDURE — 84466 ASSAY OF TRANSFERRIN: CPT

## 2025-04-23 LAB — FOLATE SERPL-MCNC: 13 NG/ML

## 2025-04-26 ENCOUNTER — RESULTS FOLLOW-UP (OUTPATIENT)
Dept: OBGYN | Facility: CLINIC | Age: 30
End: 2025-04-26
Payer: COMMERCIAL

## 2025-06-09 ENCOUNTER — HOSPITAL ENCOUNTER (INPATIENT)
Facility: MEDICAL CENTER | Age: 30
LOS: 1 days | DRG: 769 | End: 2025-06-11
Attending: OBSTETRICS & GYNECOLOGY | Admitting: STUDENT IN AN ORGANIZED HEALTH CARE EDUCATION/TRAINING PROGRAM
Payer: COMMERCIAL

## 2025-06-09 DIAGNOSIS — N71.9 ENDOMETRITIS: Primary | ICD-10-CM

## 2025-06-09 DIAGNOSIS — O09.90 SUPERVISION OF HIGH RISK PREGNANCY, ANTEPARTUM: ICD-10-CM

## 2025-06-09 ASSESSMENT — FIBROSIS 4 INDEX: FIB4 SCORE: 0.6

## 2025-06-10 ENCOUNTER — ANESTHESIA EVENT (OUTPATIENT)
Dept: OBGYN | Facility: MEDICAL CENTER | Age: 30
DRG: 769 | End: 2025-06-10
Payer: COMMERCIAL

## 2025-06-10 ENCOUNTER — APPOINTMENT (OUTPATIENT)
Dept: RADIOLOGY | Facility: MEDICAL CENTER | Age: 30
DRG: 769 | End: 2025-06-10
Payer: COMMERCIAL

## 2025-06-10 ENCOUNTER — ANESTHESIA (OUTPATIENT)
Dept: OBGYN | Facility: MEDICAL CENTER | Age: 30
DRG: 769 | End: 2025-06-10
Payer: COMMERCIAL

## 2025-06-10 PROBLEM — N71.9 ENDOMETRITIS: Status: ACTIVE | Noted: 2025-06-10

## 2025-06-10 LAB
ALBUMIN SERPL BCP-MCNC: 3.8 G/DL (ref 3.2–4.9)
ALBUMIN/GLOB SERPL: 1.1 G/DL
ALP SERPL-CCNC: 170 U/L (ref 30–99)
ALT SERPL-CCNC: 19 U/L (ref 2–50)
ANION GAP SERPL CALC-SCNC: 13 MMOL/L (ref 7–16)
AST SERPL-CCNC: 22 U/L (ref 12–45)
BASOPHILS # BLD AUTO: 0.5 % (ref 0–1.8)
BASOPHILS # BLD: 0.06 K/UL (ref 0–0.12)
BILIRUB SERPL-MCNC: 0.3 MG/DL (ref 0.1–1.5)
BUN SERPL-MCNC: 12 MG/DL (ref 8–22)
CALCIUM ALBUM COR SERPL-MCNC: 9 MG/DL (ref 8.5–10.5)
CALCIUM SERPL-MCNC: 8.8 MG/DL (ref 8.5–10.5)
CHLORIDE SERPL-SCNC: 102 MMOL/L (ref 96–112)
CO2 SERPL-SCNC: 21 MMOL/L (ref 20–33)
CREAT SERPL-MCNC: 0.64 MG/DL (ref 0.5–1.4)
EOSINOPHIL # BLD AUTO: 0.12 K/UL (ref 0–0.51)
EOSINOPHIL NFR BLD: 1 % (ref 0–6.9)
ERYTHROCYTE [DISTWIDTH] IN BLOOD BY AUTOMATED COUNT: 46.5 FL (ref 35.9–50)
ERYTHROCYTE [DISTWIDTH] IN BLOOD BY AUTOMATED COUNT: 46.9 FL (ref 35.9–50)
GFR SERPLBLD CREATININE-BSD FMLA CKD-EPI: 122 ML/MIN/1.73 M 2
GLOBULIN SER CALC-MCNC: 3.5 G/DL (ref 1.9–3.5)
GLUCOSE SERPL-MCNC: 97 MG/DL (ref 65–99)
HCT VFR BLD AUTO: 29.3 % (ref 37–47)
HCT VFR BLD AUTO: 30.6 % (ref 37–47)
HGB BLD-MCNC: 9.4 G/DL (ref 12–16)
HGB BLD-MCNC: 9.8 G/DL (ref 12–16)
HOLDING TUBE BB 8507: NORMAL
IMM GRANULOCYTES # BLD AUTO: 0.1 K/UL (ref 0–0.11)
IMM GRANULOCYTES NFR BLD AUTO: 0.8 % (ref 0–0.9)
LYMPHOCYTES # BLD AUTO: 1.77 K/UL (ref 1–4.8)
LYMPHOCYTES NFR BLD: 14.5 % (ref 22–41)
MCH RBC QN AUTO: 26 PG (ref 27–33)
MCH RBC QN AUTO: 26.2 PG (ref 27–33)
MCHC RBC AUTO-ENTMCNC: 32 G/DL (ref 32.2–35.5)
MCHC RBC AUTO-ENTMCNC: 32.1 G/DL (ref 32.2–35.5)
MCV RBC AUTO: 81.2 FL (ref 81.4–97.8)
MCV RBC AUTO: 81.8 FL (ref 81.4–97.8)
MONOCYTES # BLD AUTO: 0.77 K/UL (ref 0–0.85)
MONOCYTES NFR BLD AUTO: 6.3 % (ref 0–13.4)
NEUTROPHILS # BLD AUTO: 9.42 K/UL (ref 1.82–7.42)
NEUTROPHILS NFR BLD: 76.9 % (ref 44–72)
NRBC # BLD AUTO: 0 K/UL
NRBC BLD-RTO: 0 /100 WBC (ref 0–0.2)
PATHOLOGY CONSULT NOTE: NORMAL
PLATELET # BLD AUTO: 413 K/UL (ref 164–446)
PLATELET # BLD AUTO: 429 K/UL (ref 164–446)
PMV BLD AUTO: 9.3 FL (ref 9–12.9)
PMV BLD AUTO: 9.6 FL (ref 9–12.9)
POTASSIUM SERPL-SCNC: 4 MMOL/L (ref 3.6–5.5)
PROT SERPL-MCNC: 7.3 G/DL (ref 6–8.2)
RBC # BLD AUTO: 3.61 M/UL (ref 4.2–5.4)
RBC # BLD AUTO: 3.74 M/UL (ref 4.2–5.4)
SODIUM SERPL-SCNC: 136 MMOL/L (ref 135–145)
WBC # BLD AUTO: 12.2 K/UL (ref 4.8–10.8)
WBC # BLD AUTO: 9.5 K/UL (ref 4.8–10.8)

## 2025-06-10 PROCEDURE — 160048 HCHG OR STATISTICAL LEVEL 1-5: Performed by: STUDENT IN AN ORGANIZED HEALTH CARE EDUCATION/TRAINING PROGRAM

## 2025-06-10 PROCEDURE — 36415 COLL VENOUS BLD VENIPUNCTURE: CPT

## 2025-06-10 PROCEDURE — 88305 TISSUE EXAM BY PATHOLOGIST: CPT | Performed by: PATHOLOGY

## 2025-06-10 PROCEDURE — 770002 HCHG ROOM/CARE - OB PRIVATE (112)

## 2025-06-10 PROCEDURE — 700111 HCHG RX REV CODE 636 W/ 250 OVERRIDE (IP): Mod: JZ | Performed by: ANESTHESIOLOGY

## 2025-06-10 PROCEDURE — 700105 HCHG RX REV CODE 258: Performed by: ANESTHESIOLOGY

## 2025-06-10 PROCEDURE — 160041 HCHG SURGERY MINUTES - EA ADDL 1 MIN LEVEL 4: Performed by: STUDENT IN AN ORGANIZED HEALTH CARE EDUCATION/TRAINING PROGRAM

## 2025-06-10 PROCEDURE — 85025 COMPLETE CBC W/AUTO DIFF WBC: CPT

## 2025-06-10 PROCEDURE — 160193 HCHG PACU STANDARD - 1ST 60 MINS: Performed by: STUDENT IN AN ORGANIZED HEALTH CARE EDUCATION/TRAINING PROGRAM

## 2025-06-10 PROCEDURE — A9270 NON-COVERED ITEM OR SERVICE: HCPCS

## 2025-06-10 PROCEDURE — A9270 NON-COVERED ITEM OR SERVICE: HCPCS | Performed by: STUDENT IN AN ORGANIZED HEALTH CARE EDUCATION/TRAINING PROGRAM

## 2025-06-10 PROCEDURE — 700105 HCHG RX REV CODE 258: Performed by: STUDENT IN AN ORGANIZED HEALTH CARE EDUCATION/TRAINING PROGRAM

## 2025-06-10 PROCEDURE — 59160 D & C AFTER DELIVERY: CPT | Performed by: STUDENT IN AN ORGANIZED HEALTH CARE EDUCATION/TRAINING PROGRAM

## 2025-06-10 PROCEDURE — 700102 HCHG RX REV CODE 250 W/ 637 OVERRIDE(OP): Performed by: STUDENT IN AN ORGANIZED HEALTH CARE EDUCATION/TRAINING PROGRAM

## 2025-06-10 PROCEDURE — 85027 COMPLETE CBC AUTOMATED: CPT

## 2025-06-10 PROCEDURE — 80053 COMPREHEN METABOLIC PANEL: CPT

## 2025-06-10 PROCEDURE — 700102 HCHG RX REV CODE 250 W/ 637 OVERRIDE(OP)

## 2025-06-10 PROCEDURE — 700101 HCHG RX REV CODE 250: Performed by: ANESTHESIOLOGY

## 2025-06-10 PROCEDURE — 700111 HCHG RX REV CODE 636 W/ 250 OVERRIDE (IP): Mod: JZ | Performed by: STUDENT IN AN ORGANIZED HEALTH CARE EDUCATION/TRAINING PROGRAM

## 2025-06-10 PROCEDURE — 59160 D & C AFTER DELIVERY: CPT | Mod: 80 | Performed by: OBSTETRICS & GYNECOLOGY

## 2025-06-10 PROCEDURE — 160029 HCHG SURGERY MINUTES - 1ST 30 MINS LEVEL 4: Performed by: STUDENT IN AN ORGANIZED HEALTH CARE EDUCATION/TRAINING PROGRAM

## 2025-06-10 PROCEDURE — 160192 HCHG ANESTHESIA COMPLEX: Performed by: STUDENT IN AN ORGANIZED HEALTH CARE EDUCATION/TRAINING PROGRAM

## 2025-06-10 PROCEDURE — 10D17ZZ EXTRACTION OF PRODUCTS OF CONCEPTION, RETAINED, VIA NATURAL OR ARTIFICIAL OPENING: ICD-10-PCS | Performed by: STUDENT IN AN ORGANIZED HEALTH CARE EDUCATION/TRAINING PROGRAM

## 2025-06-10 PROCEDURE — 76856 US EXAM PELVIC COMPLETE: CPT

## 2025-06-10 PROCEDURE — 88305 TISSUE EXAM BY PATHOLOGIST: CPT | Mod: 26 | Performed by: PATHOLOGY

## 2025-06-10 PROCEDURE — 160002 HCHG RECOVERY MINUTES (STAT): Performed by: STUDENT IN AN ORGANIZED HEALTH CARE EDUCATION/TRAINING PROGRAM

## 2025-06-10 RX ORDER — ONDANSETRON 2 MG/ML
4 INJECTION INTRAMUSCULAR; INTRAVENOUS EVERY 6 HOURS PRN
Status: DISCONTINUED | OUTPATIENT
Start: 2025-06-11 | End: 2025-06-11 | Stop reason: HOSPADM

## 2025-06-10 RX ORDER — HALOPERIDOL 5 MG/ML
1 INJECTION INTRAMUSCULAR
Status: DISCONTINUED | OUTPATIENT
Start: 2025-06-10 | End: 2025-06-11 | Stop reason: HOSPADM

## 2025-06-10 RX ORDER — CITRIC ACID/SODIUM CITRATE 334-500MG
30 SOLUTION, ORAL ORAL ONCE
Status: DISCONTINUED | OUTPATIENT
Start: 2025-06-10 | End: 2025-06-10 | Stop reason: HOSPADM

## 2025-06-10 RX ORDER — LIDOCAINE HYDROCHLORIDE 20 MG/ML
INJECTION, SOLUTION EPIDURAL; INFILTRATION; INTRACAUDAL; PERINEURAL PRN
Status: DISCONTINUED | OUTPATIENT
Start: 2025-06-10 | End: 2025-06-10 | Stop reason: SURG

## 2025-06-10 RX ORDER — SODIUM CHLORIDE, SODIUM GLUCONATE, SODIUM ACETATE, POTASSIUM CHLORIDE AND MAGNESIUM CHLORIDE 526; 502; 368; 37; 30 MG/100ML; MG/100ML; MG/100ML; MG/100ML; MG/100ML
1000 INJECTION, SOLUTION INTRAVENOUS ONCE
Status: COMPLETED | OUTPATIENT
Start: 2025-06-10 | End: 2025-06-10

## 2025-06-10 RX ORDER — IPRATROPIUM BROMIDE AND ALBUTEROL SULFATE 2.5; .5 MG/3ML; MG/3ML
3 SOLUTION RESPIRATORY (INHALATION)
Status: DISCONTINUED | OUTPATIENT
Start: 2025-06-10 | End: 2025-06-11 | Stop reason: HOSPADM

## 2025-06-10 RX ORDER — DOCUSATE SODIUM 100 MG/1
100 CAPSULE, LIQUID FILLED ORAL 2 TIMES DAILY PRN
Status: DISCONTINUED | OUTPATIENT
Start: 2025-06-10 | End: 2025-06-11 | Stop reason: HOSPADM

## 2025-06-10 RX ORDER — SODIUM CHLORIDE, SODIUM GLUCONATE, SODIUM ACETATE, POTASSIUM CHLORIDE AND MAGNESIUM CHLORIDE 526; 502; 368; 37; 30 MG/100ML; MG/100ML; MG/100ML; MG/100ML; MG/100ML
INJECTION, SOLUTION INTRAVENOUS
Status: DISCONTINUED | OUTPATIENT
Start: 2025-06-10 | End: 2025-06-10 | Stop reason: SURG

## 2025-06-10 RX ORDER — DIPHENHYDRAMINE HYDROCHLORIDE 50 MG/ML
12.5 INJECTION, SOLUTION INTRAMUSCULAR; INTRAVENOUS
Status: DISCONTINUED | OUTPATIENT
Start: 2025-06-10 | End: 2025-06-11 | Stop reason: HOSPADM

## 2025-06-10 RX ORDER — ONDANSETRON 2 MG/ML
INJECTION INTRAMUSCULAR; INTRAVENOUS PRN
Status: DISCONTINUED | OUTPATIENT
Start: 2025-06-10 | End: 2025-06-10 | Stop reason: SURG

## 2025-06-10 RX ORDER — MEPERIDINE HYDROCHLORIDE 25 MG/ML
12.5 INJECTION INTRAMUSCULAR; INTRAVENOUS; SUBCUTANEOUS
Status: DISCONTINUED | OUTPATIENT
Start: 2025-06-10 | End: 2025-06-11 | Stop reason: HOSPADM

## 2025-06-10 RX ORDER — DEXAMETHASONE SODIUM PHOSPHATE 4 MG/ML
INJECTION, SOLUTION INTRA-ARTICULAR; INTRALESIONAL; INTRAMUSCULAR; INTRAVENOUS; SOFT TISSUE PRN
Status: DISCONTINUED | OUTPATIENT
Start: 2025-06-10 | End: 2025-06-10 | Stop reason: SURG

## 2025-06-10 RX ORDER — SODIUM CHLORIDE, SODIUM LACTATE, POTASSIUM CHLORIDE, CALCIUM CHLORIDE 600; 310; 30; 20 MG/100ML; MG/100ML; MG/100ML; MG/100ML
INJECTION, SOLUTION INTRAVENOUS CONTINUOUS
Status: DISCONTINUED | OUTPATIENT
Start: 2025-06-10 | End: 2025-06-11 | Stop reason: HOSPADM

## 2025-06-10 RX ORDER — IBUPROFEN 800 MG/1
800 TABLET, FILM COATED ORAL EVERY 8 HOURS PRN
Status: DISCONTINUED | OUTPATIENT
Start: 2025-06-13 | End: 2025-06-11 | Stop reason: HOSPADM

## 2025-06-10 RX ORDER — ACETAMINOPHEN 500 MG
1000 TABLET ORAL EVERY 6 HOURS PRN
Status: DISCONTINUED | OUTPATIENT
Start: 2025-06-14 | End: 2025-06-11 | Stop reason: HOSPADM

## 2025-06-10 RX ORDER — HYDROMORPHONE HYDROCHLORIDE 1 MG/ML
0.1 INJECTION, SOLUTION INTRAMUSCULAR; INTRAVENOUS; SUBCUTANEOUS
Status: DISCONTINUED | OUTPATIENT
Start: 2025-06-10 | End: 2025-06-11 | Stop reason: HOSPADM

## 2025-06-10 RX ORDER — ACETAMINOPHEN 500 MG
1000 TABLET ORAL ONCE
Status: COMPLETED | OUTPATIENT
Start: 2025-06-10 | End: 2025-06-10

## 2025-06-10 RX ORDER — MIDAZOLAM HYDROCHLORIDE 1 MG/ML
1 INJECTION INTRAMUSCULAR; INTRAVENOUS
Status: DISCONTINUED | OUTPATIENT
Start: 2025-06-10 | End: 2025-06-11 | Stop reason: HOSPADM

## 2025-06-10 RX ORDER — EPHEDRINE SULFATE 50 MG/ML
5 INJECTION, SOLUTION INTRAVENOUS
Status: DISCONTINUED | OUTPATIENT
Start: 2025-06-10 | End: 2025-06-11 | Stop reason: HOSPADM

## 2025-06-10 RX ORDER — SODIUM CHLORIDE, SODIUM LACTATE, POTASSIUM CHLORIDE, CALCIUM CHLORIDE 600; 310; 30; 20 MG/100ML; MG/100ML; MG/100ML; MG/100ML
2000 INJECTION, SOLUTION INTRAVENOUS PRN
Status: DISCONTINUED | OUTPATIENT
Start: 2025-06-10 | End: 2025-06-11 | Stop reason: HOSPADM

## 2025-06-10 RX ORDER — OXYCODONE HCL 5 MG/5 ML
10 SOLUTION, ORAL ORAL
Status: DISCONTINUED | OUTPATIENT
Start: 2025-06-10 | End: 2025-06-11 | Stop reason: HOSPADM

## 2025-06-10 RX ORDER — IBUPROFEN 800 MG/1
800 TABLET, FILM COATED ORAL EVERY 8 HOURS
Status: DISCONTINUED | OUTPATIENT
Start: 2025-06-10 | End: 2025-06-11 | Stop reason: HOSPADM

## 2025-06-10 RX ORDER — METOCLOPRAMIDE HYDROCHLORIDE 5 MG/ML
10 INJECTION INTRAMUSCULAR; INTRAVENOUS ONCE
Status: DISCONTINUED | OUTPATIENT
Start: 2025-06-10 | End: 2025-06-10 | Stop reason: HOSPADM

## 2025-06-10 RX ORDER — OXYCODONE HCL 5 MG/5 ML
5 SOLUTION, ORAL ORAL
Status: DISCONTINUED | OUTPATIENT
Start: 2025-06-10 | End: 2025-06-11 | Stop reason: HOSPADM

## 2025-06-10 RX ORDER — VITAMIN A ACETATE, BETA CAROTENE, ASCORBIC ACID, CHOLECALCIFEROL, .ALPHA.-TOCOPHEROL ACETATE, DL-, THIAMINE MONONITRATE, RIBOFLAVIN, NIACINAMIDE, PYRIDOXINE HYDROCHLORIDE, FOLIC ACID, CYANOCOBALAMIN, CALCIUM CARBONATE, FERROUS FUMARATE, ZINC OXIDE, CUPRIC OXIDE 3080; 12; 120; 400; 1; 1.84; 3; 20; 22; 920; 25; 200; 27; 10; 2 [IU]/1; UG/1; MG/1; [IU]/1; MG/1; MG/1; MG/1; MG/1; MG/1; [IU]/1; MG/1; MG/1; MG/1; MG/1; MG/1
1 TABLET, FILM COATED ORAL
Status: DISCONTINUED | OUTPATIENT
Start: 2025-06-10 | End: 2025-06-11 | Stop reason: HOSPADM

## 2025-06-10 RX ORDER — HYDROMORPHONE HYDROCHLORIDE 1 MG/ML
0.4 INJECTION, SOLUTION INTRAMUSCULAR; INTRAVENOUS; SUBCUTANEOUS
Status: DISCONTINUED | OUTPATIENT
Start: 2025-06-10 | End: 2025-06-11 | Stop reason: HOSPADM

## 2025-06-10 RX ORDER — ONDANSETRON 2 MG/ML
4 INJECTION INTRAMUSCULAR; INTRAVENOUS
Status: DISCONTINUED | OUTPATIENT
Start: 2025-06-10 | End: 2025-06-11 | Stop reason: HOSPADM

## 2025-06-10 RX ORDER — HYDRALAZINE HYDROCHLORIDE 20 MG/ML
5 INJECTION INTRAMUSCULAR; INTRAVENOUS
Status: DISCONTINUED | OUTPATIENT
Start: 2025-06-10 | End: 2025-06-11 | Stop reason: HOSPADM

## 2025-06-10 RX ORDER — ONDANSETRON 4 MG/1
4 TABLET, ORALLY DISINTEGRATING ORAL EVERY 6 HOURS PRN
Status: DISCONTINUED | OUTPATIENT
Start: 2025-06-11 | End: 2025-06-11 | Stop reason: HOSPADM

## 2025-06-10 RX ORDER — IBUPROFEN 800 MG/1
800 TABLET, FILM COATED ORAL EVERY 8 HOURS PRN
Status: DISCONTINUED | OUTPATIENT
Start: 2025-06-14 | End: 2025-06-10

## 2025-06-10 RX ORDER — LABETALOL HYDROCHLORIDE 5 MG/ML
5 INJECTION, SOLUTION INTRAVENOUS
Status: DISCONTINUED | OUTPATIENT
Start: 2025-06-10 | End: 2025-06-11 | Stop reason: HOSPADM

## 2025-06-10 RX ORDER — HYDROMORPHONE HYDROCHLORIDE 1 MG/ML
0.2 INJECTION, SOLUTION INTRAMUSCULAR; INTRAVENOUS; SUBCUTANEOUS
Status: DISCONTINUED | OUTPATIENT
Start: 2025-06-10 | End: 2025-06-11 | Stop reason: HOSPADM

## 2025-06-10 RX ORDER — IBUPROFEN 800 MG/1
800 TABLET, FILM COATED ORAL ONCE
Status: DISCONTINUED | OUTPATIENT
Start: 2025-06-10 | End: 2025-06-10

## 2025-06-10 RX ORDER — ACETAMINOPHEN 500 MG
1000 TABLET ORAL EVERY 6 HOURS
Status: DISCONTINUED | OUTPATIENT
Start: 2025-06-11 | End: 2025-06-11 | Stop reason: HOSPADM

## 2025-06-10 RX ORDER — IBUPROFEN 800 MG/1
800 TABLET, FILM COATED ORAL EVERY 8 HOURS
Status: DISCONTINUED | OUTPATIENT
Start: 2025-06-11 | End: 2025-06-10

## 2025-06-10 RX ORDER — DIPHENHYDRAMINE HCL 25 MG
25 TABLET ORAL EVERY 6 HOURS PRN
Status: DISCONTINUED | OUTPATIENT
Start: 2025-06-11 | End: 2025-06-11 | Stop reason: HOSPADM

## 2025-06-10 RX ORDER — DIPHENHYDRAMINE HYDROCHLORIDE 50 MG/ML
25 INJECTION, SOLUTION INTRAMUSCULAR; INTRAVENOUS EVERY 6 HOURS PRN
Status: DISCONTINUED | OUTPATIENT
Start: 2025-06-11 | End: 2025-06-11 | Stop reason: HOSPADM

## 2025-06-10 RX ADMIN — FENTANYL CITRATE 50 MCG: 50 INJECTION, SOLUTION INTRAMUSCULAR; INTRAVENOUS at 04:25

## 2025-06-10 RX ADMIN — AMPICILLIN AND SULBACTAM 3 G: 1; 2 INJECTION, POWDER, FOR SOLUTION INTRAMUSCULAR; INTRAVENOUS at 09:16

## 2025-06-10 RX ADMIN — IBUPROFEN 800 MG: 800 TABLET, FILM COATED ORAL at 06:56

## 2025-06-10 RX ADMIN — PROPOFOL 40 MG: 10 INJECTION, EMULSION INTRAVENOUS at 03:50

## 2025-06-10 RX ADMIN — ACETAMINOPHEN 1000 MG: 500 TABLET ORAL at 01:02

## 2025-06-10 RX ADMIN — DEXAMETHASONE SODIUM PHOSPHATE 8 MG: 4 INJECTION INTRA-ARTICULAR; INTRALESIONAL; INTRAMUSCULAR; INTRAVENOUS; SOFT TISSUE at 04:14

## 2025-06-10 RX ADMIN — FENTANYL CITRATE 50 MCG: 50 INJECTION, SOLUTION INTRAMUSCULAR; INTRAVENOUS at 04:29

## 2025-06-10 RX ADMIN — SODIUM CHLORIDE, SODIUM GLUCONATE, SODIUM ACETATE, POTASSIUM CHLORIDE AND MAGNESIUM CHLORIDE: 526; 502; 368; 37; 30 INJECTION, SOLUTION INTRAVENOUS at 03:39

## 2025-06-10 RX ADMIN — AMPICILLIN AND SULBACTAM 3 G: 1; 2 INJECTION, POWDER, FOR SOLUTION INTRAMUSCULAR; INTRAVENOUS at 21:18

## 2025-06-10 RX ADMIN — AMPICILLIN AND SULBACTAM 3 G: 1; 2 INJECTION, POWDER, FOR SOLUTION INTRAMUSCULAR; INTRAVENOUS at 15:09

## 2025-06-10 RX ADMIN — PROPOFOL 130 MG: 10 INJECTION, EMULSION INTRAVENOUS at 03:46

## 2025-06-10 RX ADMIN — AMPICILLIN AND SULBACTAM 3 G: 1; 2 INJECTION, POWDER, FOR SOLUTION INTRAMUSCULAR; INTRAVENOUS at 03:50

## 2025-06-10 RX ADMIN — ONDANSETRON 4 MG: 2 INJECTION INTRAMUSCULAR; INTRAVENOUS at 04:18

## 2025-06-10 RX ADMIN — SODIUM CHLORIDE, SODIUM GLUCONATE, SODIUM ACETATE, POTASSIUM CHLORIDE AND MAGNESIUM CHLORIDE 1000 ML: 526; 502; 368; 37; 30 INJECTION, SOLUTION INTRAVENOUS at 03:30

## 2025-06-10 RX ADMIN — SODIUM CHLORIDE, SODIUM GLUCONATE, SODIUM ACETATE, POTASSIUM CHLORIDE AND MAGNESIUM CHLORIDE: 526; 502; 368; 37; 30 INJECTION, SOLUTION INTRAVENOUS at 04:25

## 2025-06-10 RX ADMIN — LIDOCAINE HYDROCHLORIDE 60 MG: 20 INJECTION, SOLUTION EPIDURAL; INFILTRATION; INTRACAUDAL; PERINEURAL at 03:46

## 2025-06-10 RX ADMIN — PROPOFOL 30 MG: 10 INJECTION, EMULSION INTRAVENOUS at 03:48

## 2025-06-10 ASSESSMENT — PATIENT HEALTH QUESTIONNAIRE - PHQ9
SUM OF ALL RESPONSES TO PHQ9 QUESTIONS 1 AND 2: 0
1. LITTLE INTEREST OR PLEASURE IN DOING THINGS: NOT AT ALL
2. FEELING DOWN, DEPRESSED, IRRITABLE, OR HOPELESS: NOT AT ALL

## 2025-06-10 ASSESSMENT — LIFESTYLE VARIABLES
ON A TYPICAL DAY WHEN YOU DRINK ALCOHOL HOW MANY DRINKS DO YOU HAVE: 0
AVERAGE NUMBER OF DAYS PER WEEK YOU HAVE A DRINK CONTAINING ALCOHOL: 0
TOTAL SCORE: 0
EVER FELT BAD OR GUILTY ABOUT YOUR DRINKING: NO
TOTAL SCORE: 0
ALCOHOL_USE: NO
TOTAL SCORE: 0
HOW MANY TIMES IN THE PAST YEAR HAVE YOU HAD 5 OR MORE DRINKS IN A DAY: 0
HAVE PEOPLE ANNOYED YOU BY CRITICIZING YOUR DRINKING: NO
HAVE YOU EVER FELT YOU SHOULD CUT DOWN ON YOUR DRINKING: NO
CONSUMPTION TOTAL: NEGATIVE
EVER HAD A DRINK FIRST THING IN THE MORNING TO STEADY YOUR NERVES TO GET RID OF A HANGOVER: NO

## 2025-06-10 ASSESSMENT — PAIN DESCRIPTION - PAIN TYPE: TYPE: ACUTE PAIN;SURGICAL PAIN

## 2025-06-10 ASSESSMENT — PAIN SCALES - GENERAL: PAIN_LEVEL: 0

## 2025-06-10 NOTE — H&P
"    History and Physical    Patient is a 30 y.o.  at 7 days postpartum (delivery 6/3) after an uncomplicated vaginal homebirth at 39w3d with concerns for severe abdominal tenderness that started about a day and a half ago. She also reports that her smart ring told her that she had an elevated body temperature overnight, so she checked an oral temperature today which was 101F. She also reports a slight increase in bleeding with a foul odor.      She reports her labor progress was uncomplicated with SROM, then the onset of contractions 7 hours later. She delivered 12 hours after the onset on contractions. She reports very few cervical checks during labor, her placenta delivering normally and intact per her midwife, and no excessive bleeding after birth.     She has not taken any medications at home for pain or fever.       O: Ht 1.651 m (5' 5\")   Wt 63.5 kg (140 lb)   LMP 2024   BMI 23.30 kg/m²   Temp: 99.7 F    PMHx: anxiety, h/o melanoma  PSHx: axillary node dissection and radial wide excision of malignant melanoma of right upper arm  NKDA  Meds: tylenol and motrin prn  Family hx:  +ovarian cancer in mother and maternal grandmother (patient with negative BRCA and adhikari testing)  Social hx: no h/o tobacco use, previous social alcohol use, h/o marijuana use       Physical Exam  Pulmonary:      Effort: Pulmonary effort is normal.   Abdominal:      General: Abdomen is flat. There is no distension.      Palpations: There is no mass.      Tenderness: There is abdominal tenderness. There is guarding.   Musculoskeletal:         General: Normal range of motion.   Skin:     General: Skin is warm and dry.   Neurological:      General: No focal deficit present.      Mental Status: She is alert.      Imaging:    HISTORY/REASON FOR EXAM:  Abdominal tenderness, concerning for retained products of conception  TECHNIQUE/EXAM DESCRIPTION:  Transabdominal pelvic ultrasound.     UTERUS:     The uterus measures 6.23 cm x " 13.61 cm x 9.87 cm.  The uterine myometrium is within normal limits.  The endometrial echo complex measures 4.58 cm.  The endometrium demonstrate increased Doppler flow. Hypoechoic material in the endometrial canal is seen.     OVARIES:     The right ovary measures 3.73 cm x 1.90 cm x 2.66 cm. Duplex Doppler examination of the right ovary shows normal waveforms.     The left ovary measures 2.41 cm x 1.62 cm x 2.58 cm. Duplex Doppler examination of the left ovary shows normal waveforms.     There is no free fluid seen.     IMPRESSION:        1.  Thickened endometrial stripe with increased Doppler flow, appearance concerning for retained products of conception.  2.  Hypoechoic material in the endometrial canal, appearance suggesting hemorrhagic products.        A/P:       Patient Active Problem List     Diagnosis Date Noted    Elevated glucose tolerance test 2025    Antepartum anemia 2025    Family history of osteogenesis imperfecta 2025    Previous child with congenital anomaly, currently pregnant, antepartum 2025    History of  premature rupture of membranes (PPROM) 2025    Supervision of high risk pregnancy, antepartum 2024    Mass of right axilla 2024    SAB (spontaneous ) 2024    Abnormal Pap smear of cervix 2023    Dizziness 2023    Acute nonintractable headache 2023    Family history of ovarian cancer 2023    Malignant melanoma of right upper limb, including shoulder (HCC) 2022    Generalized anxiety disorder 2018      29yo  PPD#7 s/p vaginal delivery at home.  Presents today with fever at home, worsening fundal tenderness consistent with endometritis and imaging concerning for retained products of conception.    -- admit   -- explained imaging results to patient and support person concerning for retained products of conception; fever and fundal tenderness concerning for endometritis  -- Unasyn x24 hrs  --  counseled and consented for dilation and curettage:  -- patient consents to blood transfusion if indicated  -- patient will be 8hrs NPO at 0300  -- will proceed to OR when all parties ready  -- all questions answered    Syd Aggarwal D.O., FACOG    D&C procedure discussed with patient. Risks and benefits of D&C are discussed at this time. Specific risks are infection, bleeding, uterine perforation with possible subsequent damage to surrounding structures requiring additional surgery via laparotomy; low risk for possible hysterectomy for life threatening bleeding or injury.

## 2025-06-10 NOTE — ANESTHESIA TIME REPORT
Anesthesia Start and Stop Event Times       Date Time Event    6/10/2025 0333 Ready for Procedure     0339 Anesthesia Start     0450 Anesthesia Stop          Responsible Staff  06/10/25      Name Role Begin End    Corbin Tam M.D. Anesth 0339 0450          Overtime Reason:  no overtime (within assigned shift)    Comments:

## 2025-06-10 NOTE — PROGRESS NOTES
Delivered at home on 6/3 with midwife Radha at 39/3. Pt arrived to labor and delivery for c/o abdominal pain that started today and a fever of 101f. Pt rates pain 3/10 with no movement, 6/10 with standing, and 8/10 with movement. Pt also indorses a HA since today, a slight increase in bleeding, and a slight foul odor on her pads. Pt placed in LDA 6. VSS. Pt states she did not take anything for pain relief at home. Abdomen tender to palpation. A small amount of red blood was noted on pt's pad, per pt she put it on right before she left her house.     0000 - Report given to FRANCISCO Guzman. Orders received, see orders for details.     0058 - Provider at bedside.    0230 - Dr. Aggarwal at bedside. D/C called.    0250 - Report given to PELON Ledezma. POC discussed.

## 2025-06-10 NOTE — OP REPORT
SUCTION DILATION AND CURETTAGE OPERATIVE NOTE:    Patient Name: Hannah Perea  YOB: 1995  MRN: [id number]    DATE OF SURGERY: 6/10/2025    PREOPERATIVE DIAGNOSIS:   Suspected retained products of conception  Endometritis, postpartum day 7    POSTOPERATIVE DIAGNOSIS:   Retained products of conception  Endometritis, postpartum day 7    PROCEDURE: Suction Dilation and Curettage    SURGEON: Syd Aggarwal D.O.    ASSISTANT: Tamy Chandra M.D.    ANESTHESIA:  Corbin Tam M.D.    ANTIBIOTICS: Unasyn 3g q6h x24h    ESTIMATED BLOOD LOSS: 200 ml    COMPLICATIONS: None    SPECIMENS:   1. Products of conception    FINDINGS: Uterine fundus at U-1cm; cervix dilated approx 3cm.  Products of conception obtained on multiple passes with suction and sharp curettage under ultrasound guidance    DESCRIPTION OF PROCEDURE:   The patient was taken to the operating room where general anesthesia was administered without difficulty. She was placed in the dorsal lithotomy position with legs in Hayden-type stirrups. She was prepped and draped in the normal sterile fashion. A time-out was then performed.    An exam under anesthesia revealed the above findings. A montalvo catheter was placed. An operative speculum was inserted into the posterior aspect of the vagina, and the anterior lip of the cervix was grasped with a single tooth tenaculum. The cervical os was dilated to 3cm.  A 12-mm curved suction curette was introduced and advanced to the uterine fundus. The suction was then started. A moderate amount of products of conception were evacuated with the curette rotating outward. The suction curette was removed, and gentle endometrial curettage was performed in multiple passes using a sharp curette. The suction curette was then reintroduced to clear the uterus of any remaining blood and products. The tenaculum was removed from the cervix, and good hemostasis was noted. The entire procedure was performed under  ultrasound guidance.    The patient tolerated the procedure well. All counts were correct times two. She was awakened from general anesthesia and taken to the recovery room in stable condition.    Syd Aggarwal D.O.  Obstetrics and Gynecology  6/10/2025    4:46 AM

## 2025-06-10 NOTE — ED PROVIDER NOTES
"S: Pt is a 30 y.o.  at 7 days postpartum (delivery 6/3) after an uncomplicated vaginal homebirth at 39w3d with concerns for severe abdominal tenderness that started today. She also reports that her smart ring told her that she had an elevated body temperature overnight, so she checked an oral temperature today which was 101. She also reports a slight increase in bleeding with a foul odor.     She reports her labor progress was uncomplicated with SROM, then the onset of contractions 7 hours later. She delivered 12 hours after the onset on contractions. She reports very few cervical checks during labor, her placenta delivering normally and intact per her midwife, and no excessive bleeding after birth.    She has not taken any medications at home for pain or fever.      O: Ht 1.651 m (5' 5\")   Wt 63.5 kg (140 lb)   LMP 2024   BMI 23.30 kg/m²   Temp: 99.7 F      Physical Exam  Pulmonary:      Effort: Pulmonary effort is normal.   Abdominal:      General: Abdomen is flat. There is no distension.      Palpations: There is no mass.      Tenderness: There is abdominal tenderness. There is guarding.   Musculoskeletal:         General: Normal range of motion.   Skin:     General: Skin is warm and dry.   Neurological:      General: No focal deficit present.      Mental Status: She is alert.       HISTORY/REASON FOR EXAM:  Abdominal tenderness, concerning for retained products of conception  TECHNIQUE/EXAM DESCRIPTION:  Transabdominal pelvic ultrasound.     IMPRESSION:  1.  Thickened endometrial stripe with increased Doppler flow, appearance concerning for retained products of conception.  2.  Hypoechoic material in the endometrial canal, appearance suggesting hemorrhagic products.      A/P  Patient Active Problem List    Diagnosis Date Noted    Elevated glucose tolerance test 2025    Antepartum anemia 2025    Family history of osteogenesis imperfecta 2025    Previous child with congenital " anomaly, currently pregnant, antepartum 2025    History of  premature rupture of membranes (PPROM) 2025    Supervision of high risk pregnancy, antepartum 2024    Mass of right axilla 2024    SAB (spontaneous ) 2024    Abnormal Pap smear of cervix 2023    Dizziness 2023    Acute nonintractable headache 2023    Family history of ovarian cancer 2023    Malignant melanoma of right upper limb, including shoulder (HCC) 2022    Generalized anxiety disorder 2018       # @ 7 days postpartum    #Abdominal pain  -US shows retained products of conception  -CMP/CBC WNL        Rocío Guzman CNM, YAZMIN Aggarwal MD consulted for higher level of care

## 2025-06-10 NOTE — ANESTHESIA PROCEDURE NOTES
Airway    Date/Time: 6/10/2025 3:47 AM    Performed by: Corbin Tam M.D.  Authorized by: Corbin Tam M.D.    Location:  OR  Urgency:  Elective  Difficult Airway: No    Indications for Airway Management:  Anesthesia      Spontaneous Ventilation: absent    Sedation Level:  Deep  Preoxygenated: Yes    Mask Difficulty Assessment:  1 - vent by mask  Final Airway Type:  Supraglottic airway  Final Supraglottic Airway:  Standard LMA    SGA Size:  3  Number of Attempts at Approach:  1

## 2025-06-10 NOTE — ANESTHESIA POSTPROCEDURE EVALUATION
Patient: Hannah Perea    Procedure Summary       Date: 06/10/25 Room / Location: [facility] OR 03 / SURGERY LABOR AND DELIVERY    Anesthesia Start: 0339 Anesthesia Stop: 0450    Procedure: DILATION AND CURETTAGE (Vagina ) Diagnosis:     Surgeons: Syd Aggarwal D.O. Responsible Provider: Corbin Tam M.D.    Anesthesia Type: general ASA Status: 2 - Emergent            Final Anesthesia Type: general  Last vitals  BP        Temp   37 °C (98.6 °F)    Pulse       Resp        SpO2          Anesthesia Post Evaluation    Patient location during evaluation: PACU  Patient participation: complete - patient participated  Level of consciousness: awake and alert  Pain score: 0    Airway patency: patent  Anesthetic complications: no  Cardiovascular status: hemodynamically stable  Respiratory status: acceptable  Hydration status: euvolemic    PONV: none          No notable events documented.     Nurse Pain Score: 2 (NPRS)

## 2025-06-10 NOTE — CARE PLAN
The patient is Stable - Low risk of patient condition declining or worsening    Shift Goals  Clinical Goals: monitor for vaginal bleeding and pain  Patient Goals: rest and comfort  Family Goals: support    Progress made toward(s) clinical / shift goals:    Problem: Knowledge Deficit - L&D  Goal: Patient and family/caregivers will demonstrate understanding of plan of care, disease process/condition, diagnostic tests and medications  Outcome: Progressing     Problem: Infection - Postpartum  Goal: Postpartum patient will be free of signs and symptoms of infection  Outcome: Progressing       Patient is not progressing towards the following goals:

## 2025-06-10 NOTE — ANESTHESIA PREPROCEDURE EVALUATION
Case: [id number] Date/Time: 06/10/25 0245    Procedure: DILATION AND CURETTAGE (Vagina )    Location: [facility] OR 03 / [facility]ERY LABOR [facility] DELIVERY    Surgeons: Syd Aggarwal D.O.            Relevant Problems   NEURO   (positive) Acute nonintractable headache      OB   (positive) History of  premature rupture of membranes (PPROM)      Other   (positive) Antepartum anemia   (positive) Endometritis       Physical Exam    Airway   Mallampati: II  TM distance: >3 FB  Neck ROM: full       Cardiovascular - normal exam  Rhythm: regular  Rate: normal    (-) murmur     Dental - normal exam           Pulmonary - normal examBreath sounds clear to auscultation     Abdominal    Neurological - normal exam                   Anesthesia Plan    ASA 2- EMERGENT   ASA physical status emergent criteria: acute hemorrhage and acutely contaminated wound or identified infection source    Plan - general       Airway plan will be LMA          Induction: intravenous    Postoperative Plan: Postoperative administration of opioids is intended.    Pertinent diagnostic labs and testing reviewed    Informed Consent:    Anesthetic plan and risks discussed with patient.    Use of blood products discussed with: patient whom consented to blood products.

## 2025-06-10 NOTE — PROGRESS NOTES
0700- report received from Iman BIRD, POC discussed.  0815- pt assisted to bathroom, steady on feet, able to void.

## 2025-06-10 NOTE — PROGRESS NOTES
0300: Report received from PELON Barbosa. Care assumed. Planning for D&C procedure, pt consented by Dr. Aggarwal.    0325: Dr. Tam at bs for anesthesia consent.    0339: In OR 3.     0355: timeout performed.     0445: Pt transferred from OR3 in stable condition via gurney to PACU 3. Anesthesia and this RN at bs, handoff report received from anesthesia MD.     0646: pt transferred to antepartum rm via gurney in stable condition by this RN. Report given to Irene and Iman, RNs, POC discussed.

## 2025-06-11 ENCOUNTER — PHARMACY VISIT (OUTPATIENT)
Dept: PHARMACY | Facility: MEDICAL CENTER | Age: 30
End: 2025-06-11
Payer: COMMERCIAL

## 2025-06-11 VITALS
DIASTOLIC BLOOD PRESSURE: 55 MMHG | WEIGHT: 140 LBS | OXYGEN SATURATION: 96 % | SYSTOLIC BLOOD PRESSURE: 98 MMHG | HEART RATE: 98 BPM | BODY MASS INDEX: 23.32 KG/M2 | TEMPERATURE: 97.2 F | HEIGHT: 65 IN | RESPIRATION RATE: 18 BRPM

## 2025-06-11 PROCEDURE — A9270 NON-COVERED ITEM OR SERVICE: HCPCS | Performed by: STUDENT IN AN ORGANIZED HEALTH CARE EDUCATION/TRAINING PROGRAM

## 2025-06-11 PROCEDURE — 700102 HCHG RX REV CODE 250 W/ 637 OVERRIDE(OP): Performed by: STUDENT IN AN ORGANIZED HEALTH CARE EDUCATION/TRAINING PROGRAM

## 2025-06-11 PROCEDURE — RXMED WILLOW AMBULATORY MEDICATION CHARGE: Performed by: STUDENT IN AN ORGANIZED HEALTH CARE EDUCATION/TRAINING PROGRAM

## 2025-06-11 PROCEDURE — 700111 HCHG RX REV CODE 636 W/ 250 OVERRIDE (IP): Mod: JZ | Performed by: OBSTETRICS & GYNECOLOGY

## 2025-06-11 PROCEDURE — 700105 HCHG RX REV CODE 258: Performed by: OBSTETRICS & GYNECOLOGY

## 2025-06-11 RX ORDER — ACETAMINOPHEN 500 MG
1000 TABLET ORAL EVERY 8 HOURS
Qty: 90 TABLET | Refills: 0 | Status: SHIPPED | OUTPATIENT
Start: 2025-06-11 | End: 2025-06-26

## 2025-06-11 RX ORDER — IBUPROFEN 800 MG/1
800 TABLET, FILM COATED ORAL EVERY 8 HOURS PRN
Qty: 30 TABLET | Refills: 0 | Status: SHIPPED | OUTPATIENT
Start: 2025-06-11

## 2025-06-11 RX ADMIN — PRENATAL WITH FERROUS FUM AND FOLIC ACID 1 TABLET: 3080; 920; 120; 400; 22; 1.84; 3; 20; 10; 1; 12; 200; 27; 25; 2 TABLET ORAL at 07:23

## 2025-06-11 RX ADMIN — AMPICILLIN AND SULBACTAM 3 G: 1; 2 INJECTION, POWDER, FOR SOLUTION INTRAMUSCULAR; INTRAVENOUS at 02:56

## 2025-06-11 ASSESSMENT — PAIN DESCRIPTION - PAIN TYPE: TYPE: ACUTE PAIN

## 2025-06-11 NOTE — PROGRESS NOTES
D/C'd.  Discharge instructions provided to pt.  Pt states understanding.  Pt states all questions have been answered.  Copy of discharge provided to pt.  Signed copy in chart.  Prescriptions provided to pt, per meds to beds. Pt's breast milk given to pt. Pt states that all personal belongings are in possession.  Pt escorted off unit with assistance from this RN without incident.

## 2025-06-11 NOTE — PROGRESS NOTES
Report received. Assumed care. Pt in bed awake. A/O x4. VSS. Responds appropriately. Denies pain, SOB. Assessment complete. Discussed POC, , pt verbalizes understanding. Call light and belongings within reach.  Bed in the lowest position. Treaded socks in place. Hourly rounding in progress. Will continue to monitor .

## 2025-06-11 NOTE — DISCHARGE INSTRUCTIONS
Discharge Instructions    Discharge Instructions:   - Recommend follow up in 1 week  - Return with worsening pain, fevers, chills, chest pain, shortness of breath, recurrence of foul smelling lochia  PATIENT DISCHARGE EDUCATION INSTRUCTION SHEET    REASONS TO CALL YOUR OBSTETRICIAN  Persistent fever, shaking, chills (Temperature higher than 100.4) may indicate you have an infection  Heavy bleeding: soaking more than 1 pad per hour; Passing clots an egg-sized clot or bigger may mean you have an postpartum hemorrhage  Foul odor from vagina or bad smelling or discolored discharge or blood  Breast infection (Mastitis symptoms); breast pain, chills, fever, redness or red streaks, may feel flu like symptoms  Urinary pain, burning or frequency  Incision that is not healing, increased redness, swelling, tenderness or pain, or any pus from episiotomy or  site may mean you have an infection  Redness, swelling, warmth, or painful to touch in the calf area of your leg may mean you have a blood clot  Severe or intensified depression, thoughts or feelings of wanting to hurt yourself or someone else   Pain in chest, obstructed breathing or shortness of breath (trouble catching your breath) may mean you are having a postpartum complication. Call your provider immediately   Headache that does not get better, even after taking medicine, a bad headache with vision changes or pain in the upper right area of your belly may mean you have high blood pressure or post birth preeclampsia. Call your provider immediately    HAND WASHING  All family and friends should wash their hands:  Before and after holding the baby  Before feeding the baby  After using the restroom or changing the baby's diaper    WOUND CARE  Ask your physician for additional care instructions. In general:    Episiotomy/Laceration  May use brittany-spray bottle, witch hazel pads and dermaplast spray for comfort  Use brittany-spray bottle after urinating to cleanse  perineal area  To prevent burning during urination spray brittany-water bottle on labial area   Pat perineal area dry until episiotomy/laceration is healed  Continue to use brittany-bottle until bleeding stops as needed  If have a 2nd degree laceration or greater, a Sitz bath can offer relief from soreness, burning, and inflammation   Sitz Bath   Sit in 6 inches of warm water and soak laceration as needed until the laceration heals    VAGINAL CARE AND BLEEDING  Nothing inside vagina for 6 weeks:   No sexual intercourse, tampons or douching  Bleeding may continue for 2-4 weeks. Amount and color may vary  Soaking 1 pad or more in an hour for several hours is considered heavy bleeding  Passing large egg sized blood clots can be concerning  If you feel like you have heavy bleeding or are having increasing amount of blood clots call your Obstetrician immediately  If you begin feeling faint upon standing, feeling sick to your stomach, have clammy skin, a really fast heartbeat, have chills, start feeling confused, dizzy, sleepy or weak, or feeling like you're going to faint call your Obstetrician immediately    HYPERTENSION   Preeclampsia or gestational hypertension are types of high blood pressure that only pregnant women can get. It is important for you to be aware of symptoms to seek early intervention and treatment. If you have any of these symptoms immediately call your Obstetrician    Vision changes or blurred vision   Severe headache or pain that is unrelieved with medication and will not go away  Persistent pain in upper abdomen or shoulder   Increased swelling of face, feet, or hands  Difficulty breathing or shortness of breath at rest  Urinating less than usual    URINATION AND BOWEL MOVEMENTS  Eating more fiber (bran cereal, fruits, and vegetables) and drinking plenty of fluids will help to avoid constipation  Urinary frequency and urgency after childbirth is normal  If you experience any urinary pain, burning or  "frequency call your provider    BIRTH CONTROL  It is possible to become pregnant at any time after delivery and while breastfeeding  Plan to discuss a method of birth control with your physician at your post delivery follow up visit    POSTPARTUM BLUES  During the first few days after birth, you may experience a sense of the \"blues\" which may include impatience, irritability or even crying. These feelings come and go quickly. However, as many as 1 in 10 women experience emotional symptoms known as postpartum depression.     POSTPARTUM DEPRESSION    May start as early as the second or third day after delivery or take several weeks or months to develop. Symptoms of \"blues\" are present, but are more intense: Crying spells; loss of appetite; feelings of hopelessness or loss of control; fear of touching the baby; over concern or no concern at all about the baby; little or no concern about your own appearance/caring for yourself; and/or inability to sleep or excessive sleeping. Contact your Obstetrician if you are experiencing any of these symptoms     PREVENTING SHAKEN BABY  If you are angry or stressed, PUT THE BABY IN THE CRIB, step away, take some deep breaths, and wait until you are calm to care for the baby. DO NOT SHAKE THE BABY. You are not alone, call a supporter for help.  [facility] 24/7 crisis call line ([phone]) or ([phone])  You can also text them, text \"ANSWER\" (392861)  "

## 2025-06-11 NOTE — CARE PLAN
The patient is Stable - Low risk of patient condition declining or worsening    Shift Goals  Clinical Goals: stable VS, IV antbx, pain control  Patient Goals: rest, pumping and storing breast milk  Family Goals: support    Progress made toward(s) clinical / shift goals:    Problem: Knowledge Deficit - Postpartum  Goal: Patient will verbalize and demonstrate understanding of self and infant care  Outcome: Progressing  Note: Educated pt on POC, monitor VS, antbx, safety, DC planning, all questions answered, hourly rounding in progress     Problem: Pain - Standard  Goal: Alleviation of pain or a reduction in pain to the patient’s comfort goal  Outcome: Progressing  Note: VSS, pt denies any pain at this time, resting in bed comfortably, hourly rounding in progress       Patient is not progressing towards the following goals:

## 2025-06-11 NOTE — DISCHARGE SUMMARY
Obstetrics Discharge Summary       Dates of admission: 06/10/2025 to 2025    Discharge diagnosis:     1. 30 y.o.  who is PPD#8 s/p vaginal homebirth  2. Retained products of conception  3. Endometritis       Reason for Admission:     Retained products of conception, endometritis     Hospital Course:     Hannah Perea is a 30 y.o.  who was admitted for management of retained products of conception and ongoing treatment of endometritis. She presented with complaints of severe abdominal pain and fever at home. Pelvic US concerning for retained products of conception. She was started on Unasyn and taken to the OR for a D&C under ultrasound guidance. She tolerated procedure without event. Antibiotic therapy continued for a total of 24h. She remained afebrile and had stable vital signs. She was discharged on HD#2.        DISCHARGE PROGRESS NOTE    Subjective: Feeling Overall well. Cramping is minimal. Lochia is light. No fevers, chills, chest pain, or shortness of breath.    Vitals:    25 0400   BP: 109/67   Pulse: 85   Resp: 12   Temp: 36.4 °C (97.5 °F)   SpO2: 96%       GEN: NAD, speaking full sentences without distress  HEENT: NCAT, PERRLA, moist oral mucosa, anicteric, without pallor  CVS: Extremities warm and well perfused  LUNGS: Unlabored breathing  ABD: Soft, fundus firm, nontender, nondistended  BACK: No CVAT  EXT: No cyanosis or edema  NEURO: No focal deficits       Significant Labs/Imaging:   Component      Latest Ref Rng 6/10/2025   WBC      4.8 - 10.8 K/uL 9.5    WBC       12.2 (H)    RBC      4.20 - 5.40 M/uL 3.61 (L)    RBC       3.74 (L)    Hemoglobin      12.0 - 16.0 g/dL 9.4 (L)    Hemoglobin       9.8 (L)    Hematocrit      37.0 - 47.0 % 29.3 (L)    Hematocrit       30.6 (L)    MCV      81.4 - 97.8 fL 81.2 (L)    MCV       81.8    MCH      27.0 - 33.0 pg 26.0 (L)    MCH       26.2 (L)    MCHC      32.2 - 35.5 g/dL 32.1 (L)    MCHC       32.0 (L)    RDW       35.9 - 50.0 fL 46.5    RDW       46.9    Platelet Count      164 - 446 K/uL 429    Platelet Count       413    MPV      9.0 - 12.9 fL 9.6    MPV       9.3    Neutrophils-Polys      44.00 - 72.00 % 76.90 (H)    Lymphocytes      22.00 - 41.00 % 14.50 (L)    Monocytes      0.00 - 13.40 % 6.30    Eosinophils      0.00 - 6.90 % 1.00    Basophils      0.00 - 1.80 % 0.50    Immature Granulocytes      0.00 - 0.90 % 0.80    Nucleated RBC      0.00 - 0.20 /100 WBC 0.00    Neutrophils (Absolute)      1.82 - 7.42 K/uL 9.42 (H)    Lymphs (Absolute)      1.00 - 4.80 K/uL 1.77    Monos (Absolute)      0.00 - 0.85 K/uL 0.77    Eos (Absolute)      0.00 - 0.51 K/uL 0.12    Baso (Absolute)      0.00 - 0.12 K/uL 0.06    Immature Granulocytes (abs)      0.00 - 0.11 K/uL 0.10    NRBC (Absolute)      K/uL 0.00    Sodium      135 - 145 mmol/L 136    Potassium      3.6 - 5.5 mmol/L 4.0    Chloride      96 - 112 mmol/L 102    Co2      20 - 33 mmol/L 21    Anion Gap      7.0 - 16.0  13.0    Glucose      65 - 99 mg/dL 97    Bun      8 - 22 mg/dL 12    Creatinine      0.50 - 1.40 mg/dL 0.64    Calcium      8.5 - 10.5 mg/dL 8.8    Correct Calcium      8.5 - 10.5 mg/dL 9.0    AST(SGOT)      12 - 45 U/L 22    ALT(SGPT)      2 - 50 U/L 19    Alkaline Phosphatase      30 - 99 U/L 170 (H)    Total Bilirubin      0.1 - 1.5 mg/dL 0.3    Albumin      3.2 - 4.9 g/dL 3.8    Total Protein      6.0 - 8.2 g/dL 7.3    Globulin      1.9 - 3.5 g/dL 3.5    A-G Ratio      g/dL 1.1       Legend:  (H) High  (L) Low    US-PELVIC TRANSABDOMINAL ONLY  Order: [id number]   Status: Final result       Next appt: None    Test Result Released: Yes (seen)    0 Result Notes  Details    Reading Physician Reading Date Result Priority   Sylvester Peña M.D.  [phone] 6/10/2025      Narrative & Impression    6/10/2025 12:27 AM     HISTORY/REASON FOR EXAM:  Abdominal tenderness, concerning for retained products of conception     TECHNIQUE/EXAM DESCRIPTION:     Transabdominal  pelvic ultrasound.     COMPARISON:   None     FINDINGS:     Transabdominal scanning was performed.     UTERUS:     The uterus measures 6.23 cm x 13.61 cm x 9.87 cm.  The uterine myometrium is within normal limits.  The endometrial echo complex measures 4.58 cm.  The endometrium demonstrate increased Doppler flow. Hypoechoic material in the endometrial canal is seen.     OVARIES:     The right ovary measures 3.73 cm x 1.90 cm x 2.66 cm. Duplex Doppler examination of the right ovary shows normal waveforms.     The left ovary measures 2.41 cm x 1.62 cm x 2.58 cm. Duplex Doppler examination of the left ovary shows normal waveforms.     There is no free fluid seen.     IMPRESSION:        1.  Thickened endometrial stripe with increased Doppler flow, appearance concerning for retained products of conception.  2.  Hypoechoic material in the endometrial canal, appearance suggesting hemorrhagic products.     Assessment: Hannah is a 29yo  who is PPD#8 s/p vaginal homebirth, and POD#1 s/p D&C, who was readmitted with retained POCs and endometritis.    #Postpartum Endometritis  - Afebrile, VSS, nontoxic appearing  - Now s/p D&C under US guidance for retained POCs  - s/p Unasyn x24h and afebrile >24h  - Okay for DC home    Discharge Instructions:   - Recommend follow up in 1 week  - Return with worsening pain, fevers, chills, chest pain, shortness of breath, recurrence of foul smelling lochia      Hanane Wang MD MPH

## 2025-06-11 NOTE — PROGRESS NOTES
0500 report received from Daysi BIRD. Care assumed.     0700 report given to Gayle BIRD, care relinquished

## 2025-07-28 ENCOUNTER — HOSPITAL ENCOUNTER (OUTPATIENT)
Facility: MEDICAL CENTER | Age: 30
End: 2025-07-28
Attending: OBSTETRICS & GYNECOLOGY
Payer: COMMERCIAL

## 2025-07-28 PROCEDURE — 88142 CYTOPATH C/V THIN LAYER: CPT

## 2025-08-10 LAB
HPV I/H RISK 1 DNA SPEC QL NAA+PROBE: DETECTED
HPV16 DNA CVX QL PROBE+SIG AMP: NOT DETECTED
HPV18 DNA CVX QL PROBE+SIG AMP: NOT DETECTED
SPECIMEN SOURCE: ABNORMAL
SPECIMEN SOURCE: NORMAL
THINPREP PAP, CYTOLOGY NL11781: NORMAL

## (undated) DEVICE — TRAY FOLEY CATHETER STATLOCK 16FR SURESTEP (10EA/CA)

## (undated) DEVICE — SLING ORTH UNV TIETX VLFM ARM

## (undated) DEVICE — SENSOR OXIMETER ADULT SPO2 RD SET (20EA/BX)

## (undated) DEVICE — BANDAGE ROLL STERILE BULKEE 4.5 IN X 4 YD (100EA/CA)

## (undated) DEVICE — ELECTRODE 850 FOAM ADHESIVE - HYDROGEL RADIOTRNSPRNT (50/PK)

## (undated) DEVICE — PACK MINOR BASIN - (2EA/CA)

## (undated) DEVICE — SET EXTENSION WITH 2 PORTS (48EA/CA) ***PART #2C8610 IS A SUBSTITUTE*****

## (undated) DEVICE — DRAPE LARGE 3 QUARTER - (20/CA)

## (undated) DEVICE — GLOVE BIOGEL SZ 6.5 SURGICAL PF LTX (50PR/BX 4BX/CA)

## (undated) DEVICE — LEGGING LITHOTOMY 31 X 48 IN - (2EA/PK 20PK/CA)

## (undated) DEVICE — SLEEVE VASO CALF MED - (10PR/CA)

## (undated) DEVICE — KIT ANESTHESIA W/CIRCUIT & 3/LT BAG W/FILTER (20EA/CA)

## (undated) DEVICE — SOLUTION 10%PVP-IODINE 8OZ - (24/CA)

## (undated) DEVICE — TUBE SUCTION YANKAUER 1/4 X 6FT (50EA/CA)"

## (undated) DEVICE — TOWEL STOP TIMEOUT SAFETY FLAG (40EA/CA)

## (undated) DEVICE — SPONGE XRAY 8X4 STERL. 12PL - (10EA/TY 80TY/CA)

## (undated) DEVICE — NEEDLE NON SAFETY 25 GA X 1 1/2 IN HYPO (100EA/BX)

## (undated) DEVICE — GLOVE BIOGEL SZ 7 SURGICAL PF LTX - (50PR/BX 4BX/CA)

## (undated) DEVICE — SUTURE GENERAL

## (undated) DEVICE — DRESSING TRANSPARENT FILM TEGADERM 4 X 4.75" (50EA/BX)"

## (undated) DEVICE — SHEET TRANSVERSE LAP - (12EA/CA)

## (undated) DEVICE — LACTATED RINGERS INJ 1000 ML - (14EA/CA 60CA/PF)

## (undated) DEVICE — GLOVE BIOGEL INDICATOR SZ 7.5 SURGICAL PF LTX - (50PR/BX 4BX/CA)

## (undated) DEVICE — COVER CIV-FLEX TRANSDUCER - (24/BX)

## (undated) DEVICE — CANISTER SUCTION 3000ML MECHANICAL FILTER AUTO SHUTOFF MEDI-VAC NONSTERILE LF DISP  (40EA/CA)

## (undated) DEVICE — SUCTION INSTRUMENT YANKAUER BULBOUS TIP W/O VENT (50EA/CA)

## (undated) DEVICE — DRAPE SURGICAL U 77X120 - (10/CA)

## (undated) DEVICE — PACK ROOM TURNOVER L&D (12/CA)

## (undated) DEVICE — DRAPE IOBAN II INCISE 23X17 - (10EA/BX 4BX/CA)

## (undated) DEVICE — DRESSING TRANSPARENT FILM TEGADERM 2.375 X 2.75"  (100EA/BX)"

## (undated) DEVICE — BAG SPONGE COUNT 10.25 X 32 - BLUE (250/CA)

## (undated) DEVICE — SET LEADWIRE 5 LEAD BEDSIDE DISPOSABLE ECG (1SET OF 5/EA)

## (undated) DEVICE — ELECTRODE RADIOLUCENT LF 3PK - RED DOT (3/PK 200PK/CA)

## (undated) DEVICE — SUTURE 3-0 VICRYL PLUS SH - 8X 18 INCH (12/BX)

## (undated) DEVICE — SLEEVE, VASO, THIGH, MED

## (undated) DEVICE — CANISTER SUCTION 3000ML MECHANICAL FILTER AUTO SHUTOFF MEDI-VAC NONSTERILE LF DISP (40EA/CA)

## (undated) DEVICE — WATER IRRIGATION STERILE 1000ML (12EA/CA)

## (undated) DEVICE — TUBING CLEARLINK DUO-VENT - C-FLO (48EA/CA)

## (undated) DEVICE — SODIUM CHL IRRIGATION 0.9% 1000ML (12EA/CA)

## (undated) DEVICE — ELECTRODE DUAL RETURN W/ CORD - (50/PK)

## (undated) DEVICE — MASK AIRWAY SIZE 3 UNIQUE SILICON (10/BX)

## (undated) DEVICE — LIGASURE SM JAW SEALER CRVD - (6EA/CA)

## (undated) DEVICE — STOCKINETTE, TUBULAR 4

## (undated) DEVICE — MANIFOLD NEPTUNE 1 PORT (20/PK)

## (undated) DEVICE — TUBE CONNECTING SUCTION - CLEAR PLASTIC STERILE 72 IN (50EA/CA)

## (undated) DEVICE — WRAP CO-FLEX 4IN X 5YD STERIL - SELF-ADHERENT (18/CA)

## (undated) DEVICE — TOWEL, DRAPE, POLYLINED

## (undated) DEVICE — GOWN SURGEONS LARGE - (32/CA)

## (undated) DEVICE — GLOVE BIOGEL INDICATOR SZ 6.5 SURGICAL PF LTX - (50PR/BX 4BX/CA)

## (undated) DEVICE — KIT  I.V. START (100EA/CA)

## (undated) DEVICE — SUTURE 4-0 MONOCRYL PLUS PS-2 - 27 INCH (36/BX)

## (undated) DEVICE — CATHETER IV NON-SAFETY 18 GA X 1 1/4 (50/BX 4BX/CA)

## (undated) DEVICE — SUTURE 3-0 ETHILON FS-1 - (36/BX) 30 INCH

## (undated) DEVICE — STOCKINET TUBULAR 4IN STERILE - 4 X 36YDS (25/CA)

## (undated) DEVICE — GOWN WARMING STANDARD FLEX - (30/CA)

## (undated) DEVICE — PACK DELIVERY ROOM (7EA/CA)

## (undated) DEVICE — MASK ANESTHESIA ADULT  - (100/CA)

## (undated) DEVICE — CANNULA O2 COMFORT SOFT EAR ADULT 7 FT TUBING (50/CA)

## (undated) DEVICE — CATHETER IV 20 GA X 1-1/4 ---SURG.& SDS ONLY--- (50EA/BX)

## (undated) DEVICE — CLOSURE SKIN STRIP 1/2 X 4 IN - (STERI STRIP) (50/BX 4BX/CA)

## (undated) DEVICE — GLOVE BIOGEL ECLIPSE PF LATEX SIZE 6.0 (50PR/BX)

## (undated) DEVICE — TUBE CONNECT SUCTION CLEAR 120 X 1/4" (50EA/CA)"

## (undated) DEVICE — SUTURE 4-0 VICRYL PLUS FS-2 - 27 INCH (36/BX)

## (undated) DEVICE — CHLORAPREP 26 ML APPLICATOR - ORANGE TINT(25/CA)

## (undated) DEVICE — SPONGE GAUZESTER. 2X2 4-PL - (2/PK 50PK/BX 30BX/CS)

## (undated) DEVICE — SOLUTION PLASMA-LYTE PH 7.4 INJ 1000ML (14EA/CA)

## (undated) DEVICE — BLADE SURGICAL #15 - (50/BX 3BX/CA)

## (undated) DEVICE — BOVIE BLADE COATED &INSULATED - 25/PK

## (undated) DEVICE — CANISTER SUCTION RIGID RED 1500CC (40EA/CA)

## (undated) DEVICE — COVER LIGHT HANDLE ALC PLUS DISP (18EA/BX)

## (undated) DEVICE — SYRINGE 10 ML CONTROL LL (25EA/BX 4BX/CA)

## (undated) DEVICE — DRAPE LAPAROTOMY T SHEET - (12EA/CA)

## (undated) DEVICE — HEAD HOLDER JUNIOR/ADULT

## (undated) DEVICE — TRAY SRGPRP PVP IOD WT PRP - (20/CA)

## (undated) DEVICE — SPONGE GAUZESTER 4 X 4 4PLY - (128PK/CA)